# Patient Record
Sex: FEMALE | Race: WHITE | NOT HISPANIC OR LATINO | Employment: UNEMPLOYED
[De-identification: names, ages, dates, MRNs, and addresses within clinical notes are randomized per-mention and may not be internally consistent; named-entity substitution may affect disease eponyms.]

---

## 2017-08-12 ENCOUNTER — HEALTH MAINTENANCE LETTER (OUTPATIENT)
Age: 52
End: 2017-08-12

## 2019-11-06 ENCOUNTER — HEALTH MAINTENANCE LETTER (OUTPATIENT)
Age: 54
End: 2019-11-06

## 2020-02-16 ENCOUNTER — HEALTH MAINTENANCE LETTER (OUTPATIENT)
Age: 55
End: 2020-02-16

## 2020-11-29 ENCOUNTER — HEALTH MAINTENANCE LETTER (OUTPATIENT)
Age: 55
End: 2020-11-29

## 2021-04-10 ENCOUNTER — HEALTH MAINTENANCE LETTER (OUTPATIENT)
Age: 56
End: 2021-04-10

## 2021-09-19 ENCOUNTER — HEALTH MAINTENANCE LETTER (OUTPATIENT)
Age: 56
End: 2021-09-19

## 2021-11-14 ENCOUNTER — HEALTH MAINTENANCE LETTER (OUTPATIENT)
Age: 56
End: 2021-11-14

## 2022-05-01 ENCOUNTER — HEALTH MAINTENANCE LETTER (OUTPATIENT)
Age: 57
End: 2022-05-01

## 2022-11-21 ENCOUNTER — HEALTH MAINTENANCE LETTER (OUTPATIENT)
Age: 57
End: 2022-11-21

## 2023-05-22 ENCOUNTER — APPOINTMENT (OUTPATIENT)
Dept: GENERAL RADIOLOGY | Facility: CLINIC | Age: 58
End: 2023-05-22
Attending: STUDENT IN AN ORGANIZED HEALTH CARE EDUCATION/TRAINING PROGRAM

## 2023-05-22 ENCOUNTER — HOSPITAL ENCOUNTER (EMERGENCY)
Facility: CLINIC | Age: 58
Discharge: HOME | End: 2023-05-22
Attending: STUDENT IN AN ORGANIZED HEALTH CARE EDUCATION/TRAINING PROGRAM | Admitting: STUDENT IN AN ORGANIZED HEALTH CARE EDUCATION/TRAINING PROGRAM

## 2023-05-22 ENCOUNTER — APPOINTMENT (OUTPATIENT)
Dept: CT IMAGING | Facility: CLINIC | Age: 58
End: 2023-05-22
Attending: STUDENT IN AN ORGANIZED HEALTH CARE EDUCATION/TRAINING PROGRAM

## 2023-05-22 VITALS
SYSTOLIC BLOOD PRESSURE: 180 MMHG | HEART RATE: 77 BPM | OXYGEN SATURATION: 100 % | WEIGHT: 220 LBS | HEIGHT: 65 IN | RESPIRATION RATE: 20 BRPM | DIASTOLIC BLOOD PRESSURE: 114 MMHG | TEMPERATURE: 97.7 F | BODY MASS INDEX: 36.65 KG/M2

## 2023-05-22 DIAGNOSIS — M25.561 ACUTE PAIN OF RIGHT KNEE: ICD-10-CM

## 2023-05-22 DIAGNOSIS — S09.90XA CLOSED HEAD INJURY, INITIAL ENCOUNTER: ICD-10-CM
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
## 2023-05-22 PROCEDURE — 99284 EMERGENCY DEPT VISIT MOD MDM: CPT | Mod: 25

## 2023-05-22 PROCEDURE — G1010 CDSM STANSON: HCPCS

## 2023-05-22 PROCEDURE — 250N000013 HC RX MED GY IP 250 OP 250 PS 637: Performed by: STUDENT IN AN ORGANIZED HEALTH CARE EDUCATION/TRAINING PROGRAM

## 2023-05-22 PROCEDURE — 73562 X-RAY EXAM OF KNEE 3: CPT | Mod: RT

## 2023-05-22 RX ORDER — IBUPROFEN 600 MG/1
600 TABLET, FILM COATED ORAL ONCE
Status: COMPLETED | OUTPATIENT
Start: 2023-05-22 | End: 2023-05-22

## 2023-05-22 RX ADMIN — IBUPROFEN 600 MG: 600 TABLET ORAL at 12:47

## 2023-05-22 ASSESSMENT — ACTIVITIES OF DAILY LIVING (ADL): ADLS_ACUITY_SCORE: 35
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
## 2023-06-02 ENCOUNTER — HEALTH MAINTENANCE LETTER (OUTPATIENT)
Age: 58
End: 2023-06-02

## 2023-07-30 ENCOUNTER — APPOINTMENT (OUTPATIENT)
Dept: GENERAL RADIOLOGY | Facility: CLINIC | Age: 58
End: 2023-07-30
Attending: EMERGENCY MEDICINE
Payer: COMMERCIAL

## 2023-07-30 ENCOUNTER — HOSPITAL ENCOUNTER (EMERGENCY)
Facility: CLINIC | Age: 58
Discharge: HOME | End: 2023-07-30
Attending: EMERGENCY MEDICINE | Admitting: EMERGENCY MEDICINE
Payer: COMMERCIAL

## 2023-07-30 ENCOUNTER — APPOINTMENT (OUTPATIENT)
Dept: CT IMAGING | Facility: CLINIC | Age: 58
End: 2023-07-30
Attending: EMERGENCY MEDICINE
Payer: COMMERCIAL

## 2023-07-30 VITALS
SYSTOLIC BLOOD PRESSURE: 175 MMHG | DIASTOLIC BLOOD PRESSURE: 117 MMHG | HEART RATE: 59 BPM | RESPIRATION RATE: 18 BRPM | OXYGEN SATURATION: 100 % | TEMPERATURE: 98 F

## 2023-07-30 DIAGNOSIS — M16.10 HIP ARTHRITIS: ICD-10-CM

## 2023-07-30 DIAGNOSIS — M25.452 HIP JOINT EFFUSION, LEFT: ICD-10-CM

## 2023-07-30 PROCEDURE — 99284 EMERGENCY DEPT VISIT MOD MDM: CPT | Mod: 25

## 2023-07-30 PROCEDURE — 73502 X-RAY EXAM HIP UNI 2-3 VIEWS: CPT

## 2023-07-30 PROCEDURE — 72192 CT PELVIS W/O DYE: CPT

## 2023-07-30 RX ORDER — PREDNISONE 20 MG/1
TABLET ORAL
Qty: 10 TABLET | Refills: 0 | Status: SHIPPED | OUTPATIENT
Start: 2023-07-30

## 2023-07-30 RX ORDER — OXYCODONE HYDROCHLORIDE 5 MG/1
5 TABLET ORAL EVERY 6 HOURS PRN
Qty: 12 TABLET | Refills: 0 | Status: SHIPPED | OUTPATIENT
Start: 2023-07-30

## 2023-07-30 ASSESSMENT — ACTIVITIES OF DAILY LIVING (ADL): ADLS_ACUITY_SCORE: 35
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
## 2023-07-30 NOTE — DISCHARGE INSTRUCTIONS
Take the prednisone and oxycodone as needed for pain.  Follow-up with orthopedic surgery for consultation.

## 2023-10-08 ENCOUNTER — HOSPITAL ENCOUNTER (EMERGENCY)
Facility: CLINIC | Age: 58
Discharge: HOME | End: 2023-10-08
Attending: EMERGENCY MEDICINE | Admitting: EMERGENCY MEDICINE
Payer: COMMERCIAL

## 2023-10-08 VITALS
RESPIRATION RATE: 22 BRPM | BODY MASS INDEX: 37.56 KG/M2 | HEIGHT: 64 IN | TEMPERATURE: 98.3 F | WEIGHT: 220 LBS | HEART RATE: 91 BPM | OXYGEN SATURATION: 97 % | SYSTOLIC BLOOD PRESSURE: 138 MMHG | DIASTOLIC BLOOD PRESSURE: 78 MMHG

## 2023-10-08 DIAGNOSIS — L03.213 PERIORBITAL CELLULITIS OF LEFT EYE: ICD-10-CM

## 2023-10-08 DIAGNOSIS — H10.32 ACUTE BACTERIAL CONJUNCTIVITIS OF LEFT EYE: ICD-10-CM

## 2023-10-08 PROCEDURE — 99284 EMERGENCY DEPT VISIT MOD MDM: CPT

## 2023-10-08 RX ORDER — POLYMYXIN B SULFATE AND TRIMETHOPRIM 1; 10000 MG/ML; [USP'U]/ML
1-2 SOLUTION OPHTHALMIC EVERY 4 HOURS
Qty: 10 ML | Refills: 0 | Status: SHIPPED | OUTPATIENT
Start: 2023-10-08 | End: 2023-10-18

## 2023-10-08 RX ORDER — CLINDAMYCIN HCL 300 MG
300 CAPSULE ORAL 3 TIMES DAILY
Qty: 30 CAPSULE | Refills: 0 | Status: SHIPPED | OUTPATIENT
Start: 2023-10-08 | End: 2023-10-18

## 2023-10-08 ASSESSMENT — VISUAL ACUITY
OS: 20/60
OD: 20/20

## 2023-10-08 NOTE — ED TRIAGE NOTES
Left eye swollen, woke up in the middle of the night with it. Pt states puss has been coming out. States no injury to eye
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
## 2023-11-26 ENCOUNTER — HEALTH MAINTENANCE LETTER (OUTPATIENT)
Age: 58
End: 2023-11-26

## 2024-02-01 ENCOUNTER — OFFICE VISIT (OUTPATIENT)
Dept: URGENT CARE | Facility: URGENT CARE | Age: 59
End: 2024-02-01
Payer: COMMERCIAL

## 2024-02-01 VITALS
OXYGEN SATURATION: 99 % | TEMPERATURE: 97.8 F | HEART RATE: 68 BPM | DIASTOLIC BLOOD PRESSURE: 87 MMHG | SYSTOLIC BLOOD PRESSURE: 135 MMHG | BODY MASS INDEX: 39.48 KG/M2 | WEIGHT: 230 LBS

## 2024-02-01 DIAGNOSIS — B96.89 BACTERIAL VAGINOSIS: ICD-10-CM

## 2024-02-01 DIAGNOSIS — H10.11 ALLERGIC CONJUNCTIVITIS, RIGHT: ICD-10-CM

## 2024-02-01 DIAGNOSIS — N76.0 BACTERIAL VAGINOSIS: ICD-10-CM

## 2024-02-01 DIAGNOSIS — N39.0 ACUTE UTI: Primary | ICD-10-CM

## 2024-02-01 DIAGNOSIS — R10.13 ABDOMINAL PAIN, EPIGASTRIC: ICD-10-CM

## 2024-02-01 LAB
ALBUMIN UR-MCNC: NEGATIVE MG/DL
APPEARANCE UR: CLEAR
BACTERIA #/AREA URNS HPF: ABNORMAL /HPF
BILIRUB UR QL STRIP: NEGATIVE
CLUE CELLS: PRESENT
COLOR UR AUTO: YELLOW
GLUCOSE UR STRIP-MCNC: NEGATIVE MG/DL
HGB UR QL STRIP: ABNORMAL
KETONES UR STRIP-MCNC: NEGATIVE MG/DL
LEUKOCYTE ESTERASE UR QL STRIP: NEGATIVE
NITRATE UR QL: POSITIVE
PH UR STRIP: 5.5 [PH] (ref 5–7)
RBC #/AREA URNS AUTO: ABNORMAL /HPF
SP GR UR STRIP: >=1.03 (ref 1–1.03)
SQUAMOUS #/AREA URNS AUTO: ABNORMAL /LPF
TRICHOMONAS, WET PREP: ABNORMAL
UROBILINOGEN UR STRIP-ACNC: 0.2 E.U./DL
WBC #/AREA URNS AUTO: ABNORMAL /HPF
WBC'S/HIGH POWER FIELD, WET PREP: ABNORMAL
YEAST, WET PREP: ABNORMAL

## 2024-02-01 PROCEDURE — 81001 URINALYSIS AUTO W/SCOPE: CPT | Performed by: EMERGENCY MEDICINE

## 2024-02-01 PROCEDURE — 99204 OFFICE O/P NEW MOD 45 MIN: CPT | Performed by: EMERGENCY MEDICINE

## 2024-02-01 PROCEDURE — 87210 SMEAR WET MOUNT SALINE/INK: CPT | Performed by: NURSE PRACTITIONER

## 2024-02-01 PROCEDURE — 87086 URINE CULTURE/COLONY COUNT: CPT | Performed by: EMERGENCY MEDICINE

## 2024-02-01 RX ORDER — AMLODIPINE BESYLATE 10 MG/1
10 TABLET ORAL DAILY
COMMUNITY

## 2024-02-01 RX ORDER — METRONIDAZOLE 500 MG/1
500 TABLET ORAL 2 TIMES DAILY
Qty: 14 TABLET | Refills: 0 | Status: SHIPPED | OUTPATIENT
Start: 2024-02-01 | End: 2024-02-08

## 2024-02-01 RX ORDER — NITROFURANTOIN 25; 75 MG/1; MG/1
100 CAPSULE ORAL 2 TIMES DAILY
Qty: 10 CAPSULE | Refills: 0 | Status: SHIPPED | OUTPATIENT
Start: 2024-02-01 | End: 2024-02-06

## 2024-02-01 RX ORDER — OLOPATADINE HYDROCHLORIDE 1 MG/ML
1 SOLUTION/ DROPS OPHTHALMIC 2 TIMES DAILY
Qty: 5 ML | Refills: 0 | Status: SHIPPED | OUTPATIENT
Start: 2024-02-01

## 2024-02-01 NOTE — PROGRESS NOTES
Assessment & Plan     Diagnosis:    ICD-10-CM    1. Acute UTI  N39.0 UA Macroscopic with reflex to Microscopic and Culture - Clinic Collect     Wet prep - Clinic Collect     Urine Microscopic Exam     Urine Culture     nitroFURantoin macrocrystal-monohydrate (MACROBID) 100 MG capsule      2. Bacterial vaginosis  N76.0 metroNIDAZOLE (FLAGYL) 500 MG tablet    B96.89       3. Allergic conjunctivitis, right  H10.11 olopatadine (PATANOL) 0.1 % ophthalmic solution      4. Abdominal pain, epigastric  R10.13 lidocaine (viscous) (XYLOCAINE) 2 % 15 mL, alum & mag hydroxide-simethicone (MAALOX) 15 mL GI Cocktail          Medical Decision Making:  Omayra Malone is a 58 year old female who presents to clinic today for evaluation of urinary frequency, urgency and dysuria.     This clinically is consistent with a urinary tract infection.  Urinalysis confirms the infection.  The patient is not pregnant.No concern for pregnancy; patient declines testing. There has been no fever, confusion, flank pain or significant abdominal pain.  The patient is not concerned about STDs and testing not indicated. There is no clinical evidence of pyelonephritis, appendicitis, colitis, diverticulitis or any intraabdominal catastrophe.  She does have epigastric abdominal pain as well, I feel this is unrelated, likely gastritis.  She did have improvement with GI cocktail.  The patient will be started on antibiotics for BV and the urinary tract infection.     Moreover, she has signs and symptoms consistent with allergic conjunctivitis, itchy watery eye, there is been no mattering or signs of bacterial conjunctivitis.  Will start her on Pataday eyedrops.  Go to the ER if increasing pain, vomiting, fever, or inability to tolerate the oral antibiotic.  Follow up with primary physician is indicated if not improving in 2-3 days.     Fly Guo PA-C  [facility]    Subjective     Omayra Malone is a 58 year old female who presents with   to clinic today for the following health issues:  Chief Complaint   Patient presents with    Abdominal Pain     For a few day and cloudy urine     Eye Problem     Right eye itchy and watery yesterday          HPI  Patient states that for the past few days they experienced a burning sensation, and frequency and urgency of urination. This has gotten worse over time. They note that they have mild epigastric abdominal pain as well. Patient denies any flank or back pain, fever, nausea (did have one episode of vomiting yesterday). No diarrhea, inability to urinate, vaginal discharge/bleeding or concerns for STDS. She reports she just got out of skilled nursing.    Moreover, patient notes she has had itchy watery eye for the last 24 hours, no crust, mattering, yellow goop or anything by clear drainage.  Has had a runny stuffy nose as well.  No other URI symptoms.  No vision changes, foreign body sensation or other concerns.    Review of Systems    See HPI    Objective      Vitals:    Patient Vitals for the past 24 hrs:   BP Temp Temp src Pulse SpO2 Weight   02/01/24 1300 135/87 97.8  F (36.6  C) Tympanic 68 99 % 104.3 kg (230 lb)         Vital signs reviewed by: Fly Guo PA-C    Physical Exam   Constitutional: The patient is oriented to person, place, and time. Alert and cooperative. No acute distress.  Mouth: Mucous membranes are moist.  Cardiovascular: Regular rate and rhythm.  Pulmonary/Chest: Effort normal. No respiratory distress.   GI: Abdomen with epigastric tenderness to palpation. Negative Wilkes's sign. No rebound or guarding. No McBurney point tenderness.   MSK: No CVA tenderness bilaterally.  Neurological: Alert and oriented x3.     Labs/Imaging:    Results for orders placed or performed in visit on 02/01/24   UA Macroscopic with reflex to Microscopic and Culture - Clinic Collect     Status: Abnormal    Specimen: Urine, Clean Catch   Result Value Ref Range    Color Urine Yellow Colorless, Straw, Light Yellow,  Yellow    Appearance Urine Clear Clear    Glucose Urine Negative Negative mg/dL    Bilirubin Urine Negative Negative    Ketones Urine Negative Negative mg/dL    Specific Gravity Urine >=1.030 1.003 - 1.035    Blood Urine Small (A) Negative    pH Urine 5.5 5.0 - 7.0    Protein Albumin Urine Negative Negative mg/dL    Urobilinogen Urine 0.2 0.2, 1.0 E.U./dL    Nitrite Urine Positive (A) Negative    Leukocyte Esterase Urine Negative Negative   Urine Microscopic Exam     Status: Abnormal   Result Value Ref Range    Bacteria Urine Many (A) None Seen /HPF    RBC Urine 0-2 0-2 /HPF /HPF    WBC Urine 5-10 (A) 0-5 /HPF /HPF    Squamous Epithelials Urine Few (A) None Seen /LPF   Wet prep - Clinic Collect     Status: Abnormal    Specimen: Vagina; Swab   Result Value Ref Range    Trichomonas Absent Absent    Yeast Absent Absent    Clue Cells Present (A) Absent    WBCs/high power field 2+ (A) None       Interventions:  GI Cocktail (Maalox/Mylanta and viscous Lidocaine), 30 mL suspension, PO       Fly Guo PA-C, February 1, 2024

## 2024-02-03 LAB — BACTERIA UR CULT: NORMAL

## 2024-05-15 ENCOUNTER — APPOINTMENT (OUTPATIENT)
Dept: GENERAL RADIOLOGY | Facility: CLINIC | Age: 59
End: 2024-05-15
Attending: EMERGENCY MEDICINE
Payer: COMMERCIAL

## 2024-05-15 ENCOUNTER — HOSPITAL ENCOUNTER (EMERGENCY)
Facility: CLINIC | Age: 59
Discharge: LEFT BEFORE DISCHARGE | End: 2024-05-15
Admitting: EMERGENCY MEDICINE
Payer: COMMERCIAL

## 2024-05-15 VITALS
TEMPERATURE: 97.2 F | DIASTOLIC BLOOD PRESSURE: 82 MMHG | OXYGEN SATURATION: 94 % | SYSTOLIC BLOOD PRESSURE: 160 MMHG | RESPIRATION RATE: 22 BRPM | HEART RATE: 73 BPM

## 2024-05-15 LAB
ALBUMIN SERPL BCG-MCNC: 4 G/DL (ref 3.5–5.2)
ALP SERPL-CCNC: 98 U/L (ref 40–150)
ALT SERPL W P-5'-P-CCNC: 28 U/L (ref 0–50)
ANION GAP SERPL CALCULATED.3IONS-SCNC: 13 MMOL/L (ref 7–15)
AST SERPL W P-5'-P-CCNC: 18 U/L (ref 0–45)
ATRIAL RATE - MUSE: 64 BPM
BASOPHILS # BLD AUTO: 0 10E3/UL (ref 0–0.2)
BASOPHILS NFR BLD AUTO: 0 %
BILIRUB SERPL-MCNC: 0.2 MG/DL
BUN SERPL-MCNC: 15.5 MG/DL (ref 8–23)
CALCIUM SERPL-MCNC: 9.3 MG/DL (ref 8.8–10.2)
CHLORIDE SERPL-SCNC: 108 MMOL/L (ref 98–107)
CREAT SERPL-MCNC: 0.57 MG/DL (ref 0.51–0.95)
DEPRECATED HCO3 PLAS-SCNC: 26 MMOL/L (ref 22–29)
DIASTOLIC BLOOD PRESSURE - MUSE: NORMAL MMHG
EGFRCR SERPLBLD CKD-EPI 2021: >90 ML/MIN/1.73M2
EOSINOPHIL # BLD AUTO: 0.1 10E3/UL (ref 0–0.7)
EOSINOPHIL NFR BLD AUTO: 2 %
ERYTHROCYTE [DISTWIDTH] IN BLOOD BY AUTOMATED COUNT: 14.6 % (ref 10–15)
FLUAV RNA SPEC QL NAA+PROBE: NEGATIVE
FLUBV RNA RESP QL NAA+PROBE: NEGATIVE
GLUCOSE SERPL-MCNC: 100 MG/DL (ref 70–99)
HCT VFR BLD AUTO: 38.1 % (ref 35–47)
HGB BLD-MCNC: 11.8 G/DL (ref 11.7–15.7)
IMM GRANULOCYTES # BLD: 0 10E3/UL
IMM GRANULOCYTES NFR BLD: 0 %
INTERPRETATION ECG - MUSE: NORMAL
LYMPHOCYTES # BLD AUTO: 2.4 10E3/UL (ref 0.8–5.3)
LYMPHOCYTES NFR BLD AUTO: 35 %
MCH RBC QN AUTO: 27.6 PG (ref 26.5–33)
MCHC RBC AUTO-ENTMCNC: 31 G/DL (ref 31.5–36.5)
MCV RBC AUTO: 89 FL (ref 78–100)
MONOCYTES # BLD AUTO: 0.5 10E3/UL (ref 0–1.3)
MONOCYTES NFR BLD AUTO: 7 %
NEUTROPHILS # BLD AUTO: 3.7 10E3/UL (ref 1.6–8.3)
NEUTROPHILS NFR BLD AUTO: 56 %
NRBC # BLD AUTO: 0 10E3/UL
NRBC BLD AUTO-RTO: 0 /100
P AXIS - MUSE: 61 DEGREES
PLATELET # BLD AUTO: 234 10E3/UL (ref 150–450)
POTASSIUM SERPL-SCNC: 3.4 MMOL/L (ref 3.4–5.3)
PR INTERVAL - MUSE: 162 MS
PROT SERPL-MCNC: 7.2 G/DL (ref 6.4–8.3)
QRS DURATION - MUSE: 102 MS
QT - MUSE: 426 MS
QTC - MUSE: 439 MS
R AXIS - MUSE: -60 DEGREES
RBC # BLD AUTO: 4.27 10E6/UL (ref 3.8–5.2)
RSV RNA SPEC NAA+PROBE: NEGATIVE
SARS-COV-2 RNA RESP QL NAA+PROBE: NEGATIVE
SODIUM SERPL-SCNC: 147 MMOL/L (ref 135–145)
SYSTOLIC BLOOD PRESSURE - MUSE: NORMAL MMHG
T AXIS - MUSE: 51 DEGREES
TROPONIN T SERPL HS-MCNC: 7 NG/L
VENTRICULAR RATE- MUSE: 64 BPM
WBC # BLD AUTO: 6.7 10E3/UL (ref 4–11)

## 2024-05-15 PROCEDURE — 99281 EMR DPT VST MAYX REQ PHY/QHP: CPT

## 2024-05-15 PROCEDURE — 84484 ASSAY OF TROPONIN QUANT: CPT | Performed by: EMERGENCY MEDICINE

## 2024-05-15 PROCEDURE — 80053 COMPREHEN METABOLIC PANEL: CPT | Performed by: EMERGENCY MEDICINE

## 2024-05-15 PROCEDURE — 36415 COLL VENOUS BLD VENIPUNCTURE: CPT | Performed by: EMERGENCY MEDICINE

## 2024-05-15 PROCEDURE — 85004 AUTOMATED DIFF WBC COUNT: CPT | Performed by: EMERGENCY MEDICINE

## 2024-05-15 PROCEDURE — 87637 SARSCOV2&INF A&B&RSV AMP PRB: CPT | Performed by: EMERGENCY MEDICINE

## 2024-05-15 PROCEDURE — 93005 ELECTROCARDIOGRAM TRACING: CPT | Mod: RTG

## 2024-05-15 PROCEDURE — 71046 X-RAY EXAM CHEST 2 VIEWS: CPT

## 2024-05-15 ASSESSMENT — COLUMBIA-SUICIDE SEVERITY RATING SCALE - C-SSRS
6. HAVE YOU EVER DONE ANYTHING, STARTED TO DO ANYTHING, OR PREPARED TO DO ANYTHING TO END YOUR LIFE?: NO
1. IN THE PAST MONTH, HAVE YOU WISHED YOU WERE DEAD OR WISHED YOU COULD GO TO SLEEP AND NOT WAKE UP?: NO
2. HAVE YOU ACTUALLY HAD ANY THOUGHTS OF KILLING YOURSELF IN THE PAST MONTH?: NO

## 2024-05-15 ASSESSMENT — ACTIVITIES OF DAILY LIVING (ADL)
ADLS_ACUITY_SCORE: 35
ADLS_ACUITY_SCORE: 35

## 2024-05-16 NOTE — ED TRIAGE NOTES
Patient comes in with worsening cough for a week. State she is coughing up mucus. States slights chest pain from cough. Denies fevers. From .      Triage Assessment (Adult)       Row Name 05/15/24 0657          Triage Assessment    Airway WDL WDL        Respiratory WDL    Respiratory WDL --  cough and sob        Skin Circulation/Temperature WDL    Skin Circulation/Temperature WDL WDL        Cardiac WDL    Cardiac WDL --  chest pain        Peripheral/Neurovascular WDL    Peripheral Neurovascular WDL WDL        Cognitive/Neuro/Behavioral WDL    Cognitive/Neuro/Behavioral WDL WDL

## 2024-06-23 ENCOUNTER — HEALTH MAINTENANCE LETTER (OUTPATIENT)
Age: 59
End: 2024-06-23

## 2025-05-22 ENCOUNTER — MEDICAL CORRESPONDENCE (OUTPATIENT)
Dept: HEALTH INFORMATION MANAGEMENT | Facility: CLINIC | Age: 60
End: 2025-05-22

## 2025-05-22 ENCOUNTER — HOSPITAL ENCOUNTER (INPATIENT)
Facility: CLINIC | Age: 60
DRG: 291 | End: 2025-05-22
Attending: EMERGENCY MEDICINE
Payer: COMMERCIAL

## 2025-05-22 ENCOUNTER — APPOINTMENT (OUTPATIENT)
Dept: GENERAL RADIOLOGY | Facility: CLINIC | Age: 60
DRG: 291 | End: 2025-05-22
Attending: EMERGENCY MEDICINE
Payer: COMMERCIAL

## 2025-05-22 VITALS
DIASTOLIC BLOOD PRESSURE: 98 MMHG | HEIGHT: 64 IN | RESPIRATION RATE: 21 BRPM | SYSTOLIC BLOOD PRESSURE: 142 MMHG | OXYGEN SATURATION: 98 % | WEIGHT: 293 LBS | HEART RATE: 71 BPM | BODY MASS INDEX: 50.02 KG/M2 | TEMPERATURE: 98.6 F

## 2025-05-22 DIAGNOSIS — I48.92 ATRIAL FLUTTER WITH RAPID VENTRICULAR RESPONSE (H): ICD-10-CM

## 2025-05-22 LAB
ALBUMIN SERPL BCG-MCNC: 3.7 G/DL (ref 3.5–5.2)
ALP SERPL-CCNC: 124 U/L (ref 40–150)
ALT SERPL W P-5'-P-CCNC: 12 U/L (ref 0–50)
ANION GAP SERPL CALCULATED.3IONS-SCNC: 12 MMOL/L (ref 7–15)
AST SERPL W P-5'-P-CCNC: 15 U/L (ref 0–45)
ATRIAL RATE - MUSE: 366 BPM
ATRIAL RATE - MUSE: 81 BPM
BASOPHILS # BLD AUTO: 0 10E3/UL (ref 0–0.2)
BASOPHILS NFR BLD AUTO: 0 %
BILIRUB DIRECT SERPL-MCNC: 0.09 MG/DL (ref 0–0.3)
BILIRUB SERPL-MCNC: 0.2 MG/DL
BUN SERPL-MCNC: 16.5 MG/DL (ref 8–23)
CALCIUM SERPL-MCNC: 9.2 MG/DL (ref 8.8–10.4)
CHLORIDE SERPL-SCNC: 106 MMOL/L (ref 98–107)
CREAT SERPL-MCNC: 0.62 MG/DL (ref 0.51–0.95)
DIASTOLIC BLOOD PRESSURE - MUSE: NORMAL MMHG
DIASTOLIC BLOOD PRESSURE - MUSE: NORMAL MMHG
EGFRCR SERPLBLD CKD-EPI 2021: >90 ML/MIN/1.73M2
EOSINOPHIL # BLD AUTO: 0.1 10E3/UL (ref 0–0.7)
EOSINOPHIL NFR BLD AUTO: 1 %
ERYTHROCYTE [DISTWIDTH] IN BLOOD BY AUTOMATED COUNT: 21.2 % (ref 10–15)
GLUCOSE SERPL-MCNC: 110 MG/DL (ref 70–99)
HCO3 SERPL-SCNC: 23 MMOL/L (ref 22–29)
HCT VFR BLD AUTO: 26.5 % (ref 35–47)
HGB BLD-MCNC: 8 G/DL (ref 11.7–15.7)
HOLD SPECIMEN: NORMAL
IMM GRANULOCYTES # BLD: 0 10E3/UL
IMM GRANULOCYTES NFR BLD: 0 %
INTERPRETATION ECG - MUSE: NORMAL
INTERPRETATION ECG - MUSE: NORMAL
IRON BINDING CAPACITY (ROCHE): 416 UG/DL (ref 240–430)
IRON SATN MFR SERPL: 4 % (ref 15–46)
IRON SERPL-MCNC: 16 UG/DL (ref 37–145)
LYMPHOCYTES # BLD AUTO: 1.8 10E3/UL (ref 0.8–5.3)
LYMPHOCYTES NFR BLD AUTO: 18 %
MAGNESIUM SERPL-MCNC: 1.7 MG/DL (ref 1.7–2.3)
MCH RBC QN AUTO: 21.7 PG (ref 26.5–33)
MCHC RBC AUTO-ENTMCNC: 30.2 G/DL (ref 31.5–36.5)
MCV RBC AUTO: 72 FL (ref 78–100)
MONOCYTES # BLD AUTO: 0.8 10E3/UL (ref 0–1.3)
MONOCYTES NFR BLD AUTO: 8 %
NEUTROPHILS # BLD AUTO: 6.9 10E3/UL (ref 1.6–8.3)
NEUTROPHILS NFR BLD AUTO: 72 %
NRBC # BLD AUTO: 0 10E3/UL
NRBC BLD AUTO-RTO: 0 /100
NT-PROBNP SERPL-MCNC: 1648 PG/ML (ref 0–226)
P AXIS - MUSE: 23 DEGREES
P AXIS - MUSE: 46 DEGREES
PLATELET # BLD AUTO: 275 10E3/UL (ref 150–450)
POTASSIUM SERPL-SCNC: 3.9 MMOL/L (ref 3.4–5.3)
PR INTERVAL - MUSE: 160 MS
PR INTERVAL - MUSE: NORMAL MS
PROT SERPL-MCNC: 6.8 G/DL (ref 6.4–8.3)
QRS DURATION - MUSE: 84 MS
QRS DURATION - MUSE: 86 MS
QT - MUSE: 366 MS
QT - MUSE: 392 MS
QTC - MUSE: 455 MS
QTC - MUSE: 474 MS
R AXIS - MUSE: -29 DEGREES
R AXIS - MUSE: -43 DEGREES
RBC # BLD AUTO: 3.68 10E6/UL (ref 3.8–5.2)
SODIUM SERPL-SCNC: 141 MMOL/L (ref 135–145)
SYSTOLIC BLOOD PRESSURE - MUSE: NORMAL MMHG
SYSTOLIC BLOOD PRESSURE - MUSE: NORMAL MMHG
T AXIS - MUSE: 7 DEGREES
T AXIS - MUSE: 95 DEGREES
TROPONIN T SERPL HS-MCNC: 11 NG/L
TSH SERPL DL<=0.005 MIU/L-ACNC: 2.06 UIU/ML (ref 0.3–4.2)
VENTRICULAR RATE- MUSE: 101 BPM
VENTRICULAR RATE- MUSE: 81 BPM
WBC # BLD AUTO: 9.6 10E3/UL (ref 4–11)

## 2025-05-22 PROCEDURE — 210N000001 HC R&B IMCU HEART CARE

## 2025-05-22 PROCEDURE — 93005 ELECTROCARDIOGRAM TRACING: CPT | Mod: XU

## 2025-05-22 PROCEDURE — 83550 IRON BINDING TEST: CPT

## 2025-05-22 PROCEDURE — 71045 X-RAY EXAM CHEST 1 VIEW: CPT

## 2025-05-22 PROCEDURE — 999N000157 HC STATISTIC RCP TIME EA 10 MIN

## 2025-05-22 PROCEDURE — 83880 ASSAY OF NATRIURETIC PEPTIDE: CPT | Performed by: EMERGENCY MEDICINE

## 2025-05-22 PROCEDURE — 99223 1ST HOSP IP/OBS HIGH 75: CPT

## 2025-05-22 PROCEDURE — 80076 HEPATIC FUNCTION PANEL: CPT

## 2025-05-22 PROCEDURE — 85025 COMPLETE CBC W/AUTO DIFF WBC: CPT | Performed by: EMERGENCY MEDICINE

## 2025-05-22 PROCEDURE — 84443 ASSAY THYROID STIM HORMONE: CPT | Performed by: EMERGENCY MEDICINE

## 2025-05-22 PROCEDURE — 99291 CRITICAL CARE FIRST HOUR: CPT | Mod: 25

## 2025-05-22 PROCEDURE — 250N000009 HC RX 250: Performed by: EMERGENCY MEDICINE

## 2025-05-22 PROCEDURE — 84132 ASSAY OF SERUM POTASSIUM: CPT | Performed by: EMERGENCY MEDICINE

## 2025-05-22 PROCEDURE — 258N000003 HC RX IP 258 OP 636: Performed by: EMERGENCY MEDICINE

## 2025-05-22 PROCEDURE — 82728 ASSAY OF FERRITIN: CPT

## 2025-05-22 PROCEDURE — 96374 THER/PROPH/DIAG INJ IV PUSH: CPT | Mod: 59

## 2025-05-22 PROCEDURE — 250N000009 HC RX 250: Performed by: STUDENT IN AN ORGANIZED HEALTH CARE EDUCATION/TRAINING PROGRAM

## 2025-05-22 PROCEDURE — 250N000011 HC RX IP 250 OP 636: Performed by: EMERGENCY MEDICINE

## 2025-05-22 PROCEDURE — 93005 ELECTROCARDIOGRAM TRACING: CPT

## 2025-05-22 PROCEDURE — 36415 COLL VENOUS BLD VENIPUNCTURE: CPT | Performed by: EMERGENCY MEDICINE

## 2025-05-22 PROCEDURE — 83735 ASSAY OF MAGNESIUM: CPT

## 2025-05-22 PROCEDURE — 96365 THER/PROPH/DIAG IV INF INIT: CPT

## 2025-05-22 PROCEDURE — 82607 VITAMIN B-12: CPT

## 2025-05-22 PROCEDURE — 84484 ASSAY OF TROPONIN QUANT: CPT | Performed by: EMERGENCY MEDICINE

## 2025-05-22 RX ORDER — DILTIAZEM HYDROCHLORIDE 5 MG/ML
10 INJECTION INTRAVENOUS ONCE
Status: COMPLETED | OUTPATIENT
Start: 2025-05-22 | End: 2025-05-22

## 2025-05-22 RX ORDER — NYSTATIN 100000 [USP'U]/G
POWDER TOPICAL DAILY PRN
COMMUNITY

## 2025-05-22 RX ORDER — HYDROCHLOROTHIAZIDE 25 MG/1
25 TABLET ORAL DAILY
Status: ON HOLD | COMMUNITY
End: 2025-06-02

## 2025-05-22 RX ORDER — ALPRAZOLAM 2 MG/1
2 TABLET ORAL 2 TIMES DAILY PRN
COMMUNITY
End: 2025-05-22

## 2025-05-22 RX ORDER — LIDOCAINE 40 MG/G
CREAM TOPICAL
Status: DISCONTINUED | OUTPATIENT
Start: 2025-05-22 | End: 2025-06-02 | Stop reason: HOSPADM

## 2025-05-22 RX ORDER — ALPRAZOLAM 0.25 MG
1 TABLET ORAL DAILY PRN
Status: DISCONTINUED | OUTPATIENT
Start: 2025-05-22 | End: 2025-05-24

## 2025-05-22 RX ORDER — NALOXONE HYDROCHLORIDE 0.4 MG/ML
0.2 INJECTION, SOLUTION INTRAMUSCULAR; INTRAVENOUS; SUBCUTANEOUS
Status: DISCONTINUED | OUTPATIENT
Start: 2025-05-22 | End: 2025-06-02 | Stop reason: HOSPADM

## 2025-05-22 RX ORDER — CALCIUM CARBONATE 500 MG/1
1000 TABLET, CHEWABLE ORAL 4 TIMES DAILY PRN
Status: DISCONTINUED | OUTPATIENT
Start: 2025-05-22 | End: 2025-06-02 | Stop reason: HOSPADM

## 2025-05-22 RX ORDER — DILTIAZEM HYDROCHLORIDE 5 MG/ML
INJECTION INTRAVENOUS
Status: DISCONTINUED
Start: 2025-05-22 | End: 2025-05-22 | Stop reason: HOSPADM

## 2025-05-22 RX ORDER — PROPOFOL 10 MG/ML
20 INJECTION, EMULSION INTRAVENOUS
Status: DISCONTINUED | OUTPATIENT
Start: 2025-05-22 | End: 2025-05-22

## 2025-05-22 RX ORDER — HEPARIN SODIUM 10000 [USP'U]/100ML
0-5000 INJECTION, SOLUTION INTRAVENOUS CONTINUOUS
Status: DISCONTINUED | OUTPATIENT
Start: 2025-05-22 | End: 2025-05-22

## 2025-05-22 RX ORDER — NALOXONE HYDROCHLORIDE 0.4 MG/ML
0.4 INJECTION, SOLUTION INTRAMUSCULAR; INTRAVENOUS; SUBCUTANEOUS
Status: DISCONTINUED | OUTPATIENT
Start: 2025-05-22 | End: 2025-06-02 | Stop reason: HOSPADM

## 2025-05-22 RX ORDER — ALPRAZOLAM 2 MG/1
2 TABLET ORAL 2 TIMES DAILY PRN
COMMUNITY

## 2025-05-22 RX ORDER — PROPOFOL 10 MG/ML
INJECTION, EMULSION INTRAVENOUS
Status: DISCONTINUED
Start: 2025-05-22 | End: 2025-05-22 | Stop reason: WASHOUT

## 2025-05-22 RX ORDER — LAMOTRIGINE 100 MG/1
100 TABLET ORAL DAILY
COMMUNITY

## 2025-05-22 RX ORDER — OXYCODONE HYDROCHLORIDE 5 MG/1
5 TABLET ORAL EVERY 8 HOURS
COMMUNITY

## 2025-05-22 RX ORDER — ACETAMINOPHEN 325 MG/1
975 TABLET ORAL EVERY 6 HOURS PRN
Status: DISCONTINUED | OUTPATIENT
Start: 2025-05-22 | End: 2025-06-02 | Stop reason: HOSPADM

## 2025-05-22 RX ORDER — HYDROCHLOROTHIAZIDE 25 MG/1
25 TABLET ORAL DAILY
Status: DISCONTINUED | OUTPATIENT
Start: 2025-05-23 | End: 2025-05-23

## 2025-05-22 RX ORDER — MAGNESIUM SULFATE HEPTAHYDRATE 40 MG/ML
2 INJECTION, SOLUTION INTRAVENOUS ONCE
Status: COMPLETED | OUTPATIENT
Start: 2025-05-23 | End: 2025-05-23

## 2025-05-22 RX ORDER — OXYCODONE HYDROCHLORIDE 5 MG/1
5 TABLET ORAL EVERY 8 HOURS PRN
Refills: 0 | Status: DISCONTINUED | OUTPATIENT
Start: 2025-05-22 | End: 2025-06-02 | Stop reason: HOSPADM

## 2025-05-22 RX ORDER — LAMOTRIGINE 100 MG/1
100 TABLET ORAL DAILY
Status: DISCONTINUED | OUTPATIENT
Start: 2025-05-23 | End: 2025-06-02 | Stop reason: HOSPADM

## 2025-05-22 RX ORDER — DILTIAZEM HYDROCHLORIDE 5 MG/ML
5 INJECTION INTRAVENOUS ONCE
Status: COMPLETED | OUTPATIENT
Start: 2025-05-22 | End: 2025-05-22

## 2025-05-22 RX ORDER — AMLODIPINE BESYLATE 5 MG/1
10 TABLET ORAL DAILY
Status: DISCONTINUED | OUTPATIENT
Start: 2025-05-23 | End: 2025-05-23

## 2025-05-22 RX ORDER — AMOXICILLIN 250 MG
2 CAPSULE ORAL 2 TIMES DAILY PRN
Status: DISCONTINUED | OUTPATIENT
Start: 2025-05-22 | End: 2025-06-02 | Stop reason: HOSPADM

## 2025-05-22 RX ORDER — HEPARIN SODIUM 10000 [USP'U]/100ML
0-5000 INJECTION, SOLUTION INTRAVENOUS CONTINUOUS
Status: DISCONTINUED | OUTPATIENT
Start: 2025-05-23 | End: 2025-05-23

## 2025-05-22 RX ORDER — ETOMIDATE 2 MG/ML
10 INJECTION INTRAVENOUS ONCE
Status: COMPLETED | OUTPATIENT
Start: 2025-05-22 | End: 2025-05-22

## 2025-05-22 RX ORDER — AMOXICILLIN 250 MG
1 CAPSULE ORAL 2 TIMES DAILY PRN
Status: DISCONTINUED | OUTPATIENT
Start: 2025-05-22 | End: 2025-06-02 | Stop reason: HOSPADM

## 2025-05-22 RX ORDER — POTASSIUM CHLORIDE 1500 MG/1
20 TABLET, EXTENDED RELEASE ORAL ONCE
Status: COMPLETED | OUTPATIENT
Start: 2025-05-23 | End: 2025-05-23

## 2025-05-22 RX ORDER — DOXYCYCLINE 100 MG/1
100 CAPSULE ORAL 2 TIMES DAILY
Status: DISCONTINUED | OUTPATIENT
Start: 2025-05-22 | End: 2025-06-02 | Stop reason: HOSPADM

## 2025-05-22 RX ORDER — DOXYCYCLINE HYCLATE 100 MG
100 TABLET ORAL 2 TIMES DAILY
COMMUNITY

## 2025-05-22 RX ADMIN — DILTIAZEM HYDROCHLORIDE 10 MG: 5 INJECTION INTRAVENOUS at 17:47

## 2025-05-22 RX ADMIN — DILTIAZEM HYDROCHLORIDE 15 MG/HR: 5 INJECTION INTRAVENOUS at 19:33

## 2025-05-22 RX ADMIN — ETOMIDATE INJECTION 10 MG: 2 SOLUTION INTRAVENOUS at 23:01

## 2025-05-22 RX ADMIN — DILTIAZEM HYDROCHLORIDE 5 MG: 5 INJECTION INTRAVENOUS at 18:09

## 2025-05-22 RX ADMIN — DILTIAZEM HYDROCHLORIDE 5 MG/HR: 5 INJECTION INTRAVENOUS at 18:33

## 2025-05-22 RX ADMIN — DILTIAZEM HYDROCHLORIDE 10 MG/HR: 5 INJECTION INTRAVENOUS at 19:03

## 2025-05-22 ASSESSMENT — ACTIVITIES OF DAILY LIVING (ADL)
ADLS_ACUITY_SCORE: 41

## 2025-05-22 ASSESSMENT — COLUMBIA-SUICIDE SEVERITY RATING SCALE - C-SSRS
1. IN THE PAST MONTH, HAVE YOU WISHED YOU WERE DEAD OR WISHED YOU COULD GO TO SLEEP AND NOT WAKE UP?: NO
6. HAVE YOU EVER DONE ANYTHING, STARTED TO DO ANYTHING, OR PREPARED TO DO ANYTHING TO END YOUR LIFE?: NO
2. HAVE YOU ACTUALLY HAD ANY THOUGHTS OF KILLING YOURSELF IN THE PAST MONTH?: NO

## 2025-05-22 NOTE — ED TRIAGE NOTES
BIBA for SVT.  HR 170s.  Attempting adenosine x2 without converting.  Pt states has had SOB x3 days.  Slight chest pressure.  94% on RA.  Comes from group home.      Triage Assessment (Adult)       Row Name 05/22/25 1745          Triage Assessment    Airway WDL WDL        Respiratory WDL    Respiratory WDL WDL        Skin Circulation/Temperature WDL    Skin Circulation/Temperature WDL WDL        Cardiac WDL    Cardiac WDL X        Peripheral/Neurovascular WDL    Peripheral Neurovascular WDL X        Cognitive/Neuro/Behavioral WDL    Cognitive/Neuro/Behavioral WDL WDL

## 2025-05-22 NOTE — ED NOTES
Patient's HR slowed to 120-140s, appeared to be an a fib/a flutter.  MD made aware.  Plan to start dilt drip.

## 2025-05-22 NOTE — ED PROVIDER NOTES
Emergency Department Note      History of Present Illness     Chief Complaint   Shortness of Breath      HPI   Omayra Malone is a 60 year old female with a history of hypertension amongst others presenting to the ED via EMS from her group home with rapid heart rate. EMs reports that the patient has suffered ongoing shortness of breath, palpitations for a few days.  She is not having current chest pain but can definitely feel her heart racing.  On EMS arrival the patient was found to be in SVT at a pulse of 176. En route to the ED they attempted 6mg Adenosine, followed shortly after by another 12 mg with no success. Her SpO2 was 94% en route to the ED and she was placed on 1L O2 for comfort. She denies any recent vomiting.  Denies fevers.  She denies use of blood thinning medication. Her last meal was around 1400 today.    Independent Historian   EMS as detailed above.    Review of External Notes       Past Medical History     Medical History and Problem List   Bipolar affective disorder   Foot drop  Hepatitis C  MRSA infection  Obesity  S/P gastric bypass  Tobacco abuse  Abdominal hernia with obstruction and without gangrene  Allergic rhinitis   ADHD  Bilateral incipient cataracts   Hypertension  Fibrocystic breast disease  Hepatic cirrhosis  Hypothyroidism   Lactose intolerance  Lichen simplex chronicus   Plantar fasciitis    Medications   Norvasc  Vitamin B12  Lamictal  Levaquin  Lisinopril  Roxicodone   Deltasone  Temazepam  Kenalog   Xanax  Abilify  Wellbutrin   Duricef  Celebrex  Omnicef   Austedo  Vibra-tabs  Certavite   Hydrodiuril  Vistaril  Robaxin  Klayesta  Pantoprazole   Miralax  Rifadin  Senokot  Ambien     Surgical History   Past Surgical History:   Procedure Laterality Date    C CLOSED RX PELVIC RING FX/SUBLUX  2002    left hip fractrure, ORIF    HC DRAINAGE OF OVARIAN CYST(S) ABDOMINAL APPROACH  1994    HC REMOVAL GALLBLADDER  1998    HC THYROIDECTOMY  2007    benign nodule, partial left lobe     "OPEN REDUCTION INTERNAL FIXATION HIP      TONSILLECTOMY      [facility] GASTROPLASTY,OBESITY,VERT BAND  1997    Dr. Russell       Physical Exam     Patient Vitals for the past 24 hrs:   BP Temp Temp src Pulse Resp SpO2 Height Weight   05/22/25 1930 (!) 150/117 -- -- (!) 176 30 97 % -- --   05/22/25 1925 -- -- -- (!) 176 -- 100 % -- --   05/22/25 1920 (!) 141/116 -- -- (!) 176 (!) 31 97 % -- --   05/22/25 1915 (!) 127/95 -- -- (!) 176 (!) 31 97 % -- --   05/22/25 1910 (!) 139/126 -- -- (!) 176 21 97 % -- --   05/22/25 1906 125/65 -- -- (!) 176 (!) 42 97 % -- --   05/22/25 1905 -- -- -- (!) 176 -- 97 % -- --   05/22/25 1900 (!) 140/106 -- -- (!) 174 13 94 % -- --   05/22/25 1830 (!) 116/95 -- -- (!) 174 10 97 % -- --   05/22/25 1816 -- -- -- (!) 174 -- -- -- --   05/22/25 1815 (!) 122/91 -- -- -- -- -- -- --   05/22/25 1751 -- -- -- -- -- -- -- (!) 147.6 kg (325 lb 6.4 oz)   05/22/25 1750 -- -- -- -- 21 95 % -- --   05/22/25 1744 115/87 98.6  F (37  C) Oral -- -- -- -- --   05/22/25 1741 -- -- -- (!) 176 18 -- 1.626 m (5' 4\") --     Physical Exam  Gen: well appearing, in no acute distress, appears with elevated BMI  Oral : Mucous membranes moist,   Nose: No rhinorhea  Ears: External near normal, without drainage  Eyes: periorbital tissues and sclera normal   Neck: supple, no abnormal swelling  Lungs: Clear bilaterally, no tachypnea or distress, speaks full sentences  CV: Tachycardic and regular  Abd: soft, nontender, nondistended, no rebound/guarding  Ext: Trace lower extremity edema, symmetric  Skin: warm, dry, well perfused, no rashes/bruising/lesions on exposed skin  Neuro: alert, no gross motor or sensory deficits,   Psych: pleasant mood, normal affect      Diagnostics     Lab Results   Labs Ordered and Resulted from Time of ED Arrival to Time of ED Departure   BASIC METABOLIC PANEL - Abnormal       Result Value    Sodium 141      Potassium 3.9      Chloride 106      Carbon Dioxide (CO2) 23      Anion Gap 12      Urea " Nitrogen 16.5      Creatinine 0.62      GFR Estimate >90      Calcium 9.2      Glucose 110 (*)    NT-PROBNP - Abnormal    NT-proBNP 1,648 (*)    CBC WITH PLATELETS AND DIFFERENTIAL - Abnormal    WBC Count 9.6      RBC Count 3.68 (*)     Hemoglobin 8.0 (*)     Hematocrit 26.5 (*)     MCV 72 (*)     MCH 21.7 (*)     MCHC 30.2 (*)     RDW 21.2 (*)     Platelet Count 275      % Neutrophils 72      % Lymphocytes 18      % Monocytes 8      % Eosinophils 1      % Basophils 0      % Immature Granulocytes 0      NRBCs per 100 WBC 0      Absolute Neutrophils 6.9      Absolute Lymphocytes 1.8      Absolute Monocytes 0.8      Absolute Eosinophils 0.1      Absolute Basophils 0.0      Absolute Immature Granulocytes 0.0      Absolute NRBCs 0.0     TSH WITH FREE T4 REFLEX - Normal    TSH 2.06     TROPONIN T, HIGH SENSITIVITY   MAGNESIUM   HEPATIC FUNCTION PANEL   IRON AND IRON BINDING CAPACITY   FERRITIN   VITAMIN B12       Imaging   XR Chest Port 1 View   Final Result   IMPRESSION: Left costophrenic angle was not included on the image. Magnified cardiac silhouette size and pulmonary vascularity likely due to the AP projection. No definite evidence for pulmonary edema.        EKG   ECG taken at 1745, ECG read at 1748  Critical test result: High HR  Sinus tachycardia  Left axis deviation  ST elevation, consider early repolarization, pericarditis, or injury  Nonspecific T wave abnormality  Abnormal ECG   Rate 176 bpm. NC interval 130 ms. QRS duration 88 ms. QT/QTc 292/499 ms. P-R-T axes 74 -41 11.    Independent Interpretation   CXR: No infiltrate.    ED Course      Medications Administered   Medications   propofol (DIPRIVAN) injection 10 mg/mL vial (has no administration in time range)   diltiazem (CARDIZEM) 125 mg in sodium chloride 0.9 % 125 mL infusion (15 mg/hr Intravenous $New Bag 5/22/25 1933)   heparin 25,000 units in 0.45% NaCl 250 mL ANTICOAGULANT infusion (0 Units/hr Intravenous Restarted 5/22/25 2003)   diltiazem  (CARDIZEM) injection 10 mg ( Intravenous Canceled Entry 5/22/25 1754)   diltiazem (CARDIZEM) injection 5 mg (5 mg Intravenous $Given 5/22/25 1809)   heparin ANTICOAGULANT loading dose for  LOW INTENSITY TREATMENT* Give BEFORE starting heparin infusion (6,000 Units Intravenous $Given 5/22/25 2002)       Procedures   Procedures     Discussion of Management   See ED course.    ED Course   ED Course as of 05/22/25 2030   Thu May 22, 2025   1737 I obtained history and examined the patient as noted above.   1926 Consult with Dr. Candelario of cardiology regarding the patient.    1959 I consulted with Dr. Murray of the hospitalist service and discussed patient admission. They accepted care of the patient.       Additional Documentation  None    Medical Decision Making / Diagnosis     CMS Diagnoses: None    MIPS   None             MDM   Omayra Malone is a 60 year old female presents with a narrow complex tachycardia, refractory to adenosine tried twice by EMS and route.  She is not having any current chest pain, looks well she is not hypotensive.  I am suspicious of fast atrial flutter at this time.  Given the unknown duration of symptoms, likely going on for at least several days I made the decision to try to hold off on cardioversion giving her stability.  She was given a 15 mg bolus of diltiazem and several minutes later heart rate decreased to the 135 145 range.  We attempted a new EKG but before we could get it set up heart rate was back at about 175.  Visually it did look like atrial fibrillation on the monitor when it was slower.  Diltiazem infusion started.  After about an hour on the infusion, we have gradually been increasing it, she has had no further episodes of it slowing down.  I discussed the case with Dr. Candelario, the cardiologist on-call and he agreed with avoiding cardioversion unless she becomes medically unstable or begins having intense chest pain.  He would prefer to maintain the diltiazem infusion for now  and consider OK in the morning if rate not improved.  Will plan on starting heparin in the ED tonight.  Hemoglobin has trended down since last check but patient is not aware of any active bleeding or black stools.      Critical Care time was 35 minutes for this patient excluding procedures.    Disposition   The patient was admitted to the hospital.     Diagnosis     ICD-10-CM    1. Atrial flutter with rapid ventricular response (H)  I48.92            Discharge Medications   New Prescriptions    No medications on file     Scribe Disclosure:  YULY BUSBY, am serving as a scribe at 6:09 PM on 5/22/2025 to document services personally performed by Stephan Dominguez MD, based on my observations and the provider's statements to me.        Stephan Dominguez MD  05/22/25 2030

## 2025-05-23 ENCOUNTER — APPOINTMENT (OUTPATIENT)
Dept: CARDIOLOGY | Facility: CLINIC | Age: 60
DRG: 291 | End: 2025-05-23
Payer: COMMERCIAL

## 2025-05-23 ENCOUNTER — ANESTHESIA EVENT (OUTPATIENT)
Dept: GASTROENTEROLOGY | Facility: CLINIC | Age: 60
End: 2025-05-23
Payer: COMMERCIAL

## 2025-05-23 ENCOUNTER — ANESTHESIA (OUTPATIENT)
Dept: GASTROENTEROLOGY | Facility: CLINIC | Age: 60
End: 2025-05-23
Payer: COMMERCIAL

## 2025-05-23 LAB
ALBUMIN UR-MCNC: NEGATIVE MG/DL
AMPHETAMINES UR QL SCN: ABNORMAL
ANION GAP SERPL CALCULATED.3IONS-SCNC: 9 MMOL/L (ref 7–15)
APPEARANCE UR: CLEAR
ATRIAL RATE - MUSE: 366 BPM
ATRIAL RATE - MUSE: 81 BPM
ATRIAL RATE - MUSE: 83 BPM
BARBITURATES UR QL SCN: ABNORMAL
BENZODIAZ UR QL SCN: ABNORMAL
BILIRUB UR QL STRIP: NEGATIVE
BUN SERPL-MCNC: 17.2 MG/DL (ref 8–23)
BZE UR QL SCN: ABNORMAL
CALCIUM SERPL-MCNC: 9.1 MG/DL (ref 8.8–10.4)
CANNABINOIDS UR QL SCN: ABNORMAL
CHLORIDE SERPL-SCNC: 104 MMOL/L (ref 98–107)
CK SERPL-CCNC: 29 U/L (ref 26–192)
CK SERPL-CCNC: 29 U/L (ref 26–192)
COLOR UR AUTO: NORMAL
CREAT SERPL-MCNC: 0.64 MG/DL (ref 0.51–0.95)
CRP SERPL-MCNC: 4.74 MG/L
DIASTOLIC BLOOD PRESSURE - MUSE: NORMAL MMHG
EGFRCR SERPLBLD CKD-EPI 2021: >90 ML/MIN/1.73M2
ERYTHROCYTE [DISTWIDTH] IN BLOOD BY AUTOMATED COUNT: 20.7 % (ref 10–15)
FENTANYL UR QL: ABNORMAL
FERRITIN SERPL-MCNC: 18 NG/ML (ref 11–328)
FOLATE SERPL-MCNC: 16.7 NG/ML (ref 4.6–34.8)
GLUCOSE SERPL-MCNC: 106 MG/DL (ref 70–99)
GLUCOSE UR STRIP-MCNC: NEGATIVE MG/DL
HCO3 SERPL-SCNC: 24 MMOL/L (ref 22–29)
HCT VFR BLD AUTO: 26.3 % (ref 35–47)
HGB BLD-MCNC: 7.8 G/DL (ref 11.7–15.7)
HGB BLD-MCNC: 8.7 G/DL (ref 11.7–15.7)
HGB UR QL STRIP: NEGATIVE
INTERPRETATION ECG - MUSE: NORMAL
KETONES UR STRIP-MCNC: NEGATIVE MG/DL
LEUKOCYTE ESTERASE UR QL STRIP: NEGATIVE
LVEF ECHO: NORMAL
MAGNESIUM SERPL-MCNC: 2 MG/DL (ref 1.7–2.3)
MCH RBC QN AUTO: 22 PG (ref 26.5–33)
MCHC RBC AUTO-ENTMCNC: 29.7 G/DL (ref 31.5–36.5)
MCV RBC AUTO: 73 FL (ref 78–100)
MCV RBC AUTO: 74 FL (ref 78–100)
NITRATE UR QL: NEGATIVE
OPIATES UR QL SCN: ABNORMAL
P AXIS - MUSE: 23 DEGREES
P AXIS - MUSE: 32 DEGREES
P AXIS - MUSE: 46 DEGREES
PCP QUAL URINE (ROCHE): ABNORMAL
PH UR STRIP: 5.5 [PH] (ref 5–7)
PLATELET # BLD AUTO: 249 10E3/UL (ref 150–450)
POTASSIUM SERPL-SCNC: 4.2 MMOL/L (ref 3.4–5.3)
PR INTERVAL - MUSE: 158 MS
PR INTERVAL - MUSE: 160 MS
PR INTERVAL - MUSE: NORMAL MS
QRS DURATION - MUSE: 84 MS
QRS DURATION - MUSE: 86 MS
QRS DURATION - MUSE: 88 MS
QT - MUSE: 352 MS
QT - MUSE: 366 MS
QT - MUSE: 392 MS
QTC - MUSE: 413 MS
QTC - MUSE: 455 MS
QTC - MUSE: 474 MS
R AXIS - MUSE: -29 DEGREES
R AXIS - MUSE: -32 DEGREES
R AXIS - MUSE: -43 DEGREES
RBC # BLD AUTO: 3.54 10E6/UL (ref 3.8–5.2)
SODIUM SERPL-SCNC: 137 MMOL/L (ref 135–145)
SP GR UR STRIP: 1.01 (ref 1–1.03)
SYSTOLIC BLOOD PRESSURE - MUSE: NORMAL MMHG
T AXIS - MUSE: 32 DEGREES
T AXIS - MUSE: 7 DEGREES
T AXIS - MUSE: 95 DEGREES
TROPONIN T SERPL HS-MCNC: 12 NG/L
UFH PPP CHRO-ACNC: 0.13 IU/ML (ref ?–1.1)
UFH PPP CHRO-ACNC: 0.14 IU/ML (ref ?–1.1)
UFH PPP CHRO-ACNC: 0.34 IU/ML (ref ?–1.1)
UROBILINOGEN UR STRIP-MCNC: NORMAL MG/DL
VENTRICULAR RATE- MUSE: 101 BPM
VENTRICULAR RATE- MUSE: 81 BPM
VENTRICULAR RATE- MUSE: 83 BPM
VIT B12 SERPL-MCNC: <150 PG/ML (ref 232–1245)
WBC # BLD AUTO: 9.1 10E3/UL (ref 4–11)

## 2025-05-23 PROCEDURE — 83735 ASSAY OF MAGNESIUM: CPT

## 2025-05-23 PROCEDURE — 86140 C-REACTIVE PROTEIN: CPT | Performed by: HOSPITALIST

## 2025-05-23 PROCEDURE — 80307 DRUG TEST PRSMV CHEM ANLYZR: CPT | Performed by: HOSPITALIST

## 2025-05-23 PROCEDURE — 85048 AUTOMATED LEUKOCYTE COUNT: CPT

## 2025-05-23 PROCEDURE — 210N000001 HC R&B IMCU HEART CARE

## 2025-05-23 PROCEDURE — 85520 HEPARIN ASSAY: CPT

## 2025-05-23 PROCEDURE — 93306 TTE W/DOPPLER COMPLETE: CPT | Mod: 26 | Performed by: INTERNAL MEDICINE

## 2025-05-23 PROCEDURE — 36415 COLL VENOUS BLD VENIPUNCTURE: CPT

## 2025-05-23 PROCEDURE — 5A2204Z RESTORATION OF CARDIAC RHYTHM, SINGLE: ICD-10-PCS | Performed by: STUDENT IN AN ORGANIZED HEALTH CARE EDUCATION/TRAINING PROGRAM

## 2025-05-23 PROCEDURE — 82746 ASSAY OF FOLIC ACID SERUM: CPT

## 2025-05-23 PROCEDURE — 99233 SBSQ HOSP IP/OBS HIGH 50: CPT | Performed by: HOSPITALIST

## 2025-05-23 PROCEDURE — 250N000011 HC RX IP 250 OP 636: Mod: JZ | Performed by: HOSPITALIST

## 2025-05-23 PROCEDURE — 81003 URINALYSIS AUTO W/O SCOPE: CPT | Performed by: HOSPITALIST

## 2025-05-23 PROCEDURE — 93306 TTE W/DOPPLER COMPLETE: CPT

## 2025-05-23 PROCEDURE — 258N000003 HC RX IP 258 OP 636: Performed by: INTERNAL MEDICINE

## 2025-05-23 PROCEDURE — 250N000011 HC RX IP 250 OP 636: Performed by: INTERNAL MEDICINE

## 2025-05-23 PROCEDURE — 250N000011 HC RX IP 250 OP 636

## 2025-05-23 PROCEDURE — 250N000013 HC RX MED GY IP 250 OP 250 PS 637: Performed by: INTERNAL MEDICINE

## 2025-05-23 PROCEDURE — 250N000013 HC RX MED GY IP 250 OP 250 PS 637: Performed by: HOSPITALIST

## 2025-05-23 PROCEDURE — 80051 ELECTROLYTE PANEL: CPT

## 2025-05-23 PROCEDURE — 84484 ASSAY OF TROPONIN QUANT: CPT | Performed by: EMERGENCY MEDICINE

## 2025-05-23 PROCEDURE — 258N000003 HC RX IP 258 OP 636: Performed by: HOSPITALIST

## 2025-05-23 PROCEDURE — 36415 COLL VENOUS BLD VENIPUNCTURE: CPT | Performed by: HOSPITALIST

## 2025-05-23 PROCEDURE — 99223 1ST HOSP IP/OBS HIGH 75: CPT | Mod: 25 | Performed by: INTERNAL MEDICINE

## 2025-05-23 PROCEDURE — 85018 HEMOGLOBIN: CPT | Performed by: HOSPITALIST

## 2025-05-23 PROCEDURE — 36415 COLL VENOUS BLD VENIPUNCTURE: CPT | Performed by: EMERGENCY MEDICINE

## 2025-05-23 PROCEDURE — 85520 HEPARIN ASSAY: CPT | Performed by: HOSPITALIST

## 2025-05-23 PROCEDURE — 82550 ASSAY OF CK (CPK): CPT | Performed by: HOSPITALIST

## 2025-05-23 PROCEDURE — 250N000013 HC RX MED GY IP 250 OP 250 PS 637

## 2025-05-23 PROCEDURE — 93005 ELECTROCARDIOGRAM TRACING: CPT

## 2025-05-23 RX ORDER — HEPARIN SODIUM 10000 [USP'U]/100ML
0-5000 INJECTION, SOLUTION INTRAVENOUS CONTINUOUS
Status: DISCONTINUED | OUTPATIENT
Start: 2025-05-23 | End: 2025-05-30

## 2025-05-23 RX ORDER — FUROSEMIDE 10 MG/ML
40 INJECTION INTRAMUSCULAR; INTRAVENOUS ONCE
Status: COMPLETED | OUTPATIENT
Start: 2025-05-23 | End: 2025-05-23

## 2025-05-23 RX ORDER — FUROSEMIDE 10 MG/ML
20 INJECTION INTRAMUSCULAR; INTRAVENOUS ONCE
Status: DISCONTINUED | OUTPATIENT
Start: 2025-05-23 | End: 2025-05-23

## 2025-05-23 RX ORDER — LIDOCAINE 40 MG/G
CREAM TOPICAL
Status: CANCELLED | OUTPATIENT
Start: 2025-05-23

## 2025-05-23 RX ORDER — MULTIVITAMIN WITH IRON
500 TABLET ORAL DAILY
Status: DISCONTINUED | OUTPATIENT
Start: 2025-05-23 | End: 2025-05-29

## 2025-05-23 RX ORDER — METOPROLOL TARTRATE 25 MG/1
25 TABLET, FILM COATED ORAL 2 TIMES DAILY
Status: DISCONTINUED | OUTPATIENT
Start: 2025-05-23 | End: 2025-05-29

## 2025-05-23 RX ADMIN — METOPROLOL TARTRATE 12.5 MG: 25 TABLET, FILM COATED ORAL at 08:05

## 2025-05-23 RX ADMIN — FUROSEMIDE 40 MG: 10 INJECTION, SOLUTION INTRAMUSCULAR; INTRAVENOUS at 11:49

## 2025-05-23 RX ADMIN — FUROSEMIDE 10 MG/HR: 10 INJECTION, SOLUTION INTRAVENOUS at 12:01

## 2025-05-23 RX ADMIN — DOXYCYCLINE HYCLATE 100 MG: 100 CAPSULE ORAL at 08:05

## 2025-05-23 RX ADMIN — LAMOTRIGINE 100 MG: 100 TABLET ORAL at 08:04

## 2025-05-23 RX ADMIN — METOPROLOL TARTRATE 25 MG: 25 TABLET, FILM COATED ORAL at 20:43

## 2025-05-23 RX ADMIN — MAGNESIUM SULFATE HEPTAHYDRATE 2 G: 40 INJECTION, SOLUTION INTRAVENOUS at 02:07

## 2025-05-23 RX ADMIN — ACETAMINOPHEN 975 MG: 325 TABLET, FILM COATED ORAL at 20:55

## 2025-05-23 RX ADMIN — HEPARIN SODIUM 1350 UNITS/HR: 10000 INJECTION, SOLUTION INTRAVENOUS at 08:07

## 2025-05-23 RX ADMIN — FUROSEMIDE 10 MG/HR: 10 INJECTION, SOLUTION INTRAVENOUS at 21:28

## 2025-05-23 RX ADMIN — OXYCODONE HYDROCHLORIDE 5 MG: 5 TABLET ORAL at 08:04

## 2025-05-23 RX ADMIN — FUROSEMIDE 40 MG: 10 INJECTION, SOLUTION INTRAMUSCULAR; INTRAVENOUS at 10:38

## 2025-05-23 RX ADMIN — PANTOPRAZOLE SODIUM 40 MG: 40 INJECTION, POWDER, FOR SOLUTION INTRAVENOUS at 20:43

## 2025-05-23 RX ADMIN — METOPROLOL TARTRATE 12.5 MG: 25 TABLET, FILM COATED ORAL at 00:06

## 2025-05-23 RX ADMIN — PANTOPRAZOLE SODIUM 40 MG: 40 INJECTION, POWDER, FOR SOLUTION INTRAVENOUS at 10:38

## 2025-05-23 RX ADMIN — IRON SUCROSE 300 MG: 20 INJECTION, SOLUTION INTRAVENOUS at 12:05

## 2025-05-23 RX ADMIN — OXYCODONE HYDROCHLORIDE 5 MG: 5 TABLET ORAL at 00:06

## 2025-05-23 RX ADMIN — DOXYCYCLINE HYCLATE 100 MG: 100 CAPSULE ORAL at 20:43

## 2025-05-23 RX ADMIN — HEPARIN SODIUM 1500 UNITS/HR: 10000 INJECTION, SOLUTION INTRAVENOUS at 23:25

## 2025-05-23 RX ADMIN — CYANOCOBALAMIN TAB 500 MCG 500 MCG: 500 TAB at 18:54

## 2025-05-23 RX ADMIN — POTASSIUM CHLORIDE 20 MEQ: 1500 TABLET, EXTENDED RELEASE ORAL at 00:06

## 2025-05-23 RX ADMIN — MICONAZOLE NITRATE: 2 POWDER TOPICAL at 18:47

## 2025-05-23 RX ADMIN — DOXYCYCLINE HYCLATE 100 MG: 100 CAPSULE ORAL at 00:06

## 2025-05-23 ASSESSMENT — ACTIVITIES OF DAILY LIVING (ADL)
ADLS_ACUITY_SCORE: 58
ADLS_ACUITY_SCORE: 46
ADLS_ACUITY_SCORE: 64
ADLS_ACUITY_SCORE: 58
DEPENDENT_IADLS:: CLEANING;COOKING;LAUNDRY;SHOPPING;MEAL PREPARATION;TRANSPORTATION
ADLS_ACUITY_SCORE: 64
ADLS_ACUITY_SCORE: 58
ADLS_ACUITY_SCORE: 46
ADLS_ACUITY_SCORE: 64
ADLS_ACUITY_SCORE: 46
ADLS_ACUITY_SCORE: 46
ADLS_ACUITY_SCORE: 41
ADLS_ACUITY_SCORE: 58
ADLS_ACUITY_SCORE: 46
ADLS_ACUITY_SCORE: 46
ADLS_ACUITY_SCORE: 64
ADLS_ACUITY_SCORE: 41
ADLS_ACUITY_SCORE: 46
ADLS_ACUITY_SCORE: 46
ADLS_ACUITY_SCORE: 58
ADLS_ACUITY_SCORE: 46
ADLS_ACUITY_SCORE: 58
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
## 2025-05-23 NOTE — PLAN OF CARE
Goal Outcome Evaluation:      Plan of Care Reviewed With: patient, caregiver ([facility] staff RN)          Outcome Evaluation: Back to         Shawn Ragsdale, RN, BSN, Care Coordinator

## 2025-05-23 NOTE — CONSULTS
GASTROENTEROLOGY CONSULTATION      Omayra Malone  [address]  [facility] [address]  60 year old female     Admission Date/Time: 5/22/2025  Primary Care Provider: Tobi Mahoney  Referring / Attending Physician:  Dr. Juan     We were asked to see the patient in consultation by Dr. Juan for evaluation of iron deficiency anemia.        HPI:  Omayra Malone is a 60 year old female with a history of decompensated HF, A flutter s/p Cardioversion, tobacco use, Cirrhosis, Hep C (treated), Obesity, and bipolar disorder. She has a past surgical history of gastric bypass, cholecystectomy and thyroidectomy.    [facility] was asked to consult due to marked anemia. Patient's HGB on arrival 8.0 (recheck 7.8) with MCF 74 and low Iron 16. No overt melena, hematochezia, coffee ground emesis, hematemesis. Cardiology team recommending upper endoscopy because a transesophageal echocardiogram is needed for further evaluation of treatment of the patient's arrhythmia s and candidacy for long-term anticoagulation. Patient has never had a colonoscopy. Patient denies dysphagia, odynophagia, frequent heartburn, upper abdominal pain, unintentional weight loss. Endorses some nausea.    Patient has past history of cirrhosis 2/2 Hep C (treated) and metabolic disease. Has not been seen by [facility] since 2021. At that time, reportedly was well compensated. Normal LFTs and PLTs here. CT CAP ordered by hospitalist team and pending. Patient denies abdominal pain, jaundice, f/ch.      Does not drink ETOH. Smokes 1/2 PPD cigarettes x 40yrs. Unsure of family history, as she is adopted. Lives in a group home.    Last ate breakfast 7:15am per nursing.       PAST MEDICAL HISTORY:  Patient Active Problem List    Diagnosis Date Noted    Atrial flutter with rapid ventricular response (H) 05/22/2025     Priority: Medium    CARDIOVASCULAR SCREENING; LDL GOAL LESS THAN 160 10/31/2010     Priority: Medium    Bipolar affective disorder (H) 03/16/2009      Priority: Medium    Vitamin B 12 deficiency 12/04/2008     Priority: Medium    Hepatitis C      Priority: Medium     not sick      Hx MRSA infection      Priority: Medium    S/P gastric bypass      Priority: Medium          ROS: A comprehensive ten point review of systems was negative aside from those in mentioned in the HPI.       MEDICATIONS:   Prior to Admission medications    Medication Sig Start Date End Date Taking? Authorizing Provider   amLODIPine (NORVASC) 10 MG tablet Take 10 mg by mouth daily   Yes Reported, Patient   doxycycline hyclate (VIBRA-TABS) 100 MG tablet Take 100 mg by mouth 2 times daily.   Yes Unknown, Entered By History   hydrochlorothiazide (HYDRODIURIL) 25 MG tablet Take 25 mg by mouth daily.   Yes Unknown, Entered By History   lamoTRIgine (LAMICTAL) 100 MG tablet Take 100 mg by mouth daily.   Yes Unknown, Entered By History   nystatin (MYCOSTATIN) 122085 UNIT/GM external powder Apply topically daily as needed for dry skin. To groin and under breasts   Yes Unknown, Entered By History   oxyCODONE (ROXICODONE) 5 MG tablet Take 5 mg by mouth every 8 hours.   Yes Unknown, Entered By History   ALPRAZolam (XANAX) 2 MG tablet Take 2 mg by mouth 2 times daily as needed for anxiety.  Patient not taking: Reported on 5/22/2025    Unknown, Entered By History        ALLERGIES:   Allergies   Allergen Reactions    Gabapentin Swelling    Latex Rash    Sulfa Antibiotics Swelling        SOCIAL HISTORY:  Social History     Tobacco Use    Smoking status: Every Day     Current packs/day: 0.50     Average packs/day: 0.5 packs/day for 30.0 years (15.0 ttl pk-yrs)     Types: Cigarettes    Smokeless tobacco: Never    Tobacco comments:     2-3 cigs a day   Substance Use Topics    Alcohol use: No    Drug use: No     Comment: hx of cocaine abuse        FAMILY HISTORY:  Family History   Adopted: Yes   Problem Relation Age of Onset    Unknown/Adopted No family hx of         PHYSICAL EXAM:     /77 (BP Location:  "Right arm)   Pulse 76   Temp 99.1  F (37.3  C) (Oral)   Resp 20   Ht 1.626 m (5' 4\")   Wt (!) 145.9 kg (321 lb 9.6 oz)   SpO2 95%   BMI 55.20 kg/m       PHYSICAL EXAM:  GENERAL:  Obese adult female laying on gurney getting echocardiogram. NAD. Nontoxic.  SKIN: no suspicious lesions, rashes, jaundice  HEAD: Normocephalic. Atraumatic.  NECK: Neck supple. No adenopathy.   EYES: No scleral icterus  RESPIRATORY: No respiratory distress.   CARDIOVASCULAR: RRR  GASTROINTESTINAL: +BS, soft, non tender, non distended, no guarding/rebound  JOINT/EXTREMITIES:  no gross deformities noted, normal muscle tone  NEURO: CN 2-12 grossly intact, no focal deficits  PSYCH: Normal affect     ADDITIONAL COMMENTS:   I reviewed the patient's new clinical lab test results.     Recent Labs   Lab 05/23/25  0601 05/22/25 1743   WBC 9.1 9.6   RBC 3.54* 3.68*   HGB 7.8* 8.0*   HCT 26.3* 26.5*   MCV 74* 72*   MCH 22.0* 21.7*   MCHC 29.7* 30.2*   RDW 20.7* 21.2*    275     Recent Labs   Lab Test 05/23/25  0601 05/22/25  1743 05/15/24  1941   POTASSIUM 4.2 3.9 3.4   CHLORIDE 104 106 108*   CO2 24 23 26   BUN 17.2 16.5 15.5   ANIONGAP 9 12 13     Recent Labs   Lab Test 05/22/25  1743 05/15/24  1941 02/01/24  1301   ALBUMIN 3.7 4.0  --    BILITOTAL 0.2 0.2  --    ALT 12 28  --    AST 15 18  --    PROTEIN  --   --  Negative          IMAGING  Recent Results (from the past 24 hours)   XR Chest Port 1 View    Narrative    EXAM: XR CHEST PORT 1 VIEW  LOCATION: [facility]  DATE: 5/22/2025    INDICATION: Supraventricular tachycardia. Shortness of breath. Evaluate for congestive heart failure.  COMPARISON: Chest x-ray 5/15/2024      Impression    IMPRESSION: Left costophrenic angle was not included on the image. Magnified cardiac silhouette size and pulmonary vascularity likely due to the AP projection. No definite evidence for pulmonary edema.   -Cardioversion External    Narrative    Cheko Tse MD     " 5/23/2025  1:40 AM  [facility]    -Cardioversion External    Date/Time: 5/22/2025 11:05 PM    Performed by: Cheko Tse MD  Authorized by: Cheko Tse MD    Risks, benefits and alternatives discussed.      UNIVERSAL PROTOCOL   Site Marked: NA  Prior Images Obtained and Reviewed:  NA  Required items: Required blood products, implants, devices and special   equipment available    Patient identity confirmed:  Verbally with patient and arm band  Patient was reevaluated immediately before administering moderate or deep   sedation or anesthesia  Confirmation Checklist:  Relevant allergies, patient's identity using two   indicators, correct equipment/implants were available and procedure was   appropriate and matched the consent or emergent situation  Time out: Immediately prior to the procedure a time out was called    Universal Protocol: the Joint Commission Universal Protocol was followed      PRE-PROCEDURE DETAILS:     Cardioversion basis:  Emergent    Rhythm:  Atrial flutter    Electrode placement:  Anterior-posterior  Attempt one:     Cardioversion mode:  Synchronous    Waveform:  Biphasic    Shock (Joules):  200    Shock outcome:  Conversion to normal sinus rhythm  Post-procedure details:     Patient status:  Alert      PROCEDURE  Describe Procedure: Pads were applied anterior posterior.  After sedation   with etomidate, cardioversion was attempted with 200 J, synchronized.    Patient converted to normal sinus rhythm on the first attempt.  Patient Tolerance:  Patient tolerated the procedure well with no immediate   complications         CONSULTATION ASSESSMENT:    Omayra Malone is a 60 year old female with a history of decompensated HF, A flutter s/p Cardioversion, Cirrhosis, Hep C (treated), Obesity, and bipolar disorder. She has a past surgical history of gastric bypass, cholecystectomy and thyroidectomy.    Anemia:  [facility] was asked to consult due to marked anemia. Patient's  HGB on arrival 8.0 (recheck 7.8) with MCF 74 and low Iron 16. No overt melena, hematochezia, coffee ground emesis, hematemesis.   -Monitor for overt GIB  -Monitor HGB and transfuse PRN  -Plan for EGD Tuesday (unless decompensation can do more urgently over the weekend)  -Patient overdue for colonoscopy- will discuss inpatient vs. outpatient with Dr. Wesley    Cirrhosis:  Patient has past history of cirrhosis 2/2 Hep C (treated) and metabolic disease. Has not been seen by [facility] since 2021. At that time, reportedly was well compensated. Normal LFTs and PLTs here on lab studies.  -CT CAP pending  -Outpatient follow up HEP clinic to reestablish care    Will discuss plan with Dr. Wesley, GI staff physician. Thank you for asking us to participate in the care of this patient.    48 min of total time was spent providing patient care, including patient evaluation, reviewing documentation/ test results, and .     Loreto Moser PA-C  [facility] ( [facility])

## 2025-05-23 NOTE — PHARMACY-ADMISSION MEDICATION HISTORY
Pharmacy Intern Admission Medication History    Admission medication history is complete. The information provided in this note is only as accurate as the sources available at the time of the update.    Information Source(s): Patient and CareEverywhere/SureScripts via in-person    Pertinent Information: Takes oxycodone regularly and doxycycline chronically     Changes made to PTA medication list:  Added: Doxycycline, nystatin powder, hydrochlorothiazide  Deleted: Alprazolam currently held, no longer on aripiprazole, bupropion, temazepam, prednisone, lisinopril  Changed: Lamotrigine 25 mg x2 tabs BID --> 100 mg x1 tab QD    Allergies reviewed with patient and updates made in EHR: yes    Medication History Completed By: SARBJIT ASENCIO 5/22/2025 8:57 PM    PTA Med List   Medication Sig Note Last Dose/Taking    amLODIPine (NORVASC) 10 MG tablet Take 10 mg by mouth daily  5/22/2025 Morning    doxycycline hyclate (VIBRA-TABS) 100 MG tablet Take 100 mg by mouth 2 times daily. 5/22/2025: Taking chronically 5/22/2025 Morning    hydrochlorothiazide (HYDRODIURIL) 25 MG tablet Take 25 mg by mouth daily.  5/22/2025 Morning    lamoTRIgine (LAMICTAL) 100 MG tablet Take 100 mg by mouth daily.  5/22/2025 Morning    nystatin (MYCOSTATIN) 352917 UNIT/GM external powder Apply topically daily as needed for dry skin. To groin and under breasts  5/22/2025 Morning    oxyCODONE (ROXICODONE) 5 MG tablet Take 5 mg by mouth every 8 hours.  5/22/2025 Morning

## 2025-05-23 NOTE — PROGRESS NOTES
[facility]  Hospitalist Progress Note   05/23/2025          Assessment and Plan:       Omayra Malone is a 60 year old female with history of hypertension, hepatitis C, liver cirrhosis, chronic anemia, obesity status post gastric bypass, bipolar affective disorder, MEREDITH, hypothyroidism, osteoarthritis admitted on 5/22/2025 for shortness of breath, palpitations.    Shortness of breath multifactorial from possible heart failure exacerbation,  Anemia, obstructive sleep apnea, OHS, deconditioning  Atrial flutter with RVR- new.  Hypertension.  Elevated proBNP, concern for heart failure.  - Patient resident of [facility], report of shortness of breath and palpitations.  On EMS arrival the patient was found to be in SVT at a pulse of 176. En route to the ED they attempted 6mg Adenosine, followed shortly after by another 12 mg with no success. Her SpO2 was 94% en route to the ED. in ED heart rate in 170s, blood pressure in 120s systolic.  Sodium 141, potassium 3.9, creatinine 0.62.  WBC 9.6, TSH 2.06.  N-terminal proBNP 1648.  Troponin high-sensitivity 12 >1  Chest x-ray no definitive evidence for pulmonary edema, no concerns for infection.  EKG sinus tachycardia, left axis deviation, ST-T wave changes.  --Patient was started on intravenous Cardizem drip, intravenous heparin drip with which heart rate improved.  Patient was admitted to cardiac unit, cardioverted successfully to sinus rhythm.  -- Continue intravenous heparin drip.  Monitor hemoglobin levels closely.  Continue metoprolol 12.5 mg twice daily with hold parameters.  Will start on intravenous Lasix for diuresis.  Hold PTA amlodipine, hydrochlorothiazide.  Continue telemetry monitoring.  Echocardiogram pending.    Keep potassium around 4, magnesium around 2.  Cardiology consultation requested.  Appreciate input.  Urine drug screen.  Intake output monitoring, daily weights.  2000 mL fluid restriction.     Acute on chronic anemia.  Microcytic anemia,  severe iron deficiency.  Vitamin B12 deficiency.  Hemoglobin 8.0 from previous 11.8 1-year ago, patient denies bleeding or melena. Does have history of gastric bypass does not appear to be on B12.  Appears volume overloaded  Iron saturation index 4, vitamin B12 150.  CK29.  Folate 16.7.  -IV Venofer for 2 doses oral iron supplements on discharge.  Will start on vitamin B12 replacement.  Consent for blood transfusion obtained, transfuse for hemoglobin less than  7.5 or symptomatic.    Started on IV pantoprazole.  CT chest abdomen and pelvis t history of nicotine dependence, not up-to-date with health maintenance.  Gastroenterology consult requested.  No plans for endoscopy today.  Appreciate input.  UA to evaluate for hematuria.    Cirrhosis secondary to Hep C and NAFLD  Noted- per chart review, [facility] 2021 well-compensated cirrhosis due to Hep C (treated) and NAFLD.   CT abdominal pelvis pending.  IV pantoprazole as above.    Left hand pain  Sudden pain while holding something, no known MOA, appears swollen and painful.   Afebrile, no leukocytosis CRP 4.74, CK20 9.  Cold compress.  Monitor in a.m., will consider imaging if symptoms not improving.     Bipolar affective disorder  Anxiety  - continue PTA lamictal 100mg daily  - Continue PTA xanax PRN at reduce dose (does not take when she is prescribed oxycodone which she currently is for leg pain)     Chronic leg and back pain   Follows with [facility] pain clinic, chronically on oxycodone.  - Continue PTA oxycodone 5mg q8prn, minimize narcotic use as able to.     History of hypothyroidism.  PTA not on meds.  TSH within normal limits     Medically complicated obesity s/p gastric bypass  Body mass index is 55.85 kg/m . Recommend outpatient follow up/ongoing weight loss.      MEREDITH  Does not use CPAP  Will need to consider CPAP.     Chronic doxycycline use  No clear indication on chart review, reports chronically used.  - continue for now     Physical deconditioning from  "medical illness.  PT, OT evaluation prior to discharge.  Fall precautions.  Encourage ambulation out of bed as able to.    Nicotine use.  Reports intermittent vaping.  Emphasized need to consider abstinence, nicotine patch ordered.  Urine drug screen, some report of concern for cocaine use from group home staff to nursing team.       Clinically Significant Risk Factors Present on Admission        # Anemia: based on hgb <11       # Morbid Obesity: Estimated body mass index is 55.2 kg/m  as calculated from the following:    Height as of this encounter: 1.626 m (5' 4\").    Weight as of this encounter: 145.9 kg (321 lb 9.6 oz).           Orders Placed This Encounter      Regular Diet Adult      DVT Prophylaxis: SCDs, on intravenous heparin  Code Status: Full Code  Disposition: Expected discharge in greater than 2 days.    Medically Ready for Discharge: Anticipated in 2-4 Days     Discussed with patient, bedside RN, Cardiologist, gastroenterologist.  Total time greater than 51 minutes.  More than 70% of time spent in direct patient care, care coordination, patient counseling, and formalizing plan of care.        Loki Anderson MD        Interval History:        Patient lying in bed.  Drowsy but arousable.  Denies any chest pain.  Reporting shortness of breath on rest.  Has not ambulated out of bed.  Complaining of left hand pain, receiving oxycodone.  Denies any palpitations.  Denies any abdominal pain.  No nausea or vomiting.  Had breakfast this morning.  Telemetry sinus rhythm with PACs.  Denies any blood in the stools or blood in the urine.  Denies any history of drug use         Physical Exam:        Physical Exam   Temp:  [98.6  F (37  C)-99.1  F (37.3  C)] 98.6  F (37  C)  Pulse:  [] 60  Resp:  [10-42] 20  BP: (115-157)/() 157/97  SpO2:  [91 %-100 %] 95 %    Intake/Output Summary (Last 24 hours) at 5/23/2025 1419  Last data filed at 5/23/2025 1326  Gross per 24 hour   Intake 1030 ml   Output 2900 ml "   Net -1870 ml       Admission Weight: (!) 147.6 kg (325 lb 6.4 oz)  Current Weight: (!) 145.9 kg (321 lb 9.6 oz)    PHYSICAL EXAM  GENERAL: Patient is in no distress.  Drowsy but arousable.  HEENT: Oropharynx pink  HEART: Regular rate and rhythm. S1S2.   LUNGS: Bilateral decreased breath sounds, technically difficult due to body habitus.  Respirations unlabored  ABDOMEN: Soft, distended, no abdominal tenderness, bowel sounds heard   NEURO: Moving all extremities.  Left thumb area some restricted range of motion.  No erythema, no tenderness.  EXTREMITIES: ++ pedal edema.   SKIN: Warm, dry. No rash   PSYCHIATRY Cooperative       Medications:        Current Facility-Administered Medications   Medication Dose Route Frequency Provider Last Rate Last Admin    doxycycline hyclate (VIBRAMYCIN) capsule 100 mg  100 mg Oral BID Loco Murray MD   100 mg at 05/23/25 0805    iron sucrose (VENOFER) 300 mg in sodium chloride 0.9 % 290 mL intermittent infusion  300 mg Intravenous Daily Loki Anderson .3 mL/hr at 05/23/25 1205 300 mg at 05/23/25 1205    lamoTRIgine (LaMICtal) tablet 100 mg  100 mg Oral Daily Loco Murray MD   100 mg at 05/23/25 0804    metoprolol tartrate (LOPRESSOR) tablet 25 mg  25 mg Oral BID Dillan Cardoza MD        nicotine (NICODERM CQ) 7 MG/24HR 24 hr patch 1 patch  1 patch Transdermal Daily Loki Anderson MD        pantoprazole (PROTONIX) IV push injection 40 mg  40 mg Intravenous BID Loki Anderson MD   40 mg at 05/23/25 1038    sodium chloride (PF) 0.9% PF flush 3 mL  3 mL Intracatheter Q8H [facility] Loco Murray MD         Current Facility-Administered Medications   Medication Dose Route Frequency Provider Last Rate Last Admin    acetaminophen (TYLENOL) tablet 975 mg  975 mg Oral Q6H PRN Loco Murray MD        ALPRAZolam (XANAX) tablet 1 mg  1 mg Oral Daily PRN Loco Murray MD        calcium carbonate (TUMS) chewable tablet 1,000 mg  1,000 mg Oral 4x Daily PRN Loco Murray MD         lidocaine (LMX4) cream   Topical Q1H PRN Loco Murray MD        lidocaine 1 % 0.1-1 mL  0.1-1 mL Other Q1H PRN Loco Murray MD        melatonin tablet 5 mg  5 mg Oral At Bedtime PRN Loco Murray MD        miconazole (MICATIN) 2 % powder   Topical Daily PRN Loco Murray MD        naloxone (NARCAN) injection 0.2 mg  0.2 mg Intravenous Q2 Min PRN Loco Murray MD        Or    naloxone (NARCAN) injection 0.4 mg  0.4 mg Intravenous Q2 Min PRN Loco Murray MD        Or    naloxone (NARCAN) injection 0.2 mg  0.2 mg Intramuscular Q2 Min PRLoco Grigsby MD        Or    naloxone (NARCAN) injection 0.4 mg  0.4 mg Intramuscular Q2 Min PRN Loco Murray MD        oxyCODONE (ROXICODONE) tablet 5 mg  5 mg Oral Q8H PRN Loco Murray MD   5 mg at 05/23/25 0804    Patient is already receiving anticoagulation with heparin, enoxaparin (LOVENOX), warfarin (COUMADIN)  or other anticoagulant medication   Does not apply Continuous PRN Loco Murray MD        senna-docusate (SENOKOT-S/PERICOLACE) 8.6-50 MG per tablet 1 tablet  1 tablet Oral BID PRN Loco Murray MD        Or    senna-docusate (SENOKOT-S/PERICOLACE) 8.6-50 MG per tablet 2 tablet  2 tablet Oral BID PRN oLco Murray MD        sodium chloride (PF) 0.9% PF flush 3 mL  3 mL Intracatheter q1 min prLoco Grigsby MD   3 mL at 05/23/25 1149            Data:      All new lab and imaging data was reviewed.
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
## 2025-05-23 NOTE — ED PROVIDER NOTES
[facility]    -Cardioversion External    Date/Time: 5/22/2025 11:05 PM    Performed by: Cheko Tse MD  Authorized by: Cheko Tse MD    Risks, benefits and alternatives discussed.      PRE-PROCEDURE DETAILS:     Cardioversion basis:  Emergent    Rhythm:  Atrial flutter    Electrode placement:  Anterior-posterior  Attempt one:     Cardioversion mode:  Synchronous    Waveform:  Biphasic    Shock (Joules):  200    Shock outcome:  Conversion to normal sinus rhythm  Post-procedure details:     Patient status:  Alert      PROCEDURE    Patient Tolerance:  Patient tolerated the procedure well with no immediate complications  [facility]    Procedure: Sedation    Date/Time: 5/22/2025 11:05 PM    Performed by: Cheko Tse MD  Authorized by: Cheko Tse MD    Risks, benefits and alternatives discussed.    ED EVALUATION:      Assessment Time: 5/22/2025 10:52 PM      ASA Class: Class 3- Severe systemic disease, definite functional limitations    Mallampati: Grade 3- soft palate visible, posterior pharyngeal wall not visible    NPO Status: appropriately NPO for procedure    UNIVERSAL PROTOCOL   Site Marked: NA  Prior Images Obtained and Reviewed:  NA  Required items: Required blood products, implants, devices and special equipment available    Patient identity confirmed:  Verbally with patient, arm band and provided demographic data  Patient was reevaluated immediately before administering moderate or deep sedation or anesthesia  Confirmation Checklist:  Patient's identity using two indicators, relevant allergies, procedure was appropriate and matched the consent or emergent situation and correct equipment/implants were available  Time out: Immediately prior to the procedure a time out was called    Universal Protocol: the Joint Commission Universal Protocol was followed    Preparation: Patient was prepped and draped in usual sterile fashion       SEDATION  Patient Sedated: Yes    Sedation Type:  Moderate (conscious) sedation  Sedation:  Etomidate  Vital signs: Vital signs monitored during sedation      PROCEDURE    Patient Tolerance:  Patient tolerated the procedure well with no immediate complications  Length of time physician/provider present for 1:1 monitoring during sedation: 5 (6)      Universal Protocol was followed    Preparation: Patient was prepped and draped in usual sterile fashion      SEDATION  Patient Sedated: Yes    Sedation Type:  Moderate (conscious) sedation  Sedation:  Etomidate  Vital signs: Vital signs monitored during sedation      PROCEDURE    Patient Tolerance:  Patient tolerated the procedure well with no immediate complications  Length of time physician/provider present for 1:1 monitoring during sedation: 5 (6)        Cheko Tse MD  05/23/25 0140

## 2025-05-23 NOTE — PROGRESS NOTES
Admission/Transfer from: ED transfer  2 RN skin assessment completed. Yes  Significant findings include: redness/rash under L breast and blanchable redness to abd folds, scab to R forearm and scattered bruising   WOC Nurse Consult Ordered? No

## 2025-05-23 NOTE — CONSULTS
Care Management Initial Consult    General Information  Assessment completed with: Patient, Care Team Member ( staff RNArnaldo), Josr ( RN)  Type of CM/SW Visit: Initial Assessment    Primary Care Provider verified and updated as needed: Yes   Readmission within the last 30 days: no previous admission in last 30 days      Reason for Consult: discharge planning  Advance Care Planning: Advance Care Planning Reviewed: no concerns identified          Communication Assessment  Patient's communication style: spoken language (English or Bilingual)             Cognitive  Cognitive/Neuro/Behavioral: WDL                      Living Environment:   People in home: facility resident     Current living Arrangements: group home      Able to return to prior arrangements: yes       Family/Social Support:  Care provided by: self, other (see comments) ( staff)  Provides care for: no one, unable/limited ability to care for self, no one  Marital Status: Single  Support system: Significant Other, Facility resident(s)/Staff, Children          Description of Support System: Supportive    Support Assessment: Patient communicates needs well met    Current Resources:   Patient receiving home care services: No        Community Resources: Group Home  Equipment currently used at home: walker, rolling  Supplies currently used at home: Diabetic Supplies    Employment/Financial:  Employment Status: disabled        Financial Concerns: none   Referral to Financial Worker: No       Does the patient's insurance plan have a 3 day qualifying hospital stay waiver?  No    Lifestyle & Psychosocial Needs:  Social Drivers of Health     Food Insecurity: No Food Insecurity (10/18/2024)    Received from [facility]    Food Insecurity     Do you worry your food will run out before you are able to buy more?: 1   Depression: Not at risk (12/31/2024)    Received from [facility]    PHQ-2      PHQ-2 TOTAL SCORE: 2   Housing Stability: Low Risk  (10/18/2024)    Received from [facility]    Housing Stability     What is your housing situation today?: 1   Tobacco Use: High Risk (1/9/2025)    Received from [facility]    Patient History     Smoking Tobacco Use: Some Days     Smokeless Tobacco Use: Former     Passive Exposure: Not on file   Financial Resource Strain: Low Risk  (10/18/2024)    Received from [facility]    Financial Resource Strain     Difficulty of Paying Living Expenses: 3     Difficulty of Paying Living Expenses: Not on file   Alcohol Use: Not on file   Transportation Needs: No Transportation Needs (10/18/2024)    Received from [facility]    Transportation Needs     Does lack of transportation keep you from medical appointments?: 1     Does lack of transportation keep you from work, meetings or getting things that you need?: 1   Physical Activity: Not on file   Interpersonal Safety: Not At Risk (7/24/2024)    Received from [facility]     Humiliation, Afraid, Rape, and Kick questionnaire     Fear of Current or Ex-Partner: No     Emotionally Abused: No     Physically Abused: No     Sexually Abused: No   Stress: Not on file   Social Connections: Socially Integrated (10/18/2024)    Received from [facility]    Social Connections     Do you often feel lonely or isolated from those around you?: 0   Health Literacy: Not on file       Functional Status:  Prior to admission patient needed assistance:   Dependent ADLs:: Bathing, Dressing, Transfers (Staff will help Pt with SBA-Ax1 with cares if needed)  Dependent IADLs:: Cleaning, Cooking, Laundry, Shopping, Meal Preparation, Transportation  Assesssment of Functional Status: Not at baseline with mobility    Mental Health Status:          Chemical Dependency Status:            "     Values/Beliefs:  Spiritual, Cultural Beliefs, Synagogue Practices, Values that affect care: no               Discussed  Partnership in Safe Discharge Planning  document with patient/family: Yes: Writer did not have document but spoke with Pt about safe discharge    Additional Information:  CM team called (facility name) [facility] group home, phone [phone] and Spoke with Rafat who provided the following information:     In what kind of facility does pt reside?  Facility Type: Group Home - \"home discharge\"    Name of building/unit: NA   Any steps to get in (#)? There is a ramp to get into the house                2.   Best number to reach facility nursing? Phone [phone]  Fax:  [phone]         Evening/weekend number? same     3.  What is the preferred return time for the resident?  anytime  Can patient return on weekend? yes       Do you know how they typically transport? Wheelchair. Sometimes the  will provide transportation.  would like CM staff to set up transportation back to .     4.   Does facility manage medications?    orders the medications but Pt manages them herself. contracted pharmacy? Name:  [facility]         If new Rx meds at discharge, fill at hospital pharmacy or contracted pharmacy?   [facility]     5.   What is pt's baseline cognition and mobility? A&Ox4, SBA         What level of cognition/mobility is required for safe return? Same     6.  Is resident enrolled in any \"services?\"  yes  If so, what services:  Pt gets help with meals, housekeeping, laundry, SBA with bathing and dressing.       (ask about: meals, physical assist with ADLs, med management, housekeeping, and if they have built in or wearable call       buttons)    7.  Are \"skilled home health\" or \"on-site outpatient therapy services\" available there? No  name/agency, if        known: Pt can have any HHC if needed.    8.   Is the resident seen by PCP: off site   Name: Tobi Mahoney     9.   Does pt have any " personal DME (describe)? Yes, walker, glucometer         (ex; grab bars, shower chair, O2, glucometer, etc.)    Writer called  and spoke with Rafat. Rafat noted that they are trying to work with the Pt's PCP for the Pt to get on a GLP1 medication, as the Pt has gain a significant amount of weight and the Pt has decreased the time she is out of bed and moving. Rafat also is concerned because the Pt will eat 5x day with sugary drinks. Rafat also had concern that the Pt may be using cocaine at times, as the Pt's significant other has mentioned it to the staff, but the staff has not witnessed it themselves. (Writer informed the hosptialist of this information, Pt was admitted because of heart arrhythmias and possible upcoming cardiac procedures). Rafat also informed Writer that the Pt is planning on discharging from their  and moving to another June 1st. Rafat did state that there is paperwork still processing and that the Pt would be able to return to their  even after June 1st.   CM would have to set up transportation back to     Next Steps: follow for discharge needs.        Shawn Ragsdale, RN, BSN, Care Coordinator

## 2025-05-23 NOTE — PLAN OF CARE
"Goal Outcome Evaluation:  Neuro- x4   Most Recent Vitals- BP (!) 137/93   Pulse 77   Temp 98.6  F (37  C) (Oral)   Resp 21   Ht 1.626 m (5' 4\")   Wt (!) 145.9 kg (321 lb 9.6 oz)   SpO2 94%   BMI 55.20 kg/m    Tele/Cardiac- SR w/ PAC's. Few short bursts   Resp- RA, WISE   Activity- 1A gb walker   Pain- chronic lower extermity pain - prn oxy x1 and L hand tenderness   Drips- heparin infusing at 1350 units/hr. Magnesium x1   GI/- Wdl except external catheter in place   Diet: Regular Diet Adult    Plan- Monitor rate/rhythm, ECHO and cardiology consult today. Pt transferred to bariatric bed overnight for comfort.           "

## 2025-05-24 LAB
ANION GAP SERPL CALCULATED.3IONS-SCNC: 11 MMOL/L (ref 7–15)
ATRIAL RATE - MUSE: 94 BPM
BUN SERPL-MCNC: 18.4 MG/DL (ref 8–23)
CALCIUM SERPL-MCNC: 8.7 MG/DL (ref 8.8–10.4)
CHLORIDE SERPL-SCNC: 98 MMOL/L (ref 98–107)
CREAT SERPL-MCNC: 0.83 MG/DL (ref 0.51–0.95)
DIASTOLIC BLOOD PRESSURE - MUSE: NORMAL MMHG
EGFRCR SERPLBLD CKD-EPI 2021: 80 ML/MIN/1.73M2
GLUCOSE SERPL-MCNC: 112 MG/DL (ref 70–99)
HCO3 SERPL-SCNC: 32 MMOL/L (ref 22–29)
HGB BLD-MCNC: 8.1 G/DL (ref 11.7–15.7)
INTERPRETATION ECG - MUSE: NORMAL
MCV RBC AUTO: 71 FL (ref 78–100)
P AXIS - MUSE: NORMAL DEGREES
POTASSIUM SERPL-SCNC: 3.5 MMOL/L (ref 3.4–5.3)
PR INTERVAL - MUSE: NORMAL MS
QRS DURATION - MUSE: 100 MS
QT - MUSE: 302 MS
QTC - MUSE: 494 MS
R AXIS - MUSE: -35 DEGREES
SODIUM SERPL-SCNC: 141 MMOL/L (ref 135–145)
SYSTOLIC BLOOD PRESSURE - MUSE: NORMAL MMHG
T AXIS - MUSE: 83 DEGREES
UFH PPP CHRO-ACNC: 0.3 IU/ML (ref ?–1.1)
VENTRICULAR RATE- MUSE: 161 BPM

## 2025-05-24 PROCEDURE — 250N000011 HC RX IP 250 OP 636

## 2025-05-24 PROCEDURE — 250N000011 HC RX IP 250 OP 636: Performed by: INTERNAL MEDICINE

## 2025-05-24 PROCEDURE — 999N000128 HC STATISTIC PERIPHERAL IV START W/O US GUIDANCE

## 2025-05-24 PROCEDURE — 250N000011 HC RX IP 250 OP 636: Performed by: HOSPITALIST

## 2025-05-24 PROCEDURE — 250N000009 HC RX 250: Performed by: INTERNAL MEDICINE

## 2025-05-24 PROCEDURE — 85018 HEMOGLOBIN: CPT | Performed by: HOSPITALIST

## 2025-05-24 PROCEDURE — 82310 ASSAY OF CALCIUM: CPT | Performed by: HOSPITALIST

## 2025-05-24 PROCEDURE — 258N000003 HC RX IP 258 OP 636: Performed by: HOSPITALIST

## 2025-05-24 PROCEDURE — 999N000040 HC STATISTIC CONSULT NO CHARGE VASC ACCESS

## 2025-05-24 PROCEDURE — 250N000013 HC RX MED GY IP 250 OP 250 PS 637

## 2025-05-24 PROCEDURE — 99233 SBSQ HOSP IP/OBS HIGH 50: CPT | Performed by: INTERNAL MEDICINE

## 2025-05-24 PROCEDURE — 250N000013 HC RX MED GY IP 250 OP 250 PS 637: Performed by: HOSPITALIST

## 2025-05-24 PROCEDURE — 258N000003 HC RX IP 258 OP 636: Performed by: INTERNAL MEDICINE

## 2025-05-24 PROCEDURE — 99233 SBSQ HOSP IP/OBS HIGH 50: CPT | Performed by: HOSPITALIST

## 2025-05-24 PROCEDURE — 210N000001 HC R&B IMCU HEART CARE

## 2025-05-24 PROCEDURE — 36415 COLL VENOUS BLD VENIPUNCTURE: CPT | Performed by: HOSPITALIST

## 2025-05-24 PROCEDURE — 250N000013 HC RX MED GY IP 250 OP 250 PS 637: Performed by: INTERNAL MEDICINE

## 2025-05-24 PROCEDURE — 85520 HEPARIN ASSAY: CPT | Performed by: HOSPITALIST

## 2025-05-24 RX ORDER — METOPROLOL TARTRATE 1 MG/ML
5 INJECTION, SOLUTION INTRAVENOUS EVERY 6 HOURS
Status: DISCONTINUED | OUTPATIENT
Start: 2025-05-24 | End: 2025-05-25

## 2025-05-24 RX ORDER — METOPROLOL TARTRATE 1 MG/ML
5 INJECTION, SOLUTION INTRAVENOUS EVERY 4 HOURS PRN
Status: DISCONTINUED | OUTPATIENT
Start: 2025-05-24 | End: 2025-05-24

## 2025-05-24 RX ORDER — DIGOXIN 0.25 MG/ML
250 INJECTION INTRAMUSCULAR; INTRAVENOUS ONCE
Status: COMPLETED | OUTPATIENT
Start: 2025-05-24 | End: 2025-05-24

## 2025-05-24 RX ORDER — ALPRAZOLAM 0.25 MG
0.25 TABLET ORAL 3 TIMES DAILY PRN
Status: DISCONTINUED | OUTPATIENT
Start: 2025-05-24 | End: 2025-06-02 | Stop reason: HOSPADM

## 2025-05-24 RX ADMIN — METOPROLOL TARTRATE 5 MG: 5 INJECTION INTRAVENOUS at 11:09

## 2025-05-24 RX ADMIN — FUROSEMIDE 10 MG/HR: 10 INJECTION, SOLUTION INTRAVENOUS at 09:26

## 2025-05-24 RX ADMIN — OXYCODONE HYDROCHLORIDE 5 MG: 5 TABLET ORAL at 11:56

## 2025-05-24 RX ADMIN — AMIODARONE HYDROCHLORIDE 0.5 MG/MIN: 1.8 INJECTION, SOLUTION INTRAVENOUS at 21:13

## 2025-05-24 RX ADMIN — FUROSEMIDE 10 MG/HR: 10 INJECTION, SOLUTION INTRAVENOUS at 21:15

## 2025-05-24 RX ADMIN — METOPROLOL TARTRATE 5 MG: 5 INJECTION INTRAVENOUS at 23:08

## 2025-05-24 RX ADMIN — DIGOXIN 250 MCG: 0.25 INJECTION INTRAMUSCULAR; INTRAVENOUS at 16:09

## 2025-05-24 RX ADMIN — IRON SUCROSE 300 MG: 20 INJECTION, SOLUTION INTRAVENOUS at 10:52

## 2025-05-24 RX ADMIN — METOPROLOL TARTRATE 25 MG: 25 TABLET, FILM COATED ORAL at 07:44

## 2025-05-24 RX ADMIN — OXYCODONE HYDROCHLORIDE 5 MG: 5 TABLET ORAL at 01:57

## 2025-05-24 RX ADMIN — METOPROLOL TARTRATE 25 MG: 25 TABLET, FILM COATED ORAL at 20:15

## 2025-05-24 RX ADMIN — DOXYCYCLINE HYCLATE 100 MG: 100 CAPSULE ORAL at 20:15

## 2025-05-24 RX ADMIN — AMIODARONE HYDROCHLORIDE 0.5 MG/MIN: 1.8 INJECTION, SOLUTION INTRAVENOUS at 09:20

## 2025-05-24 RX ADMIN — AMIODARONE HYDROCHLORIDE 150 MG: 1.5 INJECTION, SOLUTION INTRAVENOUS at 01:38

## 2025-05-24 RX ADMIN — DOXYCYCLINE HYCLATE 100 MG: 100 CAPSULE ORAL at 07:44

## 2025-05-24 RX ADMIN — OXYCODONE HYDROCHLORIDE 5 MG: 5 TABLET ORAL at 23:05

## 2025-05-24 RX ADMIN — PANTOPRAZOLE SODIUM 40 MG: 40 INJECTION, POWDER, FOR SOLUTION INTRAVENOUS at 20:16

## 2025-05-24 RX ADMIN — METOPROLOL TARTRATE 5 MG: 5 INJECTION INTRAVENOUS at 17:58

## 2025-05-24 RX ADMIN — AMIODARONE HYDROCHLORIDE 1 MG/MIN: 1.8 INJECTION, SOLUTION INTRAVENOUS at 01:49

## 2025-05-24 RX ADMIN — HEPARIN SODIUM 1500 UNITS/HR: 10000 INJECTION, SOLUTION INTRAVENOUS at 17:57

## 2025-05-24 RX ADMIN — PANTOPRAZOLE SODIUM 40 MG: 40 INJECTION, POWDER, FOR SOLUTION INTRAVENOUS at 07:45

## 2025-05-24 RX ADMIN — METOPROLOL TARTRATE 5 MG: 5 INJECTION INTRAVENOUS at 00:45

## 2025-05-24 RX ADMIN — CYANOCOBALAMIN TAB 500 MCG 500 MCG: 500 TAB at 07:43

## 2025-05-24 RX ADMIN — LAMOTRIGINE 100 MG: 100 TABLET ORAL at 07:43

## 2025-05-24 ASSESSMENT — ACTIVITIES OF DAILY LIVING (ADL)
ADLS_ACUITY_SCORE: 68
ADLS_ACUITY_SCORE: 47
ADLS_ACUITY_SCORE: 68
ADLS_ACUITY_SCORE: 64
ADLS_ACUITY_SCORE: 68
ADLS_ACUITY_SCORE: 47
ADLS_ACUITY_SCORE: 68
ADLS_ACUITY_SCORE: 47
ADLS_ACUITY_SCORE: 68

## 2025-05-24 NOTE — PROVIDER NOTIFICATION
MD Notification    Notified Person: MD    Notified Person Name: Dr. Benavides     Notification Date/Time: 5/24 1545    Notification Interaction: provider at nursing desk     Purpose of Notification: HR remains 150-160's despite IV metoprolol    Orders Received: one time dose 250 mcg digoxin. EP consult Tuesday.     Comments:

## 2025-05-24 NOTE — PLAN OF CARE
"Patient is alert and oriented x4. VSS ex HR, on 2 L oxygen overnight for desaturation. Converted to Afib RVR around 2340. PRN metoprolol given, was not effective. Amio gtt started. Heparin and lasix gtt also infusing. BLE pain managed with prn oxycodone and tylenol x1 each. Purewick in place with adequate urine output. Refuses repositioning. Refused scheduled CT scan in the evening. Continue with plan of care.    Aggression Stop Light: Green      /80   Pulse (!) 152   Temp 98.2  F (36.8  C) (Oral)   Resp 20   Ht 1.626 m (5' 4\")   Wt (!) 137.8 kg (303 lb 12.8 oz)   SpO2 97%   BMI 52.15 kg/m                     "

## 2025-05-24 NOTE — PROGRESS NOTES
A&O x4. VSS on room air. Tele SR w/ PACs. Labored breathing while laying flat, wet cough present. Denies CP/pain. Up A1 but refused most activity today. Pt only wanted to sleep. Heparin and lasix drip infusing. Purewick in place. Continue to diurese.

## 2025-05-24 NOTE — PROVIDER NOTIFICATION
MD Notification    Notified Person: MD    Notified Person Name: Hector Westbrook    Notification Date/Time: 5/23/25, 2340    Notification Interaction: Vocera    Purpose of Notification: Pt in Afib RVR, HR up to 160s    Orders Received: No response    Comments:

## 2025-05-24 NOTE — PROGRESS NOTES
"GASTROENTEROLOGY PROGRESS NOTE     CC: Fluid overload     SUBJECTIVE:  No abdominal pain, nausea or vomiting, No black or bloody stools      OBJECTIVE:  General Appearance:  Obese female in hospital bed  BP (!) 113/103   Pulse (!) 154   Temp 97.9  F (36.6  C) (Oral)   Resp 18   Ht 1.626 m (5' 4\")   Wt (!) 137.8 kg (303 lb 12.8 oz)   SpO2 93%   BMI 52.15 kg/m    Temp (24hrs), Av.8  F (37.1  C), Min:97.9  F (36.6  C), Max:99.8  F (37.7  C)    Patient Vitals for the past 72 hrs:   Weight   25 0552 (!) 137.8 kg (303 lb 12.8 oz)   25 0300 (!) 145.9 kg (321 lb 9.6 oz)   25 1751 (!) 147.6 kg (325 lb 6.4 oz)       Intake/Output Summary (Last 24 hours) at 2025 0757  Last data filed at 2025 0735  Gross per 24 hour   Intake 1091.57 ml   Output 14750 ml   Net -26286.43 ml        PHYSICAL EXAM  General: alert, oriented, NAD  SKIN: no suspicious lesions, rashes, jaundice, or spider angiomas   EYES: No scleral icterus   RESPIRATORY: Some work of breathing noted  GASTROINTESTINAL: Abdomen soft and no tenderness on palpation.   NEURO:Grossly WNL.    Additional Comments:  ROS, FH, SH: See initial GI consult for details.          Recent Labs   Lab Test 25  0718 25  1854 25  0601 25  1743 05/15/24  194   WBC  --   --  9.1 9.6 6.7   HGB 8.1* 8.7* 7.8* 8.0* 11.8   MCV 71* 73* 74* 72* 89   PLT  --   --  249 275 234     Recent Labs   Lab Test 25  0718 25  0601 25  1743   POTASSIUM 3.5 4.2 3.9   CHLORIDE 98 104 106   CO2 32* 24 23   BUN 18.4 17.2 16.5   ANIONGAP 11 9 12     Recent Labs   Lab Test 25  1137 25  1743 05/15/24  1941 24  1301   ALBUMIN  --  3.7 4.0  --    BILITOTAL  --  0.2 0.2  --    ALT  --  12 28  --    AST  --  15 18  --    PROTEIN Negative  --   --  Negative         Imaging and procedures:    I have reviewed the patient's new clinical lab results    Problem list:  Compensated cirrhosis of liver from eradicated Hep C  ALEX  A " flutter  CHF  S/P gastric bypass  History of MRSA    Assessment: 60 year old female with a history of decompensated HF, A flutter s/p Cardioversion, tobacco use, compensated cirrhosis secondary to Hep C (treated), Obesity, and bipolar disorder. She has a past surgical history of gastric bypass, cholecystectomy and thyroidectomy. We were consulted for iron deficiency anemia.  Received a couple of doses of IV iron. No GI symptoms, denies signs of over GI bleeding.      Plan:  - On IV iron supplement  - Monitor of over signs of GI bleeding  - EGD on Tuesday or sooner if she develops signs of GI bleeding  - Follow-up in liver clinic  - On IV heparin  - On IV amiodarone   - On fluid restriction and diuretics   - Outpatient colonoscopy   - We will see her on Monday    I will discuss with Dr. Jeff       Time spent: 15 minutes, greater than 50% of the visit was spent in counseling/coordination of care.     Cristina Alvarado CNP  [facility] ([facility])  Mobile # [phone]

## 2025-05-24 NOTE — PROVIDER NOTIFICATION
MD Notification    Notified Person: MD    Notified Person Name: Aaron Cowart    Notification Date/Time: 05/24/25, 0016    Notification Interaction: Vocera    Purpose of Notification: Patient is in Afib RVR, HR sustaining in 150s.     Orders Received: See MAR    Comments:

## 2025-05-24 NOTE — PROGRESS NOTES
X-cover    Pt in rapid afib w/ rates 140-150s. Had same in ED and underwent DCCV to NSR. Has decreased EF so will try to avoid CCB  - prn metoprolol for now, if not effective will start amiodarone    Aaron Cowart MD

## 2025-05-24 NOTE — PROGRESS NOTES
"Cardiology Progress Note  Marcello Benavides MD         Assessment and Plan:        Acute on chronic congestive heart failure with reduced ejection fraction, echo reveals EF of 35%.  Paroxysmal atrial flutter/fibrillation with rapid ventricular rate on amiodarone IV, previous cardioversion in the emergency room but now recurred  Marked anemia and liver cirrhosis with previous history of hepatitis C, treated    Discussion  Patient continues to be in fluid overload and will continue IV Lasix drip.  Admission weight was 325 pounds and now 303 pounds.  Creatinine normal.  Continues to have rapid ventricular rate with atrial flutter.  Continue IV amiodarone for now.  Also oral metoprolol.  Will add IV metoprolol as needed depending on heart rate control and blood pressure.  Will likely need EP consult to consider ablation  GI evaluation pending    At today's visit, I reviewed the echo images, EKGs myself.  Today's medical decision making is of high complexity.                     Interval History:     Shortness of breath and fluid overload          Review of Systems:     The 5 point Review of Systems is negative other than noted in the HPI          Physical Exam:        Blood pressure 92/79, pulse (!) 152, temperature 98.1  F (36.7  C), temperature source Oral, resp. rate 18, height 1.626 m (5' 4\"), weight (!) 137.8 kg (303 lb 12.8 oz), SpO2 93%.  Vitals:    05/22/25 1751 05/23/25 0300 05/24/25 0552   Weight: (!) 147.6 kg (325 lb 6.4 oz) (!) 145.9 kg (321 lb 9.6 oz) (!) 137.8 kg (303 lb 12.8 oz)     Weights since admission  Vitals:    05/22/25 1751 05/23/25 0300 05/24/25 0552   Weight: (!) 147.6 kg (325 lb 6.4 oz) (!) 145.9 kg (321 lb 9.6 oz) (!) 137.8 kg (303 lb 12.8 oz)     Vital Signs with Ranges  Temp:  [97.9  F (36.6  C)-99.8  F (37.7  C)] 98.1  F (36.7  C)  Pulse:  [] 152  Resp:  [18-24] 18  BP: ()/() 92/79  SpO2:  [88 %-97 %] 93 %  I/O's Last 24 hours  I/O last 3 completed shifts:  In: 1331.57 " [P.O.:820; I.V.:221.57; IV Piggyback:290]  Out: 90178 [Urine:86728]  Net I/O since admission  05/19 0700 - 05/24 0659  In: 1881.57 [P.O.:1370; I.V.:221.57]  Out: 50567 [Urine:08662]  Net: -9318.43    EXAM:    Tachycardic, 2+ leg edema, decreased air entry at the bases.         Medications:        Current Facility-Administered Medications   Medication Dose Route Frequency Provider Last Rate Last Admin    cyanocobalamin (VITAMIN B-12) tablet 500 mcg  500 mcg Oral Daily Loki Anderson MD   500 mcg at 05/24/25 0743    doxycycline hyclate (VIBRAMYCIN) capsule 100 mg  100 mg Oral BID Loco Murray MD   100 mg at 05/24/25 0744    iron sucrose (VENOFER) 300 mg in sodium chloride 0.9 % 290 mL intermittent infusion  300 mg Intravenous Daily Loki Anderson .3 mL/hr at 05/24/25 1052 300 mg at 05/24/25 1052    lamoTRIgine (LaMICtal) tablet 100 mg  100 mg Oral Daily Loco Murray MD   100 mg at 05/24/25 0743    metoprolol tartrate (LOPRESSOR) tablet 25 mg  25 mg Oral BID Dillan Cardoza MD   25 mg at 05/24/25 0744    nicotine (NICODERM CQ) 7 MG/24HR 24 hr patch 1 patch  1 patch Transdermal Daily Loki Anderson MD        pantoprazole (PROTONIX) IV push injection 40 mg  40 mg Intravenous BID Loki Anderson MD   40 mg at 05/24/25 0745    sodium chloride (PF) 0.9% PF flush 3 mL  3 mL Intracatheter Q8H [facility] Loco Murray MD   3 mL at 05/23/25 2131            Data:      All new lab and imaging data was reviewed.   Recent Labs   Lab Test 05/24/25  0718 05/23/25  1854 05/23/25  0601 05/22/25  1743 05/15/24  1941   WBC  --   --  9.1 9.6 6.7   HGB 8.1* 8.7* 7.8* 8.0* 11.8   MCV 71* 73* 74* 72* 89   PLT  --   --  249 275 234      Recent Labs   Lab Test 05/24/25  0718 05/23/25  0601 05/22/25  1743    137 141   POTASSIUM 3.5 4.2 3.9   CHLORIDE 98 104 106   CO2 32* 24 23   BUN 18.4 17.2 16.5   CR 0.83 0.64 0.62   ANIONGAP 11 9 12   KODI 8.7* 9.1 9.2   * 106* 110*     No lab results found.    Invalid input(s):  "\"TROP\", \"TROPONINIES\"           Imaging:   Recent Results (from the past 24 hours)   Echocardiogram Complete   Result Value    LVEF  35%    [facility]    [id number]  [id number]  [id number]  [id number]^DORIAN^JOHANNA     [facility]  Echocardiography Laboratory  [address] [place] [address]     Name: FLAKITO MCCOY  MRN: [id number]  : 1965  Study Date: 2025 11:23 AM  Age: 60 yrs  Gender: Female  Patient Location: [facility]  Reason For Study: Atrial Flutter  Ordering Physician: JOHANNA ALARCON  Referring Physician: JOHANNA ALARCON  Performed By: Emperatriz Burt     BSA: 2.4 m2  Height: 64 in  Weight: 321 lb  HR: 74  BP: 139/77 mmHg  ______________________________________________________________________________  Procedure  Echocardiogram with two-dimensional, color and spectral Doppler.  ______________________________________________________________________________  Interpretation Summary     1. The left ventricle is moderately dilated. The visual ejection fraction is  estimated at 35%. There is moderate global hypokinesia of the left ventricle.  2. The right ventricle is mildly dilated. Mildly decreased right ventricular  systolic function  3. There is mild to moderate (1-2+) mitral regurgitation.  4. There is moderate (2+) tricuspid regurgitation.  5. Mild (35-45mmHg) pulmonary hypertension is present. The right ventricular  systolic pressure is approximated at 41mmHg plus the right atrial pressure.     Echo 2024 from [facility] reported EF 55%.  ______________________________________________________________________________  Left Ventricle  The left ventricle is moderately dilated. The visual ejection fraction is  estimated at 35%. Left ventricular diastolic function is indeterminate. There  is moderate global hypokinesia of the left ventricle.     Right Ventricle  The right ventricle is mildly dilated. Mildly decreased right ventricular  systolic function.     Atria  The left atrium is severely " dilated. The right atrium is moderately dilated.  There is no atrial shunt seen.     Mitral Valve  There is mild to moderate (1-2+) mitral regurgitation. Mitral mean 5mmHg, some  restriction of leaflet opening.     Tricuspid Valve  There is moderate (2+) tricuspid regurgitation. Mild (35-45mmHg) pulmonary  hypertension is present. The right ventricular systolic pressure is  approximated at 41mmHg plus the right atrial pressure.     Aortic Valve  The aortic valve is normal in structure and function.     Pulmonic Valve  The pulmonic valve is normal in structure and function.     Vessels  Normal ascending, transverse (arch), and descending aorta. Dilation of the  inferior vena cava is present with abnormal respiratory variation in diameter.     Pericardium  There is no pericardial effusion.     Rhythm  Sinus rhythm was noted.  ______________________________________________________________________________  MMode/2D Measurements & Calculations  IVSd: 1.3 cm     LVIDd: 5.8 cm  LVIDs: 4.1 cm  LVPWd: 0.96 cm  FS: 28.8 %  LV mass(C)d: 269.2 grams  LV mass(C)dI: 112.6 grams/m2  Ao root diam: 3.1 cm  asc Aorta Diam: 3.5 cm  LVOT diam: 1.9 cm  LVOT area: 2.9 cm2  Ao root diam index Ht(cm/m): 1.9  Ao root diam index BSA (cm/m2): 1.3  Asc Ao diam index BSA (cm/m2): 1.5  Asc Ao diam index Ht(cm/m): 2.2  EF Biplane: 34.7 %  LA Volume (BP): 163.0 ml     LA Volume Index (BP): 68.2 ml/m2  RV Base: 4.2 cm  RWT: 0.33  TAPSE: 1.7 cm     Doppler Measurements & Calculations  MV E max jose luis: 130.0 cm/sec  MV max P.9 mmHg  MV mean P.0 mmHg  MV V2 VTI: 41.7 cm  MVA(VTI): 1.3 cm2  MV dec time: 0.25 sec  Ao V2 max: 205.1 cm/sec  Ao max P.0 mmHg  Ao V2 mean: 138.8 cm/sec  Ao mean P.4 mmHg  Ao V2 VTI: 36.2 cm  GINA(I,D): 1.5 cm2  GINA(V,D): 1.3 cm2  LV V1 max PG: 3.6 mmHg  LV V1 max: 94.3 cm/sec  LV V1 VTI: 18.9 cm  SV(LVOT): 55.5 ml  SI(LVOT): 23.2 ml/m2  PA V2 max: 85.7 cm/sec  PA max P.9 mmHg  PA acc time: 0.10 sec  TR max  yaya: 322.1 cm/sec  TR max P.5 mmHg     AV Yaya Ratio (DI): 0.46  GINA Index (cm2/m2): 0.64  E/E' avg: 15.7  Lateral E/e': 15.1  Medial E/e': 16.4     ______________________________________________________________________________  Report approved by: Jakob Wahl MD on 2025 12:26 PM

## 2025-05-24 NOTE — PROGRESS NOTES
[facility]  Hospitalist Progress Note   05/24/2025          Assessment and Plan:       Omayra Malone is a 60 year old female with history of hypertension, hepatitis C, liver cirrhosis, chronic anemia, obesity status post gastric bypass, bipolar affective disorder, MEREDITH, hypothyroidism, osteoarthritis admitted on 5/22/2025 for shortness of breath, palpitations.    Shortness of breath multifactorial from heart failure exacerbation, atrial flutter,  Anemia, obstructive sleep apnea, OHS, deconditioning.  Acute heart failure with reduced EF of 35%, decompensated  Atrial flutter with RVR-status post DCCV on 5/22, flipped to normal sinus rhythm, now in A-fib  Moderate tricuspid regurgitation.  Mild pulmonary hypertension.  Mild to moderate MR.  Hypertension.  - Patient resident of [facility],  presented with shortness of breath and palpitations.  Patient initially denied drug use,  urine positive for cocaine.  Reports recent use.  --On EMS arrival the patient was found to be in SVT at a pulse of 176. En route to the ED they attempted 6mg Adenosine, followed shortly after by another 12 mg with no success. Her SpO2 was 94% en route to the ED. in ED heart rate in 170s, blood pressure in 120s systolic.  Sodium 141, potassium 3.9, creatinine 0.62.  -WBC 9.6, TSH 2.06.  N-terminal proBNP 1648.  Troponin high-sensitivity 12 >1  -Chest x-ray no definitive evidence for pulmonary edema, no concerns for infection.  -EKG sinus tachycardia, left axis deviation, ST-T wave changes.  -Echocardiogram with LVEF of 35%, moderate global hypokinesis of the left ventricle.  Right ventricle  mildly dilated.  Moderate mitral regurgitation, moderate tricuspid regurgitation.  Mild pulmonary hypertension.  -- 5/23 - was on intravenous heparin drip, initiated IV Lasix drip for diuresis.  -5/24 early a.m.patient in A-fib with rates in 140s to 150s.  No palpitations, ongoing shortness of breath.  --Continue intravenous amiodarone  drip.  Continue intravenous heparin drip.  Continue intravenous Lasix drip.  Continue scheduled oral metoprolol 25 mg twice daily.  IV metoprolol per cardiology.  Cardiology following, appreciate input.  Hold prior to admission amlodipine, hydrochlorothiazide to allow room for diuresis, beta-blocker.    Continue telemetry monitoring.  Keep potassium around 4, magnesium around 2.  Intake output monitoring, daily weights.  2000 mL fluid restriction.     Acute on chronic anemia -likely dilutional, rule out blood loss.  Microcytic anemia, severe iron deficiency.  Vitamin B12 deficiency.  Hemoglobin 8.0 from previous 11.8 1-year ago, patient denies bleeding or melena. Does have history of gastric bypass does not appear to be on B12.    -Iron saturation index 4, vitamin B12 150.  CK29.  Folate 16.7.  UA no hematuria.  -IV Venofer for 2 doses -5/23, 5/24.  Will start on oral iron supplements on discharge.  Will start on vitamin B12 replacement.  Consent for blood transfusion obtained, transfuse for hemoglobin less than 8 or symptomatic.    Continue IV pantoprazole.  Gastroenterology following, no plans for endoscopy today unless active bleeding.  Appreciate input.  Will consider CT imaging if any active bleeding.    Cirrhosis secondary to Hep C and NAFLD  Noted- per chart review, [facility] 2021 well-compensated cirrhosis due to Hep C (treated) and NAFLD.   IV pantoprazole as above.    Left hand pain  Sudden pain while holding something, no known MOA, appears swollen and painful.   Afebrile, no leukocytosis CRP 4.74, CK20 9.  Cold compress.  Monitor closely, imaging if spikes fever.     Bipolar affective disorder  Anxiety  - continue PTA lamictal 100mg daily  - Continue PTA xanax PRN at reduce dose (does not take when she is prescribed oxycodone which she currently is for leg pain)     Chronic leg and back pain   Follows with [facility] pain clinic, chronically on oxycodone.  - Continue PTA oxycodone 5mg q8prn, minimize  "narcotic use as able to.     History of hypothyroidism.  PTA not on meds.  TSH within normal limits     Medically complicated obesity s/p gastric bypass  Body mass index is 55.85 kg/m . Recommend outpatient follow up/ongoing weight loss.      MEREDITH  Does not use CPAP  Will need to consider CPAP.     Chronic doxycycline use  No clear indication on chart review, reports chronically used.  - continue for now     Physical deconditioning from medical illness.  PT, OT evaluation prior to discharge.  Fall precautions.  Encourage ambulation out of bed as able to.    Nicotine use.  Reports intermittent vaping.  Emphasized need to consider abstinence, nicotine patch ordered.    Cocaine use.  Patient initially denied drug use, group home staff reported concern for cocaine use.  Urine drug screen positive for cocaine.  Patient reports recent use with a friend.  Emphasized need to consider abstinence, will need chemical dependency evaluation once closer to discharge    Goals of care discussion.  Patient critically ill with ongoing atrial fibrillation, hypoxic respiratory failure, heart failure.  At length discussed with patient, full code at this time.     Clinically Significant Risk Factors     # Morbid Obesity: Estimated body mass index is 52.15 kg/m  as calculated from the following:    Height as of this encounter: 1.626 m (5' 4\").    Weight as of this encounter: 137.8 kg (303 lb 12.8 oz)., PRESENT ON ADMISSION     # Financial/Environmental Concerns: none      Orders Placed This Encounter      Low Saturated Fat Na <2400 mg      DVT Prophylaxis: SCDs, on intravenous heparin  Code Status: Full Code  Disposition: Expected discharge in greater than 2 days.    Medically Ready for Discharge: Anticipated in 2-4 Days     Discussed with patient, bedside RN.  Patient critically ill with ongoing medical issues as discussed in note. More than 70% of time spent in direct patient care, care coordination, patient counseling, and formalizing " plan of care.        Loki Anderson MD        Interval History:        Patient lying in bed.    Denies any chest pain.  Reporting shortness of breath on rest.  Has not ambulated out of bed.  Complaining of left hand pain, receiving oxycodone.  Denies any palpitations.  Denies any abdominal pain.  No nausea or vomiting.   Had breakfast this morning.  Denies any blood in the stools or blood in the urine.  Flipped into A-fib.  On intravenous heparin drip.  On intravenous amiodarone drip.  On intravenous Lasix to         Physical Exam:        Physical Exam   Temp:  [97.9  F (36.6  C)-99.8  F (37.7  C)] 98.1  F (36.7  C)  Pulse:  [] 152  Resp:  [18-24] 18  BP: ()/() 92/79  SpO2:  [88 %-97 %] 93 %    Intake/Output Summary (Last 24 hours) at 5/23/2025 1419  Last data filed at 5/23/2025 1326  Gross per 24 hour   Intake 1030 ml   Output 2900 ml   Net -1870 ml       Admission Weight: (!) 147.6 kg (325 lb 6.4 oz)  Current Weight: (!) 145.9 kg (321 lb 9.6 oz)    PHYSICAL EXAM  GENERAL: Patient is in no distress.  Drowsy but arousable.  HEENT: Oropharynx pink  HEART: Regular rate and rhythm. S1S2.   LUNGS: Bilateral decreased breath sounds, technically difficult due to body habitus.  Respirations unlabored  ABDOMEN: Soft, distended, no abdominal tenderness, bowel sounds heard   NEURO: Moving all extremities.  Left thumb area some restricted range of motion.  No erythema, no tenderness.  EXTREMITIES: ++ pedal edema.   SKIN: Warm, dry. No rash   PSYCHIATRY Cooperative       Medications:        Current Facility-Administered Medications   Medication Dose Route Frequency Provider Last Rate Last Admin    cyanocobalamin (VITAMIN B-12) tablet 500 mcg  500 mcg Oral Daily Loki Anderson MD   500 mcg at 05/24/25 0743    doxycycline hyclate (VIBRAMYCIN) capsule 100 mg  100 mg Oral BID Loco Murray MD   100 mg at 05/24/25 0744    iron sucrose (VENOFER) 300 mg in sodium chloride 0.9 % 290 mL intermittent infusion   300 mg Intravenous Daily Loki Anderson .3 mL/hr at 05/24/25 1052 300 mg at 05/24/25 1052    lamoTRIgine (LaMICtal) tablet 100 mg  100 mg Oral Daily Loco Murray MD   100 mg at 05/24/25 0743    metoprolol (LOPRESSOR) injection 5 mg  5 mg Intravenous Q6H Marcello Benavides MD        metoprolol tartrate (LOPRESSOR) tablet 25 mg  25 mg Oral BID Dillan Cardoza MD   25 mg at 05/24/25 0744    nicotine (NICODERM CQ) 7 MG/24HR 24 hr patch 1 patch  1 patch Transdermal Daily Loki Anderson MD        pantoprazole (PROTONIX) IV push injection 40 mg  40 mg Intravenous BID Loki Anderson MD   40 mg at 05/24/25 0745    sodium chloride (PF) 0.9% PF flush 3 mL  3 mL Intracatheter Q8H [facility] Loco Murray MD   3 mL at 05/23/25 2131     Current Facility-Administered Medications   Medication Dose Route Frequency Provider Last Rate Last Admin    acetaminophen (TYLENOL) tablet 975 mg  975 mg Oral Q6H PRN Loco Murray MD   975 mg at 05/23/25 2055    ALPRAZolam (XANAX) tablet 1 mg  1 mg Oral Daily PRN Loco Murray MD        calcium carbonate (TUMS) chewable tablet 1,000 mg  1,000 mg Oral 4x Daily PRN Loco Murray MD        lidocaine (LMX4) cream   Topical Q1H PRN Loco Murray MD        lidocaine 1 % 0.1-1 mL  0.1-1 mL Other Q1H PRN Loco Murray MD        melatonin tablet 5 mg  5 mg Oral At Bedtime PRN Loco Murray MD        miconazole (MICATIN) 2 % powder   Topical Daily PRN Loco Murray MD   Given at 05/23/25 1847    naloxone (NARCAN) injection 0.2 mg  0.2 mg Intravenous Q2 Min PRN Loco Murray MD        Or    naloxone (NARCAN) injection 0.4 mg  0.4 mg Intravenous Q2 Min PRN Loco Murray MD        Or    naloxone (NARCAN) injection 0.2 mg  0.2 mg Intramuscular Q2 Min PRN Loco Murray MD        Or    naloxone (NARCAN) injection 0.4 mg  0.4 mg Intramuscular Q2 Min PRN Loco Murray MD        oxyCODONE (ROXICODONE) tablet 5 mg  5 mg Oral Q8H PRN Loco Murray MD   5 mg at 05/24/25 0157    Patient is  already receiving anticoagulation with heparin, enoxaparin (LOVENOX), warfarin (COUMADIN)  or other anticoagulant medication   Does not apply Continuous PRN Loco Murray MD        senna-docusate (SENOKOT-S/PERICOLACE) 8.6-50 MG per tablet 1 tablet  1 tablet Oral BID PRN Loco Murray MD        Or    senna-docusate (SENOKOT-S/PERICOLACE) 8.6-50 MG per tablet 2 tablet  2 tablet Oral BID PRN Loco Murray MD        sodium chloride (PF) 0.9% PF flush 3 mL  3 mL Intracatheter q1 min prn Loco Murray MD   3 mL at 05/24/25 0780            Data:      All new lab and imaging data was reviewed.

## 2025-05-25 ENCOUNTER — APPOINTMENT (OUTPATIENT)
Dept: GENERAL RADIOLOGY | Facility: CLINIC | Age: 60
DRG: 291 | End: 2025-05-25
Attending: HOSPITALIST
Payer: COMMERCIAL

## 2025-05-25 LAB
ANION GAP SERPL CALCULATED.3IONS-SCNC: 13 MMOL/L (ref 7–15)
BUN SERPL-MCNC: 17.5 MG/DL (ref 8–23)
CALCIUM SERPL-MCNC: 8.6 MG/DL (ref 8.8–10.4)
CHLORIDE SERPL-SCNC: 93 MMOL/L (ref 98–107)
CREAT SERPL-MCNC: 0.81 MG/DL (ref 0.51–0.95)
EGFRCR SERPLBLD CKD-EPI 2021: 82 ML/MIN/1.73M2
GLUCOSE SERPL-MCNC: 121 MG/DL (ref 70–99)
HCO3 SERPL-SCNC: 32 MMOL/L (ref 22–29)
HGB BLD-MCNC: 8.8 G/DL (ref 11.7–15.7)
MAGNESIUM SERPL-MCNC: 1.4 MG/DL (ref 1.7–2.3)
MAGNESIUM SERPL-MCNC: 2.4 MG/DL (ref 1.7–2.3)
MCV RBC AUTO: 71 FL (ref 78–100)
POTASSIUM SERPL-SCNC: 3.3 MMOL/L (ref 3.4–5.3)
POTASSIUM SERPL-SCNC: 3.4 MMOL/L (ref 3.4–5.3)
SODIUM SERPL-SCNC: 138 MMOL/L (ref 135–145)
UFH PPP CHRO-ACNC: 0.2 IU/ML (ref ?–1.1)
UFH PPP CHRO-ACNC: 0.36 IU/ML (ref ?–1.1)

## 2025-05-25 PROCEDURE — 83735 ASSAY OF MAGNESIUM: CPT | Performed by: HOSPITALIST

## 2025-05-25 PROCEDURE — 250N000009 HC RX 250: Performed by: INTERNAL MEDICINE

## 2025-05-25 PROCEDURE — 250N000013 HC RX MED GY IP 250 OP 250 PS 637: Performed by: INTERNAL MEDICINE

## 2025-05-25 PROCEDURE — 250N000013 HC RX MED GY IP 250 OP 250 PS 637: Performed by: HOSPITALIST

## 2025-05-25 PROCEDURE — 73130 X-RAY EXAM OF HAND: CPT | Mod: LT

## 2025-05-25 PROCEDURE — 84132 ASSAY OF SERUM POTASSIUM: CPT | Performed by: HOSPITALIST

## 2025-05-25 PROCEDURE — 250N000011 HC RX IP 250 OP 636

## 2025-05-25 PROCEDURE — 250N000011 HC RX IP 250 OP 636: Mod: JZ | Performed by: INTERNAL MEDICINE

## 2025-05-25 PROCEDURE — 999N000128 HC STATISTIC PERIPHERAL IV START W/O US GUIDANCE

## 2025-05-25 PROCEDURE — 210N000001 HC R&B IMCU HEART CARE

## 2025-05-25 PROCEDURE — 250N000013 HC RX MED GY IP 250 OP 250 PS 637

## 2025-05-25 PROCEDURE — 85520 HEPARIN ASSAY: CPT | Performed by: HOSPITALIST

## 2025-05-25 PROCEDURE — 80048 BASIC METABOLIC PNL TOTAL CA: CPT | Performed by: HOSPITALIST

## 2025-05-25 PROCEDURE — 99233 SBSQ HOSP IP/OBS HIGH 50: CPT | Performed by: INTERNAL MEDICINE

## 2025-05-25 PROCEDURE — 250N000011 HC RX IP 250 OP 636: Performed by: HOSPITALIST

## 2025-05-25 PROCEDURE — 258N000003 HC RX IP 258 OP 636: Performed by: INTERNAL MEDICINE

## 2025-05-25 PROCEDURE — 36415 COLL VENOUS BLD VENIPUNCTURE: CPT | Performed by: HOSPITALIST

## 2025-05-25 PROCEDURE — 250N000011 HC RX IP 250 OP 636: Performed by: INTERNAL MEDICINE

## 2025-05-25 PROCEDURE — 85018 HEMOGLOBIN: CPT | Performed by: HOSPITALIST

## 2025-05-25 PROCEDURE — 999N000040 HC STATISTIC CONSULT NO CHARGE VASC ACCESS

## 2025-05-25 PROCEDURE — 99233 SBSQ HOSP IP/OBS HIGH 50: CPT | Performed by: HOSPITALIST

## 2025-05-25 RX ORDER — POTASSIUM CHLORIDE 1500 MG/1
40 TABLET, EXTENDED RELEASE ORAL ONCE
Status: COMPLETED | OUTPATIENT
Start: 2025-05-25 | End: 2025-05-25

## 2025-05-25 RX ORDER — HYDROXYZINE HYDROCHLORIDE 25 MG/1
25 TABLET, FILM COATED ORAL 3 TIMES DAILY PRN
Status: DISCONTINUED | OUTPATIENT
Start: 2025-05-25 | End: 2025-06-02 | Stop reason: HOSPADM

## 2025-05-25 RX ORDER — METOPROLOL TARTRATE 1 MG/ML
2.5 INJECTION, SOLUTION INTRAVENOUS EVERY 4 HOURS PRN
Status: DISCONTINUED | OUTPATIENT
Start: 2025-05-25 | End: 2025-05-25

## 2025-05-25 RX ORDER — MAGNESIUM SULFATE HEPTAHYDRATE 40 MG/ML
4 INJECTION, SOLUTION INTRAVENOUS ONCE
Status: COMPLETED | OUTPATIENT
Start: 2025-05-25 | End: 2025-05-25

## 2025-05-25 RX ORDER — METOPROLOL TARTRATE 1 MG/ML
5 INJECTION, SOLUTION INTRAVENOUS EVERY 6 HOURS PRN
Status: DISCONTINUED | OUTPATIENT
Start: 2025-05-25 | End: 2025-06-02 | Stop reason: HOSPADM

## 2025-05-25 RX ADMIN — METOPROLOL TARTRATE 2.5 MG: 5 INJECTION INTRAVENOUS at 03:34

## 2025-05-25 RX ADMIN — OXYCODONE HYDROCHLORIDE 5 MG: 5 TABLET ORAL at 17:05

## 2025-05-25 RX ADMIN — LAMOTRIGINE 100 MG: 100 TABLET ORAL at 07:37

## 2025-05-25 RX ADMIN — POTASSIUM CHLORIDE 40 MEQ: 1500 TABLET, EXTENDED RELEASE ORAL at 18:43

## 2025-05-25 RX ADMIN — CYANOCOBALAMIN TAB 500 MCG 500 MCG: 500 TAB at 07:38

## 2025-05-25 RX ADMIN — METOPROLOL TARTRATE 5 MG: 5 INJECTION INTRAVENOUS at 21:49

## 2025-05-25 RX ADMIN — POTASSIUM CHLORIDE 40 MEQ: 1500 TABLET, EXTENDED RELEASE ORAL at 11:00

## 2025-05-25 RX ADMIN — MICONAZOLE NITRATE: 2 POWDER TOPICAL at 11:29

## 2025-05-25 RX ADMIN — PANTOPRAZOLE SODIUM 40 MG: 40 INJECTION, POWDER, FOR SOLUTION INTRAVENOUS at 20:18

## 2025-05-25 RX ADMIN — MAGNESIUM SULFATE HEPTAHYDRATE 4 G: 40 INJECTION, SOLUTION INTRAVENOUS at 12:26

## 2025-05-25 RX ADMIN — PANTOPRAZOLE SODIUM 40 MG: 40 INJECTION, POWDER, FOR SOLUTION INTRAVENOUS at 07:35

## 2025-05-25 RX ADMIN — ACETAMINOPHEN 975 MG: 325 TABLET, FILM COATED ORAL at 07:37

## 2025-05-25 RX ADMIN — HEPARIN SODIUM 1650 UNITS/HR: 10000 INJECTION, SOLUTION INTRAVENOUS at 07:28

## 2025-05-25 RX ADMIN — HEPARIN SODIUM 1650 UNITS/HR: 10000 INJECTION, SOLUTION INTRAVENOUS at 07:24

## 2025-05-25 RX ADMIN — DOXYCYCLINE HYCLATE 100 MG: 100 CAPSULE ORAL at 07:37

## 2025-05-25 RX ADMIN — HEPARIN SODIUM 1650 UNITS/HR: 10000 INJECTION, SOLUTION INTRAVENOUS at 21:48

## 2025-05-25 RX ADMIN — OXYCODONE HYDROCHLORIDE 5 MG: 5 TABLET ORAL at 07:37

## 2025-05-25 RX ADMIN — DOXYCYCLINE HYCLATE 100 MG: 100 CAPSULE ORAL at 20:17

## 2025-05-25 RX ADMIN — METOPROLOL TARTRATE 5 MG: 5 INJECTION INTRAVENOUS at 11:00

## 2025-05-25 RX ADMIN — METOPROLOL TARTRATE 5 MG: 5 INJECTION INTRAVENOUS at 16:00

## 2025-05-25 RX ADMIN — METOPROLOL TARTRATE 25 MG: 25 TABLET, FILM COATED ORAL at 20:18

## 2025-05-25 RX ADMIN — METOPROLOL TARTRATE 5 MG: 5 INJECTION INTRAVENOUS at 06:27

## 2025-05-25 RX ADMIN — AMIODARONE HYDROCHLORIDE 0.5 MG/MIN: 1.8 INJECTION, SOLUTION INTRAVENOUS at 07:28

## 2025-05-25 RX ADMIN — AMIODARONE HYDROCHLORIDE 0.5 MG/MIN: 1.8 INJECTION, SOLUTION INTRAVENOUS at 20:18

## 2025-05-25 RX ADMIN — FUROSEMIDE 10 MG/HR: 10 INJECTION, SOLUTION INTRAVENOUS at 08:41

## 2025-05-25 RX ADMIN — HYDROXYZINE HYDROCHLORIDE 25 MG: 25 TABLET, FILM COATED ORAL at 16:03

## 2025-05-25 RX ADMIN — SENNOSIDES AND DOCUSATE SODIUM 2 TABLET: 50; 8.6 TABLET ORAL at 07:40

## 2025-05-25 RX ADMIN — METOPROLOL TARTRATE 25 MG: 25 TABLET, FILM COATED ORAL at 07:38

## 2025-05-25 ASSESSMENT — ACTIVITIES OF DAILY LIVING (ADL)
ADLS_ACUITY_SCORE: 51
ADLS_ACUITY_SCORE: 47
ADLS_ACUITY_SCORE: 47
ADLS_ACUITY_SCORE: 51
ADLS_ACUITY_SCORE: 47
ADLS_ACUITY_SCORE: 52
ADLS_ACUITY_SCORE: 51
ADLS_ACUITY_SCORE: 52
ADLS_ACUITY_SCORE: 52
ADLS_ACUITY_SCORE: 51
ADLS_ACUITY_SCORE: 47
ADLS_ACUITY_SCORE: 51
ADLS_ACUITY_SCORE: 47
ADLS_ACUITY_SCORE: 51

## 2025-05-25 NOTE — PLAN OF CARE
"[id number]  Goal Outcome Evaluation:  Patient Name: Kimberley  MRN: [id number]  Date of Admission: 5/22/2025  Reason for Admission: Aflutter RVR  Level of Care: [facility]    Vitals:   BP Readings from Last 1 Encounters:   05/25/25 110/74     Pulse Readings from Last 1 Encounters:   05/25/25 (!) 160     Wt Readings from Last 1 Encounters:   05/25/25 (!) 137.9 kg (304 lb)     Ht Readings from Last 1 Encounters:   05/22/25 1.626 m (5' 4\")     Estimated body mass index is 52.18 kg/m  as calculated from the following:    Height as of this encounter: 1.626 m (5' 4\").    Weight as of this encounter: 137.9 kg (304 lb).  Temp Readings from Last 1 Encounters:   05/25/25 98.8  F (37.1  C) (Oral)       Pain: Pain goal 0 Pain Rating 0 Effective pain medication/regimen 0      Assessment    Resp: 2L NC WISE. Denies cp. Sob.  Telemetry: Festus RVR '160. MD aware.   Orientation/Neuro: A/Ox4.   GI/: external cath diuresing well.  No BM this shift. Low sat fat NA <2400mg. 2L FR.tolerating well.   Skin/Wounds: see flow sheet.   Lines/Drains: PIV x3. PIV R lower forearm infusing Amio gtt, R upper forearm Infusing. Left AC SL.   Activity: noob this shift. Ref activities.   Sleep: good  Abnormal Labs: Hepxa re-check at 1305PM    Aggression Stop Light: Green          Patient Care Plan: continues Amio gtt,lasix gtt and heparin. PRN metop . EP consult on Tuesday.   "

## 2025-05-25 NOTE — PROVIDER NOTIFICATION
MD Notification    Notified Person: MD    Notified Person Name:Aaron Cowart    Notification Date/Time:  05/24/25.0316    Notification Interaction:  Vocera    Purpose of Notification:pt HR maintaining 150's,160's.    Orders Received: PRN Metoprolol. See MAR.   Comments:

## 2025-05-25 NOTE — PLAN OF CARE
Goal Outcome Evaluation:      Plan of Care Reviewed With: patient    Overall Patient Progress: no changeOverall Patient Progress: no change    Heart Failure Care Map  GOALS TO BE MET BEFORE DISCHARGE:    1. Decrease congestion and/or edema with diuretic therapy to achieve near optimal volume status.     Dyspnea improved: No, further care required to meet this goal. Please explain declines activity, WISE, HR still 160s at rest, unable to tolerate activity   Edema improved: Yes, satisfactory for discharge.        Last 24 hour I/O:   Intake/Output Summary (Last 24 hours) at 5/24/2025 2037  Last data filed at 5/24/2025 2000  Gross per 24 hour   Intake 1918 ml   Output 8250 ml   Net -6332 ml           Net I/O and Weights since admission:   04/24 2300 - 05/24 2259  In: 3699.57 [P.O.:2430; I.V.:979.57]  Out: 98100 [Urine:42205]  Net: -29322.43     Vitals:    05/22/25 1751 05/23/25 0300 05/24/25 0552   Weight: (!) 147.6 kg (325 lb 6.4 oz) (!) 145.9 kg (321 lb 9.6 oz) (!) 137.8 kg (303 lb 12.8 oz)       2.  O2 sats > 90% on room air, or at prior home O2 therapy level.      Able to wean O2 this shift to keep sats above 90%?: Yes, satisfactory for discharge.   Does patient use Home O2? No          Current oxygenation status:   SpO2: 93 %     O2 Device: None (Room air), Oxygen Delivery: 1 LPM    3.  Tolerates ambulation and mobility near baseline.     Ambulation: No, further care required to meet this goal. Please explain unable to ambulate with aflutter RVR 150s-160s   Times patient ambulated with staff this shift: 0    Please review the Heart Failure Care Map for additional HF goal outcomes.        [facility] Nursing Note    Patient Information  Name: Omayra Malone  Age: 60 year old    Admission Information  Date: 5/22/2025   Reason:Atrial flutter with rapid ventricular response (H) [I48.92]     Assessment  Orientation/Neuro: Alert and Oriented x4   Cardiac/Tele: Aflutter RVR  Resp: WISE   GI/: WDL No nausea, vomiting,  abdominal pain, diarrhea, constipation (no BM 2 days but pt states normal for her) or change in bowel habits, ext cath in place   Mobility: walks with assist   Pain: denies   Diet: Orders Placed This Encounter      Low Saturated Fat Na <2400 mg    Vital Signs  B/P: 100/73, T: 98.2, P: 158, R: 22, O2: 94% on RA      Plan  Amio gtt, lasix gtt, hep gtt, and IV push metop, EP consult on Stepan Dias, RN  5/24/2025

## 2025-05-25 NOTE — PROGRESS NOTES
A&O x4. VSS on room air. Tele A flutter/fib RVR. Intermittent c/o pain to lower extremities. Dyspnea has improved since this AM. Up A1.  Heparin lasix, and amio infusing. Continue to diurese.     Heart Failure Care Map  GOALS TO BE MET BEFORE DISCHARGE:    1. Decrease congestion and/or edema with diuretic therapy to achieve near optimal volume status.     Dyspnea improved: No, further care required to meet this goal. Please explain  dyspneic on exertion    Edema improved: No, further care required to meet this goal. Please explain edema improving, still present +1/2        Last 24 hour I/O:   Intake/Output Summary (Last 24 hours) at 5/25/2025 1806  Last data filed at 5/25/2025 1400  Gross per 24 hour   Intake 2068 ml   Output 4700 ml   Net -2632 ml           Net I/O and Weights since admission:   04/25 2300 - 05/25 2259  In: 5199.57 [P.O.:3830; I.V.:979.57]  Out: 19950 [Urine:19950]  Net: -86045.43     Vitals:    05/22/25 1751 05/23/25 0300 05/24/25 0552 05/25/25 0500   Weight: (!) 147.6 kg (325 lb 6.4 oz) (!) 145.9 kg (321 lb 9.6 oz) (!) 137.8 kg (303 lb 12.8 oz) (!) 137.9 kg (304 lb)    05/25/25 1505   Weight: 133.3 kg (293 lb 12.8 oz)       2.  O2 sats > 90% on room air, or at prior home O2 therapy level.      Able to wean O2 this shift to keep sats above 90%?: Yes, satisfactory for discharge.   Does patient use Home O2? No          Current oxygenation status:   SpO2: 98 %     O2 Device: Nasal cannula, Oxygen Delivery: 2 LPM    3.  Tolerates ambulation and mobility near baseline.     Ambulation: No, further care required to meet this goal. Please explain Pt still weak and dyspneic on exertion    Times patient ambulated with staff this shift: 3    Please review the Heart Failure Care Map for additional HF goal outcomes.    Yenifer Alanis RN  5/25/2025

## 2025-05-25 NOTE — PROGRESS NOTES
A&O x4. VSS on room air. Tele A flutter/fib RVR. Intermittent c/o pain to lower extremities. Dyspnea has improved since this AM. Up A1. Pt refused all activity today. Heparin lasix, and amio infusing. Continue to diurese.       Heart Failure Care Map  GOALS TO BE MET BEFORE DISCHARGE:    1. Decrease congestion and/or edema with diuretic therapy to achieve near optimal volume status.     Dyspnea improved: Yes, satisfactory for discharge.   Edema improved: No, further care required to meet this goal. Please explain +2 LE edema         Last 24 hour I/O:   Intake/Output Summary (Last 24 hours) at 5/25/2025 0715  Last data filed at 5/25/2025 0450  Gross per 24 hour   Intake 2528 ml   Output 8350 ml   Net -5822 ml           Net I/O and Weights since admission:   04/25 1500 - 05/25 1459  In: 4409.57 [P.O.:3140; I.V.:979.57]  Out: 17537 [Urine:01306]  Net: -20399.43     Vitals:    05/22/25 1751 05/23/25 0300 05/24/25 0552 05/25/25 0500   Weight: (!) 147.6 kg (325 lb 6.4 oz) (!) 145.9 kg (321 lb 9.6 oz) (!) 137.8 kg (303 lb 12.8 oz) (!) 137.9 kg (304 lb)       2.  O2 sats > 90% on room air, or at prior home O2 therapy level.      Able to wean O2 this shift to keep sats above 90%?: Yes, satisfactory for discharge.   Does patient use Home O2? No          Current oxygenation status:   SpO2: 96 %     O2 Device: Nasal cannula, Oxygen Delivery: 2 LPM    3.  Tolerates ambulation and mobility near baseline.     Ambulation: No, further care required to meet this goal. Please explain pt not willing to get out of bed this shift    Times patient ambulated with staff this shift: 0    Please review the Heart Failure Care Map for additional HF goal outcomes.    Yenifer Alanis RN  5/25/2025

## 2025-05-25 NOTE — PROGRESS NOTES
Per MAR, it lists IV Protonix as a possible vesicant. After discussion with floor pharmacist, per the extravasation protocol, IV Protonix is considered a irritant and is to be treated with warm compress.

## 2025-05-25 NOTE — PROGRESS NOTES
"Cardiology Progress Note  Marcello Benavides MD         Assessment and Plan:        Acute on chronic congestive heart failure with reduced ejection fraction, echo reveals EF of 35%.  Paroxysmal atrial flutter/fibrillation with rapid ventricular rate on amiodarone IV, previous cardioversion in the emergency room but now recurred.   Marked anemia and liver cirrhosis with previous history of hepatitis C, treated    Discussion  Patient continues to be in fluid overload and will continue IV Lasix drip.  Admission weight was 325 pounds and now 304 pounds.  Creatinine normal.  Potassium 3.3 and bicarb increasing.  Chloride low.  Will reduce the Lasix drip to 5 mg/h    Continues to have rapid ventricular rate with atrial flutter although rate decreases intermittently.  Failed cardioversion on admission.  Continue IV amiodarone for now.  Will switch to p.o. amiodarone tomorrow.  If there is hemodynamic compromise, may need repeat urgent cardioversion and hoping that it will now be successful given that she is on antiarrhythmic drugs.  OK would be indicated before cardioversion unless it is being done on a urgent/emergent basis.      Also oral metoprolol.  Also on IV metoprolol on as needed basis plus IV digoxin given yesterday.    Will likely need EP consult to consider ablation/repeat cardioversion on Tuesday.    Upper endoscopy by GI pending although they might defer if the elevated heart rates persist.       Today's medical decision making is of high complexity.                     Interval History:     Shortness of breath and fluid overload          Review of Systems:     The 5 point Review of Systems is negative other than noted in the HPI          Physical Exam:        Blood pressure 97/66, pulse (!) 154, temperature 98.8  F (37.1  C), temperature source Oral, resp. rate 20, height 1.626 m (5' 4\"), weight (!) 137.9 kg (304 lb), SpO2 94%.  Vitals:    05/23/25 0300 05/24/25 0552 05/25/25 0500   Weight: (!) 145.9 kg " (321 lb 9.6 oz) (!) 137.8 kg (303 lb 12.8 oz) (!) 137.9 kg (304 lb)     Weights since admission  Vitals:    05/22/25 1751 05/23/25 0300 05/24/25 0552 05/25/25 0500   Weight: (!) 147.6 kg (325 lb 6.4 oz) (!) 145.9 kg (321 lb 9.6 oz) (!) 137.8 kg (303 lb 12.8 oz) (!) 137.9 kg (304 lb)     Vital Signs with Ranges  Temp:  [98.1  F (36.7  C)-98.8  F (37.1  C)] 98.8  F (37.1  C)  Pulse:  [] 154  Resp:  [18-22] 20  BP: ()/(61-95) 97/66  SpO2:  [89 %-96 %] 94 %  I/O's Last 24 hours  I/O last 3 completed shifts:  In: 2528 [P.O.:1770; I.V.:758]  Out: 8350 [Urine:8350]  Net I/O since admission  05/20 0700 - 05/25 0659  In: 4409.57 [P.O.:3140; I.V.:979.57]  Out: 15948 [Urine:73702]  Net: -80985.43    EXAM:    Tachycardic, 2+ leg edema, decreased air entry at the bases.         Medications:        Current Facility-Administered Medications   Medication Dose Route Frequency Provider Last Rate Last Admin    cyanocobalamin (VITAMIN B-12) tablet 500 mcg  500 mcg Oral Daily Loki Anderson MD   500 mcg at 05/25/25 0738    doxycycline hyclate (VIBRAMYCIN) capsule 100 mg  100 mg Oral BID Loco Murray MD   100 mg at 05/25/25 0737    lamoTRIgine (LaMICtal) tablet 100 mg  100 mg Oral Daily Loco Murray MD   100 mg at 05/25/25 0737    metoprolol (LOPRESSOR) injection 5 mg  5 mg Intravenous Q6H Marcello Benavides MD   5 mg at 05/25/25 0627    metoprolol tartrate (LOPRESSOR) tablet 25 mg  25 mg Oral BID Dillan Cardoza MD   25 mg at 05/25/25 0738    nicotine (NICODERM CQ) 7 MG/24HR 24 hr patch 1 patch  1 patch Transdermal Daily Loki Anderson MD        pantoprazole (PROTONIX) IV push injection 40 mg  40 mg Intravenous BID Loki Anderson MD   40 mg at 05/25/25 0735    sodium chloride (PF) 0.9% PF flush 3 mL  3 mL Intracatheter Q8H [facility] Loco Murray MD   3 mL at 05/25/25 0630            Data:      All new lab and imaging data was reviewed.   Recent Labs   Lab Test 05/25/25  0607 05/24/25  0718 05/23/25  1160  "25  0601 25  1743 05/15/24  1941   WBC  --   --   --  9.1 9.6 6.7   HGB 8.8* 8.1* 8.7* 7.8* 8.0* 11.8   MCV 71* 71* 73* 74* 72* 89   PLT  --   --   --  249 275 234      Recent Labs   Lab Test 25  0607 25  0718 25  0601    141 137   POTASSIUM 3.3* 3.5 4.2   CHLORIDE 93* 98 104   CO2 32* 32* 24   BUN 17.5 18.4 17.2   CR 0.81 0.83 0.64   ANIONGAP 13 11 9   KODI 8.6* 8.7* 9.1   * 112* 106*     No lab results found.    Invalid input(s): \"TROP\", \"TROPONINIES\"           Imaging:   Recent Results (from the past 24 hours)   Echocardiogram Complete   Result Value    LVEF  35%    Narrative    [id number]  [id number]  [id number]  [id number]^DORIAN^JOHANNA     [facility]  Echocardiography Laboratory  [address] [place] [address]     Name: FLAKITO MCCOY  MRN: [id number]  : 1965  Study Date: 2025 11:23 AM  Age: 60 yrs  Gender: Female  Patient Location: [facility]  Reason For Study: Atrial Flutter  Ordering Physician: JOHANNA ALARCON  Referring Physician: JOHANNA ALARCON  Performed By: Emperatriz Burt     BSA: 2.4 m2  Height: 64 in  Weight: 321 lb  HR: 74  BP: 139/77 mmHg  ______________________________________________________________________________  Procedure  Echocardiogram with two-dimensional, color and spectral Doppler.  ______________________________________________________________________________  Interpretation Summary     1. The left ventricle is moderately dilated. The visual ejection fraction is  estimated at 35%. There is moderate global hypokinesia of the left ventricle.  2. The right ventricle is mildly dilated. Mildly decreased right ventricular  systolic function  3. There is mild to moderate (1-2+) mitral regurgitation.  4. There is moderate (2+) tricuspid regurgitation.  5. Mild (35-45mmHg) pulmonary hypertension is present. The right ventricular  systolic pressure is approximated at 41mmHg plus the right atrial pressure.     Echo 2024 from " [facility] reported EF 55%.  ______________________________________________________________________________  Left Ventricle  The left ventricle is moderately dilated. The visual ejection fraction is  estimated at 35%. Left ventricular diastolic function is indeterminate. There  is moderate global hypokinesia of the left ventricle.     Right Ventricle  The right ventricle is mildly dilated. Mildly decreased right ventricular  systolic function.     Atria  The left atrium is severely dilated. The right atrium is moderately dilated.  There is no atrial shunt seen.     Mitral Valve  There is mild to moderate (1-2+) mitral regurgitation. Mitral mean 5mmHg, some  restriction of leaflet opening.     Tricuspid Valve  There is moderate (2+) tricuspid regurgitation. Mild (35-45mmHg) pulmonary  hypertension is present. The right ventricular systolic pressure is  approximated at 41mmHg plus the right atrial pressure.     Aortic Valve  The aortic valve is normal in structure and function.     Pulmonic Valve  The pulmonic valve is normal in structure and function.     Vessels  Normal ascending, transverse (arch), and descending aorta. Dilation of the  inferior vena cava is present with abnormal respiratory variation in diameter.     Pericardium  There is no pericardial effusion.     Rhythm  Sinus rhythm was noted.  ______________________________________________________________________________  MMode/2D Measurements & Calculations  IVSd: 1.3 cm     LVIDd: 5.8 cm  LVIDs: 4.1 cm  LVPWd: 0.96 cm  FS: 28.8 %  LV mass(C)d: 269.2 grams  LV mass(C)dI: 112.6 grams/m2  Ao root diam: 3.1 cm  asc Aorta Diam: 3.5 cm  LVOT diam: 1.9 cm  LVOT area: 2.9 cm2  Ao root diam index Ht(cm/m): 1.9  Ao root diam index BSA (cm/m2): 1.3  Asc Ao diam index BSA (cm/m2): 1.5  Asc Ao diam index Ht(cm/m): 2.2  EF Biplane: 34.7 %  LA Volume (BP): 163.0 ml     LA Volume Index (BP): 68.2 ml/m2  RV Base: 4.2 cm  RWT: 0.33  TAPSE: 1.7 cm     Doppler Measurements &  Calculations  MV E max yaya: 130.0 cm/sec  MV max P.9 mmHg  MV mean P.0 mmHg  MV V2 VTI: 41.7 cm  MVA(VTI): 1.3 cm2  MV dec time: 0.25 sec  Ao V2 max: 205.1 cm/sec  Ao max P.0 mmHg  Ao V2 mean: 138.8 cm/sec  Ao mean P.4 mmHg  Ao V2 VTI: 36.2 cm  GINA(I,D): 1.5 cm2  GINA(V,D): 1.3 cm2  LV V1 max PG: 3.6 mmHg  LV V1 max: 94.3 cm/sec  LV V1 VTI: 18.9 cm  SV(LVOT): 55.5 ml  SI(LVOT): 23.2 ml/m2  PA V2 max: 85.7 cm/sec  PA max P.9 mmHg  PA acc time: 0.10 sec  TR max yaya: 322.1 cm/sec  TR max P.5 mmHg     AV Yaya Ratio (DI): 0.46  GINA Index (cm2/m2): 0.64  E/E' avg: 15.7  Lateral E/e': 15.1  Medial E/e': 16.4     ______________________________________________________________________________  Report approved by: Jakob Wahl MD on 2025 12:26 PM

## 2025-05-25 NOTE — PROGRESS NOTES
[facility]  Hospitalist Progress Note   05/25/2025          Assessment and Plan:       Omayra Malone is a 60 year old female with history of hypertension, hepatitis C, liver cirrhosis, chronic anemia, obesity status post gastric bypass, bipolar affective disorder, MEREDITH, hypothyroidism, osteoarthritis admitted on 5/22/2025 for shortness of breath, palpitations.    Acute heart failure with reduced EF of 35%, decompensated  Atrial flutter with RVR-status post DCCV on 5/22, flipped to normal sinus rhythm, now in A-fib  Acute hypoxic respiratory failure multifactorial from heart failure exacerbation, atrial flutter, Anemia, obstructive sleep apnea, OHS, deconditioning.  Moderate tricuspid regurgitation.  Mild pulmonary hypertension.  Mild to moderate MR.  Hypertension.  - Patient resident of [facility], presented with shortness of breath and palpitations.  Patient initially denied drug use,  urine positive for cocaine.  Reports recent use.  --On EMS arrival the patient was found to be in SVT at a pulse of 176. En route to the ED they attempted 6mg Adenosine, followed shortly after by another 12 mg with no success. -SpO2 was 94% en route to the ED. in ED heart rate in 170s, blood pressure in 120s systolic.  Sodium 141, potassium 3.9, creatinine 0.62.  -WBC 9.6, TSH 2.06.  N-terminal proBNP 1648.  Troponin high-sensitivity 12 >1  -Chest x-ray no definitive evidence for pulmonary edema, no concerns for infection.  -EKG sinus tachycardia, left axis deviation, ST-T wave changes.  -Echocardiogram with LVEF of 35%, moderate global hypokinesis of the left ventricle.  Right ventricle  mildly dilated.  Moderate mitral regurgitation, moderate tricuspid regurgitation.  Mild pulmonary hypertension.  -- 5/23 - was on intravenous heparin drip, initiated IV Lasix drip for diuresis.  -5/24 --in A-fib with rates in 140s to 150s.  Initiated intravenous amiodarone drip.    -- 5/25 patient continues to be in A-fib with RVR,  rates in 140s to 150s.  Denies any chest pain.  No palpitations.  Does admit to some improvement in shortness of breath.    --Cardiology following, plan for EP evaluation on Tuesday.  Emergent cardioversion if decompensation.  Appreciate input.  --Continue intravenous amiodarone drip.  Continue intravenous heparin drip.  Continue intravenous Lasix drip.  Continue scheduled oral metoprolol 25 mg twice daily.  IV metoprolol per cardiology.  Hold prior to admission amlodipine, hydrochlorothiazide to allow room for diuresis, beta-blocker.    Continue telemetry monitoring.  Intake output monitoring, daily weights.  2000 mL fluid restriction.  Anxiety management as below.  Emphasized need to consider abstinence from cocaine/ nicotine use.  Risk factor modification.     Hypokalemia, hypomagnesemia.  Likely from diuresis.  Potassium 3.3, magnesium 1.4.  Keep potassium around 4, magnesium around 2, replacement protocols ordered    Acute on chronic anemia -likely dilutional, rule out blood loss.  Microcytic anemia, severe iron deficiency.  Vitamin B12 deficiency.  Hemoglobin 8.0 from previous 11.8 1-year ago, patient denies bleeding or melena. Does have history of gastric bypass does not appear to be on B12.    -Iron saturation index 4, vitamin B12 150.  CK29.  Folate 16.7.  UA no hematuria.  -IV Venofer for 2 doses -5/23, 5/24.   Initiated on oral iron, vitamin B12 supplements -to continue.  Continue IV Protonix.  Consent for blood transfusion obtained, transfuse for hemoglobin less than 8 or symptomatic.    [facility] following, no plans for endoscopy over the weekend unless active bleeding.  Appreciate input.  Will consider CT imaging if any active bleeding.    Cirrhosis secondary to Hep C and NAFLD  Noted- per chart review, [facility] 2021 well-compensated cirrhosis due to Hep C (treated) and NAFLD.   IV pantoprazole as above.    Left hand pain  Reports sudden pain while holding something a few weeks back.  -- Patient  has been receiving diuresis with intravenous Lasix, significant improvement in swelling, erythema.  Continues to complain of pain.    Afebrile, no leukocytosis CRP 4.74, .  Cold compress.  X-ray left hand / thumb -rule out fractures  Monitor closely.    Cocaine use.  Patient initially denied drug use, group home staff reported concern for cocaine use.  Urine drug screen positive for cocaine.  Patient reports recent use -with her boyfriend.  Emphasized need to consider abstinence, will need chemical dependency evaluation once closer to discharge.      Acute on chronic anxiety.  Bipolar affective disorder  Continue PTA lamictal 100mg daily  Continue PTA xanax PRN at reduce dose (does not take when she is prescribed oxycodone which she currently is for leg pain)  As needed Atarax 4 times a day     Chronic leg and back pain   Follows with [facility] pain clinic, chronically on oxycodone.  Continue PTA oxycodone 5mg q8prn, minimize narcotic use as able to.     History of hypothyroidism.  PTA not on meds.  TSH within normal limits     Medically complicated obesity s/p gastric bypass  Body mass index is 55.85 kg/m . Recommend outpatient follow up/ongoing weight loss.      MEREDITH  Does not use CPAP.  Consider sleep studies as outpatient.     Chronic doxycycline use  No clear indication on chart review, reports chronically used.  Continue for now     Physical deconditioning from medical illness.  PT, OT evaluation prior to discharge.  Fall precautions.  Encourage ambulation out of bed as able to.    Nicotine use.  Reports intermittent vaping.  Emphasized need to consider abstinence, nicotine patch ordered    Goals of care discussion.  Patient critically ill with ongoing atrial fibrillation, hypoxic respiratory failure, heart failure.  At length discussed with patient, full code at this time.     Clinically Significant Risk Factors     # Morbid Obesity: Estimated body mass index is 50.43 kg/m  as calculated from the  "following:    Height as of this encounter: 1.626 m (5' 4\").    Weight as of this encounter: 133.3 kg (293 lb 12.8 oz)., PRESENT ON ADMISSION     # Financial/Environmental Concerns: none      Orders Placed This Encounter      Low Saturated Fat Na <2400 mg      DVT Prophylaxis: SCDs, on intravenous heparin  Code Status: Full Code  Disposition: Expected discharge in greater than 2 days.    Medically Ready for Discharge: Anticipated in 2-4 Days     Discussed with patient, bedside RN.  High complexity.  More than 70% of time spent in direct patient care, care coordination, patient counseling, and formalizing plan of care.        Loki Anderson MD        Interval History:        Patient lying in bed.    Denies any chest pain.  Reporting shortness of breath on rest.  Has not ambulated out of bed.  Complaining of left hand pain, receiving oxycodone.  Denies any palpitations.  Denies any abdominal pain.  No nausea or vomiting.   Had breakfast this morning.  Denies any blood in the stools or blood in the urine.  In A-fib with RVR.  On intravenous heparin drip.  On intravenous amiodarone drip.  On intravenous Lasix to         Physical Exam:        Physical Exam   Temp:  [98.2  F (36.8  C)-98.8  F (37.1  C)] 98.8  F (37.1  C)  Pulse:  [] 154  Resp:  [18-22] 20  BP: ()/(61-95) 108/87  SpO2:  [89 %-96 %] 94 %    Intake/Output Summary (Last 24 hours) at 5/23/2025 1419  Last data filed at 5/23/2025 1326  Gross per 24 hour   Intake 1030 ml   Output 2900 ml   Net -1870 ml       Admission Weight: (!) 147.6 kg (325 lb 6.4 oz)  Current Weight: (!) 145.9 kg (321 lb 9.6 oz)    PHYSICAL EXAM  GENERAL: Patient is in no distress.    HEART: irregular rate and rhythm. S1S2.  Tachycardia  LUNGS: Bilateral decreased breath sounds, technically difficult due to body habitus.  Respirations unlabored  ABDOMEN: Soft, distended, no abdominal tenderness, bowel sounds heard   NEURO: Moving all extremities.  Left thumb area some restricted " range of motion.  No erythema, no tenderness.  EXTREMITIES: ++ pedal edema.   SKIN: Warm, dry. No rash   PSYCHIATRY Cooperative       Medications:        Current Facility-Administered Medications   Medication Dose Route Frequency Provider Last Rate Last Admin    cyanocobalamin (VITAMIN B-12) tablet 500 mcg  500 mcg Oral Daily Loki Anderson MD   500 mcg at 05/25/25 0738    doxycycline hyclate (VIBRAMYCIN) capsule 100 mg  100 mg Oral BID Loco Murray MD   100 mg at 05/25/25 0737    lamoTRIgine (LaMICtal) tablet 100 mg  100 mg Oral Daily Loco Murray MD   100 mg at 05/25/25 0737    metoprolol tartrate (LOPRESSOR) tablet 25 mg  25 mg Oral BID Dillan Cardoza MD   25 mg at 05/25/25 0738    nicotine (NICODERM CQ) 7 MG/24HR 24 hr patch 1 patch  1 patch Transdermal Daily Loki Anderson MD        pantoprazole (PROTONIX) IV push injection 40 mg  40 mg Intravenous BID Loki Anderson MD   40 mg at 05/25/25 0735    sodium chloride (PF) 0.9% PF flush 3 mL  3 mL Intracatheter Q8H PHU Loco Murray MD   3 mL at 05/25/25 0630     Current Facility-Administered Medications   Medication Dose Route Frequency Provider Last Rate Last Admin    acetaminophen (TYLENOL) tablet 975 mg  975 mg Oral Q6H PRN Loco Murray MD   975 mg at 05/25/25 0737    ALPRAZolam (XANAX) tablet 0.25 mg  0.25 mg Oral TID PRN Loki Anderson MD        calcium carbonate (TUMS) chewable tablet 1,000 mg  1,000 mg Oral 4x Daily PRN Loco Murray MD        lidocaine (LMX4) cream   Topical Q1H PRN Loco Murray MD        lidocaine 1 % 0.1-1 mL  0.1-1 mL Other Q1H PRN Loco Murray MD        melatonin tablet 5 mg  5 mg Oral At Bedtime PRN Loco Murray MD        metoprolol (LOPRESSOR) injection 5 mg  5 mg Intravenous Q6H PRN Marcello Benavides MD        miconazole (MICATIN) 2 % powder   Topical Daily PRN Loco Murray MD   Given at 05/25/25 1129    naloxone (NARCAN) injection 0.2 mg  0.2 mg Intravenous Q2 Min PRN Loco Murray MD        Or     naloxone (NARCAN) injection 0.4 mg  0.4 mg Intravenous Q2 Min PRN Loco Murray MD        Or    naloxone (NARCAN) injection 0.2 mg  0.2 mg Intramuscular Q2 Min PRN Loco Murray MD        Or    naloxone (NARCAN) injection 0.4 mg  0.4 mg Intramuscular Q2 Min PRN Loco Murray MD        oxyCODONE (ROXICODONE) tablet 5 mg  5 mg Oral Q8H PRN Loco Murray MD   5 mg at 05/25/25 0737    Patient is already receiving anticoagulation with heparin, enoxaparin (LOVENOX), warfarin (COUMADIN)  or other anticoagulant medication   Does not apply Continuous PRN Loco Murray MD        senna-docusate (SENOKOT-S/PERICOLACE) 8.6-50 MG per tablet 1 tablet  1 tablet Oral BID PRN Loco Murray MD        Or    senna-docusate (SENOKOT-S/PERICOLACE) 8.6-50 MG per tablet 2 tablet  2 tablet Oral BID PRLoco Grigsby MD   2 tablet at 05/25/25 0740    sodium chloride (PF) 0.9% PF flush 3 mL  3 mL Intracatheter q1 min prLoco Grigsby MD   3 mL at 05/25/25 1100            Data:      All new lab and imaging data was reviewed.

## 2025-05-26 LAB
ANION GAP SERPL CALCULATED.3IONS-SCNC: 11 MMOL/L (ref 7–15)
BUN SERPL-MCNC: 22.8 MG/DL (ref 8–23)
CALCIUM SERPL-MCNC: 8.8 MG/DL (ref 8.8–10.4)
CHLORIDE SERPL-SCNC: 94 MMOL/L (ref 98–107)
CREAT SERPL-MCNC: 0.94 MG/DL (ref 0.51–0.95)
EGFRCR SERPLBLD CKD-EPI 2021: 69 ML/MIN/1.73M2
ERYTHROCYTE [DISTWIDTH] IN BLOOD BY AUTOMATED COUNT: 21 % (ref 10–15)
GLUCOSE SERPL-MCNC: 116 MG/DL (ref 70–99)
HCO3 SERPL-SCNC: 31 MMOL/L (ref 22–29)
HCT VFR BLD AUTO: 28.6 % (ref 35–47)
HGB BLD-MCNC: 8.6 G/DL (ref 11.7–15.7)
MAGNESIUM SERPL-MCNC: 2.1 MG/DL (ref 1.7–2.3)
MCH RBC QN AUTO: 21.7 PG (ref 26.5–33)
MCHC RBC AUTO-ENTMCNC: 30.1 G/DL (ref 31.5–36.5)
MCV RBC AUTO: 72 FL (ref 78–100)
PLATELET # BLD AUTO: 292 10E3/UL (ref 150–450)
POTASSIUM SERPL-SCNC: 3.7 MMOL/L (ref 3.4–5.3)
POTASSIUM SERPL-SCNC: 4.7 MMOL/L (ref 3.4–5.3)
RBC # BLD AUTO: 3.96 10E6/UL (ref 3.8–5.2)
SODIUM SERPL-SCNC: 136 MMOL/L (ref 135–145)
UFH PPP CHRO-ACNC: 0.18 IU/ML (ref ?–1.1)
UFH PPP CHRO-ACNC: 0.33 IU/ML (ref ?–1.1)
UFH PPP CHRO-ACNC: 0.35 IU/ML (ref ?–1.1)
WBC # BLD AUTO: 10 10E3/UL (ref 4–11)

## 2025-05-26 PROCEDURE — 93005 ELECTROCARDIOGRAM TRACING: CPT

## 2025-05-26 PROCEDURE — 83735 ASSAY OF MAGNESIUM: CPT | Performed by: HOSPITALIST

## 2025-05-26 PROCEDURE — 85520 HEPARIN ASSAY: CPT | Performed by: HOSPITALIST

## 2025-05-26 PROCEDURE — 250N000011 HC RX IP 250 OP 636: Performed by: INTERNAL MEDICINE

## 2025-05-26 PROCEDURE — 210N000001 HC R&B IMCU HEART CARE

## 2025-05-26 PROCEDURE — 82565 ASSAY OF CREATININE: CPT | Performed by: HOSPITALIST

## 2025-05-26 PROCEDURE — 250N000011 HC RX IP 250 OP 636: Mod: JZ | Performed by: INTERNAL MEDICINE

## 2025-05-26 PROCEDURE — 36415 COLL VENOUS BLD VENIPUNCTURE: CPT | Performed by: HOSPITALIST

## 2025-05-26 PROCEDURE — 99233 SBSQ HOSP IP/OBS HIGH 50: CPT | Performed by: INTERNAL MEDICINE

## 2025-05-26 PROCEDURE — 85027 COMPLETE CBC AUTOMATED: CPT

## 2025-05-26 PROCEDURE — 250N000013 HC RX MED GY IP 250 OP 250 PS 637: Performed by: INTERNAL MEDICINE

## 2025-05-26 PROCEDURE — 84132 ASSAY OF SERUM POTASSIUM: CPT | Performed by: HOSPITALIST

## 2025-05-26 PROCEDURE — 250N000011 HC RX IP 250 OP 636: Performed by: HOSPITALIST

## 2025-05-26 PROCEDURE — 250N000011 HC RX IP 250 OP 636

## 2025-05-26 PROCEDURE — 36415 COLL VENOUS BLD VENIPUNCTURE: CPT

## 2025-05-26 PROCEDURE — 99207 PR NO BILLABLE SERVICE THIS VISIT: CPT | Performed by: NURSE PRACTITIONER

## 2025-05-26 PROCEDURE — 258N000003 HC RX IP 258 OP 636: Performed by: INTERNAL MEDICINE

## 2025-05-26 PROCEDURE — 250N000013 HC RX MED GY IP 250 OP 250 PS 637

## 2025-05-26 PROCEDURE — 250N000009 HC RX 250: Performed by: INTERNAL MEDICINE

## 2025-05-26 PROCEDURE — 250N000013 HC RX MED GY IP 250 OP 250 PS 637: Performed by: HOSPITALIST

## 2025-05-26 RX ORDER — TORSEMIDE 20 MG/1
20 TABLET ORAL 2 TIMES DAILY
Status: DISCONTINUED | OUTPATIENT
Start: 2025-05-26 | End: 2025-05-28

## 2025-05-26 RX ORDER — POTASSIUM CHLORIDE 1500 MG/1
20 TABLET, EXTENDED RELEASE ORAL ONCE
Status: COMPLETED | OUTPATIENT
Start: 2025-05-26 | End: 2025-05-26

## 2025-05-26 RX ORDER — AMIODARONE HYDROCHLORIDE 200 MG/1
400 TABLET ORAL 2 TIMES DAILY
Status: DISCONTINUED | OUTPATIENT
Start: 2025-05-26 | End: 2025-05-30

## 2025-05-26 RX ORDER — MORPHINE SULFATE 2 MG/ML
2 INJECTION, SOLUTION INTRAMUSCULAR; INTRAVENOUS
Status: DISCONTINUED | OUTPATIENT
Start: 2025-05-26 | End: 2025-06-02 | Stop reason: HOSPADM

## 2025-05-26 RX ADMIN — MORPHINE SULFATE 2 MG: 2 INJECTION, SOLUTION INTRAMUSCULAR; INTRAVENOUS at 22:10

## 2025-05-26 RX ADMIN — OXYCODONE HYDROCHLORIDE 5 MG: 5 TABLET ORAL at 11:56

## 2025-05-26 RX ADMIN — PANTOPRAZOLE SODIUM 40 MG: 40 INJECTION, POWDER, FOR SOLUTION INTRAVENOUS at 07:38

## 2025-05-26 RX ADMIN — PANTOPRAZOLE SODIUM 40 MG: 40 INJECTION, POWDER, FOR SOLUTION INTRAVENOUS at 20:09

## 2025-05-26 RX ADMIN — METOPROLOL TARTRATE 25 MG: 25 TABLET, FILM COATED ORAL at 20:09

## 2025-05-26 RX ADMIN — OXYCODONE HYDROCHLORIDE 5 MG: 5 TABLET ORAL at 20:10

## 2025-05-26 RX ADMIN — HEPARIN SODIUM 1800 UNITS/HR: 10000 INJECTION, SOLUTION INTRAVENOUS at 11:54

## 2025-05-26 RX ADMIN — METOPROLOL TARTRATE 5 MG: 5 INJECTION INTRAVENOUS at 11:54

## 2025-05-26 RX ADMIN — AMIODARONE HYDROCHLORIDE 400 MG: 200 TABLET ORAL at 20:10

## 2025-05-26 RX ADMIN — SENNOSIDES AND DOCUSATE SODIUM 2 TABLET: 50; 8.6 TABLET ORAL at 07:41

## 2025-05-26 RX ADMIN — AMIODARONE HYDROCHLORIDE 400 MG: 200 TABLET ORAL at 11:56

## 2025-05-26 RX ADMIN — METOPROLOL TARTRATE 25 MG: 25 TABLET, FILM COATED ORAL at 07:41

## 2025-05-26 RX ADMIN — HYALURONIDASE (HUMAN RECOMBINANT) 150 UNITS: 150 INJECTION, SOLUTION SUBCUTANEOUS at 05:12

## 2025-05-26 RX ADMIN — FUROSEMIDE 5 MG/HR: 10 INJECTION, SOLUTION INTRAVENOUS at 05:46

## 2025-05-26 RX ADMIN — DOXYCYCLINE HYCLATE 100 MG: 100 CAPSULE ORAL at 07:41

## 2025-05-26 RX ADMIN — DOXYCYCLINE HYCLATE 100 MG: 100 CAPSULE ORAL at 20:09

## 2025-05-26 RX ADMIN — OXYCODONE HYDROCHLORIDE 5 MG: 5 TABLET ORAL at 01:02

## 2025-05-26 RX ADMIN — LAMOTRIGINE 100 MG: 100 TABLET ORAL at 07:41

## 2025-05-26 RX ADMIN — ACETAMINOPHEN 975 MG: 325 TABLET, FILM COATED ORAL at 05:48

## 2025-05-26 RX ADMIN — TORSEMIDE 20 MG: 20 TABLET ORAL at 11:56

## 2025-05-26 RX ADMIN — TORSEMIDE 20 MG: 20 TABLET ORAL at 20:09

## 2025-05-26 RX ADMIN — CYANOCOBALAMIN TAB 500 MCG 500 MCG: 500 TAB at 07:41

## 2025-05-26 RX ADMIN — AMIODARONE HYDROCHLORIDE 0.5 MG/MIN: 1.8 INJECTION, SOLUTION INTRAVENOUS at 07:46

## 2025-05-26 RX ADMIN — POTASSIUM CHLORIDE 20 MEQ: 1500 TABLET, EXTENDED RELEASE ORAL at 01:02

## 2025-05-26 ASSESSMENT — ACTIVITIES OF DAILY LIVING (ADL)
ADLS_ACUITY_SCORE: 52

## 2025-05-26 NOTE — PLAN OF CARE
Neuro: A&Ox4  Tele/Cardiac: A-flutter RVR 150s. PRN IV metoprolol given x1. Amio gtt  Resp: RA  Activity: A1 to commode   Pain: 7/10 leg pain reported. PRN oxycodone given x1, tylenol given x1  Drips/IV: heparin 1800 unit/hr, amio 0.5 mg/min, lasix 5mg/hr. R forearm extravasation overnight. Protocol followed, antidote administered.  GI/: purewick in place with adequate output  Skin: scab on R ear, rash under breast and groin area  Diet: Diet: Fluid restriction 2000 ML FLUID  Low Saturated Fat Na <2400 mg     Test/Procedures: EGD Tuesday 0815  Plan: EGD Tuesday, needs ablation and ischemic workup- TBD

## 2025-05-26 NOTE — PROGRESS NOTES
[facility]    Medicine Progress Note - Hospitalist Service    Date of Admission:  5/22/2025    Assessment & Plan   Omayra Malone is a 60 year old female with history of hypertension, hepatitis C, liver cirrhosis, chronic anemia, obesity status post gastric bypass, bipolar affective disorder, MEREDITH, hypothyroidism, osteoarthritis admitted on 5/22/2025 for shortness of breath, palpitations.     Acute heart failure with reduced EF of 35%, decompensated  Atrial flutter with RVR-status post DCCV on 5/22, flipped to normal sinus rhythm, now in A flutter  Acute hypoxic respiratory failure multifactorial from heart failure exacerbation, atrial flutter, Anemia, obstructive sleep apnea, OHS, deconditioning.  Moderate tricuspid regurgitation.  Mild pulmonary hypertension.  Mild to moderate MR.  Hypertension.  *Patient resident of [facility], presented with shortness of breath and palpitations.  Patient initially denied drug use,  urine positive for cocaine.  Reports recent use.  *On EMS arrival the patient was found to be in SVT at a pulse of 176. En route to the ED they attempted 6mg Adenosine, followed shortly after by another 12 mg with no success. -SpO2 was 94% en route to the ED. in ED heart rate in 170s, blood pressure in 120s systolic.  Sodium 141, potassium 3.9, creatinine 0.62.  *WBC 9.6, TSH 2.06.  N-terminal proBNP 1648.  Troponin high-sensitivity 12 >1  *Chest x-ray no definitive evidence for pulmonary edema, no concerns for infection.  *EKG sinus tachycardia, left axis deviation, ST-T wave changes.  *Echocardiogram with LVEF of 35%, moderate global hypokinesis of the left ventricle.  Right ventricle  mildly dilated.  Moderate mitral regurgitation, moderate tricuspid regurgitation.  Mild pulmonary hypertension.  *5/23 - was on intravenous heparin drip, initiated IV Lasix drip for diuresis.  *5/24 --in A-fib/flutter with rates in 140s to 150s.  Initiated intravenous amiodarone drip.     *5/25-5/26 patient continues to be in Aflutter with RVR, rates in 140s to 150s.  Denies any chest pain.  No palpitations.  Does admit to some improvement in shortness of breath.      - cardiology following, plan for EP evaluation on Tuesday.   - Amio drip transition to PO amiodarone on 5/26/2025  - Continue intravenous heparin drip.  - Continue intravenous Lasix drip-->> Torsemide 20mg daily on 5/26/2025  - Continue scheduled oral metoprolol 25 mg twice daily.  IV metoprolol per cardiology.  -Hold prior to admission amlodipine, hydrochlorothiazide to allow room for diuresis, beta-blocker.    -Continue telemetry monitoring.  - Intake output monitoring, daily weights.  2000 mL fluid restriction.       Hypokalemia, hypomagnesemia.  Likely from diuresis.  Potassium 3.3, magnesium 1.4.  Keep potassium around 4, magnesium around 2, replacement protocols ordered     Acute on chronic anemia -likely dilutional, rule out blood loss.  Microcytic anemia, severe iron deficiency.  Vitamin B12 deficiency.  Hemoglobin 8.0 from previous 11.8 1-year ago, patient denies bleeding or melena. Does have history of gastric bypass does not appear to be on B12.    *Iron saturation index 4, vitamin B12 150.  CK29.  Folate 16.7.  UA no hematuria.  *IV Venofer for 2 doses -5/23, 5/24.   -Initiated on oral iron, vitamin B12 supplements -to continue.  -Continue IV Protonix.  -Consent for blood transfusion obtained, transfuse for hemoglobin less than 8 or symptomatic.    -[facility] following, no plans for endoscopy over the weekend unless active bleeding.  Appreciate input.  - Will consider CT imaging if any active bleeding.     Cirrhosis secondary to Hep C and NAFLD  Noted- per chart review, [facility] 2021 well-compensated cirrhosis due to Hep C (treated) and NAFLD.   IV pantoprazole as above.     Left hand pain  Reports sudden pain while holding something a few weeks back.  Patient has been receiving diuresis with intravenous Lasix,  significant improvement in swelling, erythema.  Continues to complain of pain.    Afebrile, no leukocytosis CRP 4.74, .  X-ray left hand / thumb (5/25)-Negative for fracture or dislocation. Moderate soft tissue swelling in the thumb. Moderate of osteoarthritis at the base of the thumb and the in CMC and STT joints, and throughout the IP joints with joint space narrowing and spurring.   Monitor closely.     Cocaine use.  Patient initially denied drug use, group home staff reported concern for cocaine use.  Urine drug screen positive for cocaine.  Patient reports recent use -with her boyfriend.  Emphasized need to consider abstinence, will need chemical dependency evaluation once closer to discharge.      Acute on chronic anxiety.  Bipolar affective disorder  Continue PTA lamictal 100mg daily  Continue PTA xanax PRN at reduce dose (does not take when she is prescribed oxycodone which she currently is for leg pain)  As needed Atarax 4 times a day     Chronic leg and back pain   Follows with [facility] pain clinic, chronically on oxycodone.  Continue PTA oxycodone 5mg q8prn, minimize narcotic use as able to.     History of hypothyroidism.  PTA not on meds.  TSH within normal limits     Medically complicated obesity s/p gastric bypass  Body mass index is 55.85 kg/m . Recommend outpatient follow up/ongoing weight loss.      MEREDITH  Does not use CPAP.  Consider sleep studies as outpatient.     Chronic doxycycline use  No clear indication on chart review, reports chronically used.  Continue for now      Physical deconditioning from medical illness.  PT, OT evaluation prior to discharge.  Fall precautions.  Encourage ambulation out of bed as able to.     Nicotine use.  Reports intermittent vaping.  Emphasized need to consider abstinence, nicotine patch ordered          Diet: Fluid restriction 2000 ML FLUID  Low Saturated Fat Na <2400 mg    DVT Prophylaxis: Heparin   Stafford Catheter: Not present  Lines: None     Cardiac  "Monitoring: ACTIVE order. Indication: Tachyarrhythmias, acute (48 hours)  Code Status: Full Code      Clinically Significant Risk Factors        # Hypokalemia: Lowest K = 3.3 mmol/L in last 2 days, will replace as needed   # Hypochloremia: Lowest Cl = 93 mmol/L in last 2 days, will monitor as appropriate    # Hypomagnesemia: Lowest Mg = 1.4 mg/dL in last 2 days, will replace as needed        # Acute heart failure with reduced ejection fraction: last echo with EF <40% and receiving IV diuretics           # Morbid Obesity: Estimated body mass index is 50.59 kg/m  as calculated from the following:    Height as of this encounter: 1.626 m (5' 4\").    Weight as of this encounter: 133.7 kg (294 lb 11.2 oz)., PRESENT ON ADMISSION     # Financial/Environmental Concerns: none         Social Drivers of Health    Tobacco Use: High Risk (1/9/2025)    Received from [facility]    Patient History     Smoking Tobacco Use: Some Days     Smokeless Tobacco Use: Former          Disposition Plan     Medically Ready for Discharge: Anticipated in 2-4 Days             Clayton Freire MD  Hospitalist Service  [facility]  Securely message with [employer] (more info)  Text page via [employer] Paging/Directory   ______________________________________________________________________    Interval History   -150. Denies palpitation. Feels breathing is improved. Edema in the legs also improved.     Physical Exam   Vital Signs: Temp: 97.8  F (36.6  C) Temp src: Oral BP: 117/73 Pulse: (!) 152   Resp: 18 SpO2: 95 % O2 Device: None (Room air) Oxygen Delivery: 2 LPM  Weight: 294 lbs 11.2 oz    General Appearance: Alert, awake and no apparent distress  Respiratory: clear to auscultation  Cardiovascular: tachycardic  GI: soft and non-tender  Skin: warm and dry      Medical Decision Making       MANAGEMENT DISCUSSED with the following over the past 24 hours: patient, RN   NOTE(S)/MEDICAL RECORDS " REVIEWED over the past 24 hours: nursing note, cardiology note, GI note  Tests ORDERED & REVIEWED in the past 24 hours:  - BMP  - CBC    X-ray of the hand report reviewed  Data     I have personally reviewed the following data over the past 24 hrs:    10.0  \   8.6 (L)   / 292     136 94 (L) 22.8 /  116 (H)   4.7 31 (H) 0.94 \       Imaging results reviewed over the past 24 hrs:   Recent Results (from the past 24 hours)   XR Hand Port Left G/E 3 Views    Narrative    EXAM: XR HAND PORT LEFT G/E 3 VIEWS  LOCATION: [facility]  DATE: 5/25/2025    INDICATION: Left hand and thumb pain.  COMPARISON: None.      Impression    IMPRESSION: Negative for fracture or dislocation. Moderate soft tissue swelling in the thumb. Moderate of osteoarthritis at the base of the thumb and the in CMC and STT joints, and throughout the IP joints with joint space narrowing and spurring.

## 2025-05-26 NOTE — PROGRESS NOTES
"Cardiology Progress Note  Marcello Benavides MD         Assessment and Plan:        Acute on chronic congestive heart failure with reduced ejection fraction, echo reveals EF of 35%.  Paroxysmal atrial flutter/fibrillation with rapid ventricular rate on amiodarone IV, previous cardioversion in the emergency room but now recurred.  Has received IV amiodarone since admission and will switch to p.o. now.  Marked anemia and liver cirrhosis with previous history of hepatitis C, treated, gastroenterology contemplating EGD on Tuesday    Discussion  Patient continues to be in atrial flutter at heart rate of 150 bpm.  Had cardioversion in the emergency room but has recurred.  Despite amiodarone, beta-blockers and digoxin, rate remains high.  However tolerating it well.  Will ask EP to see the patient tomorrow to consider ablation.  May need transesophageal echocardiogram prior to that.    Patient also has been seen by gastroenterology and they will do endoscopy tomorrow.  Once endoscopy is done, we can consider ablation with either OK support or ICE.    For now we will switch IV amiodarone to p.o.  Stop IV Lasix as she has diuresed well and will switch to torsemide 20 mg twice daily.    Plan discussed with the patient and nursing team.     Today's medical decision making is of high complexity.                     Interval History:     Shortness of breath and edema have improved.  Remains tachycardic.          Review of Systems:     The 5 point Review of Systems is negative other than noted in the HPI          Physical Exam:        Blood pressure 121/86, pulse (!) 151, temperature 97.8  F (36.6  C), temperature source Oral, resp. rate 18, height 1.626 m (5' 4\"), weight 133.7 kg (294 lb 11.2 oz), SpO2 97%.  Vitals:    05/25/25 0500 05/25/25 1505 05/26/25 0400   Weight: (!) 137.9 kg (304 lb) 133.3 kg (293 lb 12.8 oz) 133.7 kg (294 lb 11.2 oz)     Weights since admission  Vitals:    05/22/25 1751 05/23/25 0300 05/24/25 0552 " 05/25/25 0500   Weight: (!) 147.6 kg (325 lb 6.4 oz) (!) 145.9 kg (321 lb 9.6 oz) (!) 137.8 kg (303 lb 12.8 oz) (!) 137.9 kg (304 lb)    05/25/25 1505 05/26/25 0400   Weight: 133.3 kg (293 lb 12.8 oz) 133.7 kg (294 lb 11.2 oz)     Vital Signs with Ranges  Temp:  [97.8  F (36.6  C)-98.4  F (36.9  C)] 97.8  F (36.6  C)  Pulse:  [133-156] 151  Resp:  [18-20] 18  BP: ()/(62-87) 121/86  SpO2:  [92 %-98 %] 97 %  I/O's Last 24 hours  I/O last 3 completed shifts:  In: 2036 [P.O.:1490; I.V.:446; IV Piggyback:100]  Out: 2475 [Urine:2475]  Net I/O since admission  05/21 0700 - 05/26 0659  In: 6445.57 [P.O.:4630; I.V.:1425.57]  Out: 09726 [Urine:90065]  Net: -27642.43    EXAM:    1+ edema, tachycardic, chest clear to auscultation anteriorly.  Obese.         Medications:        Current Facility-Administered Medications   Medication Dose Route Frequency Provider Last Rate Last Admin    amiodarone (PACERONE) tablet 400 mg  400 mg Oral BID Marcello Benavides MD        cyanocobalamin (VITAMIN B-12) tablet 500 mcg  500 mcg Oral Daily Loki Anderson MD   500 mcg at 05/26/25 0741    doxycycline hyclate (VIBRAMYCIN) capsule 100 mg  100 mg Oral BID Loco Murray MD   100 mg at 05/26/25 0741    lamoTRIgine (LaMICtal) tablet 100 mg  100 mg Oral Daily Loco Murray MD   100 mg at 05/26/25 0741    metoprolol tartrate (LOPRESSOR) tablet 25 mg  25 mg Oral BID Dillan Cardoza MD   25 mg at 05/26/25 0741    nicotine (NICODERM CQ) 7 MG/24HR 24 hr patch 1 patch  1 patch Transdermal Daily Loki Anderson MD        pantoprazole (PROTONIX) IV push injection 40 mg  40 mg Intravenous BID Loki Anderson MD   40 mg at 05/26/25 0738    sodium chloride (PF) 0.9% PF flush 3 mL  3 mL Intracatheter Q8H [facility] Loco Murray MD   3 mL at 05/25/25 0630    torsemide (DEMADEX) tablet 20 mg  20 mg Oral BID Marcello Benavides MD                Data:      All new lab and imaging data was reviewed.   Recent Labs   Lab Test 05/26/25  0628  "25  0607 25  0718 25  1854 25  0601 25  1743   WBC 10.0  --   --   --  9.1 9.6   HGB 8.6* 8.8* 8.1*   < > 7.8* 8.0*   MCV 72* 71* 71*   < > 74* 72*     --   --   --  249 275    < > = values in this interval not displayed.      Recent Labs   Lab Test 25  0628 25  0022 25  1722 25  0607 25  0718     --   --  138 141   POTASSIUM 4.7 3.7 3.4 3.3* 3.5   CHLORIDE 94*  --   --  93* 98   CO2 31*  --   --  32* 32*   BUN 22.8  --   --  17.5 18.4   CR 0.94  --   --  0.81 0.83   ANIONGAP 11  --   --  13 11   KODI 8.8  --   --  8.6* 8.7*   *  --   --  121* 112*     No lab results found.    Invalid input(s): \"TROP\", \"TROPONINIES\"           Imaging:   Recent Results (from the past 24 hours)   Echocardiogram Complete   Result Value    LVEF  35%    Narrative    [id number]  [id number]  [id number]  [id number]^DORIAN^JOHANNA     [facility]  Echocardiography Laboratory  [address] [place] [address]     Name: FLAKITO MCCOY  MRN: [id number]  : 1965  Study Date: 2025 11:23 AM  Age: 60 yrs  Gender: Female  Patient Location: [facility]  Reason For Study: Atrial Flutter  Ordering Physician: JOHANNA ALARCON  Referring Physician: JOHANNA ALARCON  Performed By: Emperatriz Burt     BSA: 2.4 m2  Height: 64 in  Weight: 321 lb  HR: 74  BP: 139/77 mmHg  ______________________________________________________________________________  Procedure  Echocardiogram with two-dimensional, color and spectral Doppler.  ______________________________________________________________________________  Interpretation Summary     1. The left ventricle is moderately dilated. The visual ejection fraction is  estimated at 35%. There is moderate global hypokinesia of the left ventricle.  2. The right ventricle is mildly dilated. Mildly decreased right ventricular  systolic function  3. There is mild to moderate (1-2+) mitral regurgitation.  4. There is moderate (2+) " tricuspid regurgitation.  5. Mild (35-45mmHg) pulmonary hypertension is present. The right ventricular  systolic pressure is approximated at 41mmHg plus the right atrial pressure.     Echo 8/2024 from [facility] reported EF 55%.  ______________________________________________________________________________  Left Ventricle  The left ventricle is moderately dilated. The visual ejection fraction is  estimated at 35%. Left ventricular diastolic function is indeterminate. There  is moderate global hypokinesia of the left ventricle.     Right Ventricle  The right ventricle is mildly dilated. Mildly decreased right ventricular  systolic function.     Atria  The left atrium is severely dilated. The right atrium is moderately dilated.  There is no atrial shunt seen.     Mitral Valve  There is mild to moderate (1-2+) mitral regurgitation. Mitral mean 5mmHg, some  restriction of leaflet opening.     Tricuspid Valve  There is moderate (2+) tricuspid regurgitation. Mild (35-45mmHg) pulmonary  hypertension is present. The right ventricular systolic pressure is  approximated at 41mmHg plus the right atrial pressure.     Aortic Valve  The aortic valve is normal in structure and function.     Pulmonic Valve  The pulmonic valve is normal in structure and function.     Vessels  Normal ascending, transverse (arch), and descending aorta. Dilation of the  inferior vena cava is present with abnormal respiratory variation in diameter.     Pericardium  There is no pericardial effusion.     Rhythm  Sinus rhythm was noted.  ______________________________________________________________________________  MMode/2D Measurements & Calculations  IVSd: 1.3 cm     LVIDd: 5.8 cm  LVIDs: 4.1 cm  LVPWd: 0.96 cm  FS: 28.8 %  LV mass(C)d: 269.2 grams  LV mass(C)dI: 112.6 grams/m2  Ao root diam: 3.1 cm  asc Aorta Diam: 3.5 cm  LVOT diam: 1.9 cm  LVOT area: 2.9 cm2  Ao root diam index Ht(cm/m): 1.9  Ao root diam index BSA (cm/m2): 1.3  Asc Ao diam index BSA  (cm/m2): 1.5  Asc Ao diam index Ht(cm/m): 2.2  EF Biplane: 34.7 %  LA Volume (BP): 163.0 ml     LA Volume Index (BP): 68.2 ml/m2  RV Base: 4.2 cm  RWT: 0.33  TAPSE: 1.7 cm     Doppler Measurements & Calculations  MV E max yaya: 130.0 cm/sec  MV max P.9 mmHg  MV mean P.0 mmHg  MV V2 VTI: 41.7 cm  MVA(VTI): 1.3 cm2  MV dec time: 0.25 sec  Ao V2 max: 205.1 cm/sec  Ao max P.0 mmHg  Ao V2 mean: 138.8 cm/sec  Ao mean P.4 mmHg  Ao V2 VTI: 36.2 cm  GINA(I,D): 1.5 cm2  GINA(V,D): 1.3 cm2  LV V1 max PG: 3.6 mmHg  LV V1 max: 94.3 cm/sec  LV V1 VTI: 18.9 cm  SV(LVOT): 55.5 ml  SI(LVOT): 23.2 ml/m2  PA V2 max: 85.7 cm/sec  PA max P.9 mmHg  PA acc time: 0.10 sec  TR max yaya: 322.1 cm/sec  TR max P.5 mmHg     AV Yaya Ratio (DI): 0.46  GINA Index (cm2/m2): 0.64  E/E' avg: 15.7  Lateral E/e': 15.1  Medial E/e': 16.4     ______________________________________________________________________________  Report approved by: Jakob Wahl MD on 2025 12:26 PM

## 2025-05-26 NOTE — PROGRESS NOTES
GI Note    Chart reviewed. Pt with history of hep C, polysubstance abuse (recent cocaine) and cirrhosis with ongoing tachycardia / afib / flutter with RVR on Amiodarone. Less edematous after diuresis. EF 35%     Cardiology recommends repeat cardioversion (cardioverted in the ED), but pt needs OK first and EGD prior to that.     I will make her NPO after MN to reassess in AM. Anesthesia will have to review heart rate to see if it is safe to proceed.    Cheryl Toscano PA-C  [facility]

## 2025-05-26 NOTE — PROGRESS NOTES
A&O x4. VSS on room air. Tele A flutter/fib RVR. Intermittent c/o LE pains. Dyspneic on exertion. Denies CP/SOB/pain. Up A1. Heparin infusing. Plan for EP consult tomorrow, npo at 0000.     Heart Failure Care Map  GOALS TO BE MET BEFORE DISCHARGE:    1. Decrease congestion and/or edema with diuretic therapy to achieve near optimal volume status.     Dyspnea improved: No, further care required to meet this goal. Please explain improving, but pt still dyspneic on exertion    Edema improved: No, further care required to meet this goal. Please explain +1        Last 24 hour I/O:   Intake/Output Summary (Last 24 hours) at 5/26/2025 1814  Last data filed at 5/26/2025 1747  Gross per 24 hour   Intake 2235 ml   Output 4575 ml   Net -2340 ml           Net I/O and Weights since admission:   04/26 2300 - 05/26 2259  In: 7880.57 [P.O.:5980; I.V.:1510.57]  Out: 74384 [Urine:57846]  Net: -11461.43     Vitals:    05/22/25 1751 05/23/25 0300 05/24/25 0552 05/25/25 0500   Weight: (!) 147.6 kg (325 lb 6.4 oz) (!) 145.9 kg (321 lb 9.6 oz) (!) 137.8 kg (303 lb 12.8 oz) (!) 137.9 kg (304 lb)    05/25/25 1505 05/26/25 0400 05/26/25 1140   Weight: 133.3 kg (293 lb 12.8 oz) 133.7 kg (294 lb 11.2 oz) 132.8 kg (292 lb 11.2 oz)       2.  O2 sats > 90% on room air, or at prior home O2 therapy level.      Able to wean O2 this shift to keep sats above 90%?: Yes, satisfactory for discharge.   Does patient use Home O2? No          Current oxygenation status:   SpO2: 97 %     O2 Device: None (Room air),      3.  Tolerates ambulation and mobility near baseline.     Ambulation: No, further care required to meet this goal. Please explain Requires assistance for transfers, still WISE   Times patient ambulated with staff this shift: 2    Please review the Heart Failure Care Map for additional HF goal outcomes.    Yenifer Alanis, JHOAN  5/26/2025

## 2025-05-27 ENCOUNTER — APPOINTMENT (OUTPATIENT)
Dept: ULTRASOUND IMAGING | Facility: CLINIC | Age: 60
DRG: 291 | End: 2025-05-27
Attending: NURSE PRACTITIONER
Payer: COMMERCIAL

## 2025-05-27 ENCOUNTER — APPOINTMENT (OUTPATIENT)
Dept: CT IMAGING | Facility: CLINIC | Age: 60
DRG: 291 | End: 2025-05-27
Attending: INTERNAL MEDICINE
Payer: COMMERCIAL

## 2025-05-27 LAB
ANION GAP SERPL CALCULATED.3IONS-SCNC: 14 MMOL/L (ref 7–15)
ATRIAL RATE - MUSE: 146 BPM
ATRIAL RATE - MUSE: 151 BPM
BUN SERPL-MCNC: 29.1 MG/DL (ref 8–23)
CALCIUM SERPL-MCNC: 9.2 MG/DL (ref 8.8–10.4)
CHLORIDE SERPL-SCNC: 93 MMOL/L (ref 98–107)
CREAT SERPL-MCNC: 1.01 MG/DL (ref 0.51–0.95)
DIASTOLIC BLOOD PRESSURE - MUSE: NORMAL MMHG
DIASTOLIC BLOOD PRESSURE - MUSE: NORMAL MMHG
EGFRCR SERPLBLD CKD-EPI 2021: 63 ML/MIN/1.73M2
GLUCOSE SERPL-MCNC: 108 MG/DL (ref 70–99)
HCO3 SERPL-SCNC: 29 MMOL/L (ref 22–29)
HGB BLD-MCNC: 8.7 G/DL (ref 11.7–15.7)
HOLD SPECIMEN: NORMAL
INTERPRETATION ECG - MUSE: NORMAL
INTERPRETATION ECG - MUSE: NORMAL
MCV RBC AUTO: 76 FL (ref 78–100)
P AXIS - MUSE: 102 DEGREES
P AXIS - MUSE: 87 DEGREES
POTASSIUM SERPL-SCNC: 4.9 MMOL/L (ref 3.4–5.3)
POTASSIUM SERPL-SCNC: 4.9 MMOL/L (ref 3.4–5.3)
PR INTERVAL - MUSE: 116 MS
PR INTERVAL - MUSE: 126 MS
QRS DURATION - MUSE: 104 MS
QRS DURATION - MUSE: 88 MS
QT - MUSE: 258 MS
QT - MUSE: 272 MS
QTC - MUSE: 408 MS
QTC - MUSE: 423 MS
R AXIS - MUSE: -54 DEGREES
R AXIS - MUSE: -56 DEGREES
RADIOLOGIST FLAGS: ABNORMAL
SODIUM SERPL-SCNC: 136 MMOL/L (ref 135–145)
SYSTOLIC BLOOD PRESSURE - MUSE: NORMAL MMHG
SYSTOLIC BLOOD PRESSURE - MUSE: NORMAL MMHG
T AXIS - MUSE: 42 DEGREES
T AXIS - MUSE: 81 DEGREES
TROPONIN T SERPL HS-MCNC: 9 NG/L
UFH PPP CHRO-ACNC: 0.37 IU/ML (ref ?–1.1)
VENTRICULAR RATE- MUSE: 146 BPM
VENTRICULAR RATE- MUSE: 151 BPM

## 2025-05-27 PROCEDURE — 85520 HEPARIN ASSAY: CPT | Performed by: INTERNAL MEDICINE

## 2025-05-27 PROCEDURE — 93005 ELECTROCARDIOGRAM TRACING: CPT

## 2025-05-27 PROCEDURE — 250N000011 HC RX IP 250 OP 636: Performed by: INTERNAL MEDICINE

## 2025-05-27 PROCEDURE — 250N000009 HC RX 250: Performed by: PHYSICIAN ASSISTANT

## 2025-05-27 PROCEDURE — 80048 BASIC METABOLIC PNL TOTAL CA: CPT | Performed by: INTERNAL MEDICINE

## 2025-05-27 PROCEDURE — 85018 HEMOGLOBIN: CPT | Performed by: INTERNAL MEDICINE

## 2025-05-27 PROCEDURE — 250N000009 HC RX 250: Performed by: INTERNAL MEDICINE

## 2025-05-27 PROCEDURE — 210N000001 HC R&B IMCU HEART CARE

## 2025-05-27 PROCEDURE — 36415 COLL VENOUS BLD VENIPUNCTURE: CPT | Performed by: HOSPITALIST

## 2025-05-27 PROCEDURE — 84132 ASSAY OF SERUM POTASSIUM: CPT | Performed by: HOSPITALIST

## 2025-05-27 PROCEDURE — 250N000011 HC RX IP 250 OP 636: Mod: JZ | Performed by: INTERNAL MEDICINE

## 2025-05-27 PROCEDURE — 43235 EGD DIAGNOSTIC BRUSH WASH: CPT | Performed by: INTERNAL MEDICINE

## 2025-05-27 PROCEDURE — 250N000013 HC RX MED GY IP 250 OP 250 PS 637: Performed by: INTERNAL MEDICINE

## 2025-05-27 PROCEDURE — 93971 EXTREMITY STUDY: CPT | Mod: LT

## 2025-05-27 PROCEDURE — 36415 COLL VENOUS BLD VENIPUNCTURE: CPT | Performed by: PHYSICIAN ASSISTANT

## 2025-05-27 PROCEDURE — 84484 ASSAY OF TROPONIN QUANT: CPT | Performed by: PHYSICIAN ASSISTANT

## 2025-05-27 PROCEDURE — 99223 1ST HOSP IP/OBS HIGH 75: CPT | Performed by: INTERNAL MEDICINE

## 2025-05-27 PROCEDURE — 258N000003 HC RX IP 258 OP 636: Performed by: PHYSICIAN ASSISTANT

## 2025-05-27 PROCEDURE — 250N000013 HC RX MED GY IP 250 OP 250 PS 637

## 2025-05-27 PROCEDURE — 250N000013 HC RX MED GY IP 250 OP 250 PS 637: Performed by: HOSPITALIST

## 2025-05-27 PROCEDURE — 71260 CT THORAX DX C+: CPT

## 2025-05-27 PROCEDURE — 250N000011 HC RX IP 250 OP 636

## 2025-05-27 PROCEDURE — 99233 SBSQ HOSP IP/OBS HIGH 50: CPT | Performed by: INTERNAL MEDICINE

## 2025-05-27 RX ORDER — IOPAMIDOL 755 MG/ML
135 INJECTION, SOLUTION INTRAVASCULAR ONCE
Status: COMPLETED | OUTPATIENT
Start: 2025-05-27 | End: 2025-05-27

## 2025-05-27 RX ORDER — PANTOPRAZOLE SODIUM 40 MG/1
40 TABLET, DELAYED RELEASE ORAL
Status: DISCONTINUED | OUTPATIENT
Start: 2025-05-27 | End: 2025-06-02 | Stop reason: HOSPADM

## 2025-05-27 RX ADMIN — PANTOPRAZOLE SODIUM 40 MG: 40 TABLET, DELAYED RELEASE ORAL at 08:33

## 2025-05-27 RX ADMIN — SODIUM CHLORIDE 80 ML: 9 INJECTION, SOLUTION INTRAVENOUS at 10:31

## 2025-05-27 RX ADMIN — DOXYCYCLINE HYCLATE 100 MG: 100 CAPSULE ORAL at 08:24

## 2025-05-27 RX ADMIN — CALCIUM CARBONATE (ANTACID) CHEW TAB 500 MG 1000 MG: 500 CHEW TAB at 03:48

## 2025-05-27 RX ADMIN — PANTOPRAZOLE SODIUM 40 MG: 40 TABLET, DELAYED RELEASE ORAL at 16:22

## 2025-05-27 RX ADMIN — METOPROLOL TARTRATE 25 MG: 25 TABLET, FILM COATED ORAL at 08:24

## 2025-05-27 RX ADMIN — MORPHINE SULFATE 2 MG: 2 INJECTION, SOLUTION INTRAMUSCULAR; INTRAVENOUS at 03:48

## 2025-05-27 RX ADMIN — AMIODARONE HYDROCHLORIDE 400 MG: 200 TABLET ORAL at 20:13

## 2025-05-27 RX ADMIN — MORPHINE SULFATE 2 MG: 2 INJECTION, SOLUTION INTRAMUSCULAR; INTRAVENOUS at 20:13

## 2025-05-27 RX ADMIN — MORPHINE SULFATE 2 MG: 2 INJECTION, SOLUTION INTRAMUSCULAR; INTRAVENOUS at 08:29

## 2025-05-27 RX ADMIN — DILTIAZEM HYDROCHLORIDE 5 MG/HR: 5 INJECTION INTRAVENOUS at 16:23

## 2025-05-27 RX ADMIN — ACETAMINOPHEN 975 MG: 325 TABLET, FILM COATED ORAL at 00:41

## 2025-05-27 RX ADMIN — DILTIAZEM HYDROCHLORIDE 5 MG/HR: 5 INJECTION INTRAVENOUS at 11:11

## 2025-05-27 RX ADMIN — AMIODARONE HYDROCHLORIDE 400 MG: 200 TABLET ORAL at 08:24

## 2025-05-27 RX ADMIN — HEPARIN SODIUM 1800 UNITS/HR: 10000 INJECTION, SOLUTION INTRAVENOUS at 13:19

## 2025-05-27 RX ADMIN — CYANOCOBALAMIN TAB 500 MCG 500 MCG: 500 TAB at 08:24

## 2025-05-27 RX ADMIN — ACETAMINOPHEN 975 MG: 325 TABLET, FILM COATED ORAL at 13:12

## 2025-05-27 RX ADMIN — OXYCODONE HYDROCHLORIDE 5 MG: 5 TABLET ORAL at 13:12

## 2025-05-27 RX ADMIN — MORPHINE SULFATE 2 MG: 2 INJECTION, SOLUTION INTRAMUSCULAR; INTRAVENOUS at 00:41

## 2025-05-27 RX ADMIN — LAMOTRIGINE 100 MG: 100 TABLET ORAL at 08:24

## 2025-05-27 RX ADMIN — TORSEMIDE 20 MG: 20 TABLET ORAL at 08:24

## 2025-05-27 RX ADMIN — DOXYCYCLINE HYCLATE 100 MG: 100 CAPSULE ORAL at 20:13

## 2025-05-27 RX ADMIN — IOPAMIDOL 135 ML: 755 INJECTION, SOLUTION INTRAVENOUS at 10:30

## 2025-05-27 ASSESSMENT — ACTIVITIES OF DAILY LIVING (ADL)
ADLS_ACUITY_SCORE: 52

## 2025-05-27 NOTE — PLAN OF CARE
Care provided [id number]  Neuro: A&Ox4, frustrated with care this evening  Tele/Cardiac: A-flutter RVR 150s. Notified cardiology about 4/10 midsternal chest pain. Morphine given per recommendation.   Resp: WISE, LS diminished, RA  Activity: A1 gb/walker  Pain: chronic leg pain reported, PRN oxycodone given with relief. 4/10 chest pain reported PRN morphine given.  Drips/IV: heparin 1800 unit/hr  GI/: purewick in place.   Skin: blanchable redness on both forearms. Rash under breasts and groin  Diet: Diet: Fluid restriction 2000 ML FLUID  NPO for Procedure/Surgery per Anesthesia Guidelines Except for: Meds; Clear liquids before procedure/surgery: ADULT (Age GREATER than or Equal to 18 years) - Clear liquids 2 hours before procedure/surgery     Test/Procedures: awaiting ultrasound of L arm. Need ablation/ cardioversion, EGD, and angio  Plan: plan of care ongoing.

## 2025-05-27 NOTE — PROGRESS NOTES
GI    EGD planned today for ALEX    On heparin for a flutter, however rate remains in the 140s.  Will cancel plan for EGD today.  Hgb stable. Will follow.     Galen Garvin DO   [facility] - [facility]  Office [phone]

## 2025-05-27 NOTE — PROGRESS NOTES
House MEGG[facility] brief note:    LUE erythema, edema suspect 2/2 recent extravasation of IV protonix (5/22/25), phlebitis.  Will rule out thrombophlebitis, superficial thrombus, DVT    Contacted by nursing at 2035 noting concern for worsening erythema and edema of LUE, at site of recent extravasation of IV protonix on 5/22/25.  Nursing notes erythema and edema has worsening since last night.  Presented to pt's bedside.  At time of arrival, pt lying in bed, awake, alert, watching TV and on her cell phone.  Pt reports mild tenderness to L AC region.  Noted ~ 2 in by 2 in firmness just proximal of L AC PIV insertion site; remaining areas of redness are soft.  No obvious skin breakdown, abscess or concern for tissue necrosis at this time.  Nursing has outlined erythema.  Pt reports no numbness, tingling and has +3 L radial pulse; able to complete LUE ROM.      Interventions:  - Will order LUE venous US  - Pt continues on IV heparin  - After review of extravasation protocol, no antidote for protonix  - Continue with supportive cares: warm pack, elevate  - No concerns for infection/cellulitis at this time  - May need to consult WOC pending imaging and overall progression of erythema/edema    DYLON Fraire, CNP  [place] [facility]    Medical Decision Making       10 MINUTES SPENT BY ME on the date of service doing chart review, history, exam, documentation & further activities per the note.

## 2025-05-27 NOTE — PROGRESS NOTES
"[facility] Progress Note     IMPRESSION:  60 year old female with PMH significant for compensated liver cirrhosis (2/2 eradicated Hep C), a flutter, CHF, s/p gastric bypass, and history of MRSA.  Past surgical history also includes cholecystectomy and thyroidectomy.    GI was consulted for iron deficiency anemia.    -Ferritin level normal at 18 on   -Hgb remains stable, 8.7 this am (has been mid 8 range from several days)  -remains in a fib with RVR and on a heparin drip for this--> no plans for EGD today  -Cardiology may be planning on a ablation today      RECOMMENDATIONS:  -continue to trend Hgb    -oral iron supplementation recommended    -we will continue to follow to see if we can facilitate in EGD while inpatient    -appreciate cardiology's recommendations and in following this patient      20 minutes of total time was spent providing patient care, including patient evaluation, reviewing documentation/test results, and .  Discussed with Dr. Garvin.   Tashia Paige, ASHKAN  [facility]  [phone]  ________________________________________________________________________      SUBJECTIVE:    Patient and bedside nurse deny any melena, nausea, or vomting.  No abdominal pain.  Patient is hungry.       OBJECTIVE:  /78   Pulse (!) 144   Temp 98.4  F (36.9  C) (Oral)   Resp 16   Ht 1.626 m (5' 4\")   Wt 132.8 kg (292 lb 11.2 oz)   SpO2 96%   BMI 50.24 kg/m    Temp (24hrs), Av.2  F (36.8  C), Min:97.9  F (36.6  C), Max:98.4  F (36.9  C)    Patient Vitals for the past 72 hrs:   Weight   25 1140 132.8 kg (292 lb 11.2 oz)   25 0400 133.7 kg (294 lb 11.2 oz)   25 1505 133.3 kg (293 lb 12.8 oz)   25 0500 (!) 137.9 kg (304 lb)       Intake/Output Summary (Last 24 hours) at 2025 0813  Last data filed at 2025 0600  Gross per 24 hour   Intake 985 ml   Output 3300 ml   Net -2315 ml        PHYSICAL EXAM  GEN: Alert, oriented x3, communicative " and in NAD.    HRT: Rate irregular, tachycardic   LUNGS: CTA  ABD: , ND, +BS, no guarding or pain to palpation, no rebound, no HSM.  SKIN: No rash, jaundice or spider angiomata    LABS:  I have reviewed the patient's new clinical lab results:     Recent Labs   Lab Test 05/27/25  0528 05/26/25  0628 05/25/25  0607 05/23/25  1854 05/23/25  0601 05/22/25  1743   WBC  --  10.0  --   --  9.1 9.6   HGB 8.7* 8.6* 8.8*   < > 7.8* 8.0*   MCV 76* 72* 71*   < > 74* 72*   PLT  --  292  --   --  249 275    < > = values in this interval not displayed.     Recent Labs   Lab Test 05/27/25  0528 05/26/25  0628 05/26/25  0022 05/25/25  1722 05/25/25  0607   POTASSIUM 4.9  4.9 4.7 3.7   < > 3.3*   CHLORIDE 93* 94*  --   --  93*   CO2 29 31*  --   --  32*   BUN 29.1* 22.8  --   --  17.5   CR 1.01* 0.94  --   --  0.81   ANIONGAP 14 11  --   --  13    < > = values in this interval not displayed.     Recent Labs   Lab Test 05/23/25  1137 05/22/25  1743 05/15/24  1941 02/01/24  1301   ALBUMIN  --  3.7 4.0  --    BILITOTAL  --  0.2 0.2  --    ALT  --  12 28  --    AST  --  15 18  --    PROTEIN Negative  --   --  Negative         IMAGING:  No gastrointestinal related imaging this admission

## 2025-05-27 NOTE — CONSULTS
[facility] electrophysiology Consult Note  Date of Service: 05/27/2025    Primary Cardiologist: Will be Dr. Nani Avinahoward Malone is a 60 year old female with PMH including liver cirrhosis hepatitis C, polysubstance abuse (recent cocaine), MEREDITH, hypothyroidism, hypertension, chronic anemia residing in a group home who was admitted on 5/22/2025 with rapid 2-1 atrial flutter requiring cardioversion in the ED with recurrence, subsequently started on IV to oral amiodarone and remains rapid despite this as well as low-dose metoprolol, 1 dose of IV digoxin given 5/24 (rate limiting medications limited by hypotension).  EF reduced on echocardiogram at 35% significantly volume overloaded requiring diuresis.  She is not a good long-term amiodarone candidate with cirrhosis.  EP is being asked to consider inpatient atrial flutter ablation.      Of note, EGD was planned for today for evaluation of anemia with a hemoglobin around 8, no overt signs of bleeding, however this has been deferred for rapid HR's.     She did have acute onset pinpoint L chest sharp pain radiating to her back beginning last evening around 10p, which persists today and worsens with deep breathing or coughing. Morphine helps. Hgb stable.    Assessment:  Newly diagnosed atrial flutter, 's, reversion following DCCV in ED, persistent despite IV --> oral amio (oral since 5/26). On heparin gtt.   She did have one EKG (5/23) showing AF but AFL appears to be her primary arrhythmia.   New but stable anemia with hgb ~8, concern for esophageal issues given cirrhosis, EGD deferred in the setting of above.   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  Acute chest pain, radiating to back, with pleuritic component. Low suspicion for ACS as well as PE (on heparin since 5/23).    Plan:   CT to rule out aortic dissection.   Start  low dose diltiazem gtt without bolus (goal HR < 120 bpm, maintain SBP > 90 mmHg), hold oral metoprolol, continue oral amiodarone and heparin.   AFL ablation with ICE tomorrow.   Pt understands / accepts the risks that anticoagulation discontinuation without 30 days of procedure will place her at higher risk for stroke.   We discussed the risks, benefits and indications of proceeding with an electrophysiology study and atrial flutter ablation including but not limited to use of anesthesia, peripheral vessel injury, discomfort, bruising, bleeding, esophageal injury, diaphragmatic injury, cardiac puncture and/or tamponade requiring emergency treatment, pulmonary vein stenosis requiring intervention, and stroke/TIA. We reviewed that additional procedures may be required.  We also briefly discussed post-procedural restrictions and post-procedural discomfort.  The patient voiced understanding and is willing to proceed.  A consent form will be signed by the procedural physician.      Plan was formulated under direction of Dr. Jmaa.   Merlyn Lebron PA-C  [facility] ER page  __________________________________________________________________________  Physical Exam   Temp: 98.4  F (36.9  C) Temp src: Oral BP: 99/79 Pulse: (!) 144   Resp: 16 SpO2: 96 % O2 Device: Nasal cannula Oxygen Delivery: 2 LPM  Vitals:    05/25/25 1505 05/26/25 0400 05/26/25 1140   Weight: 133.3 kg (293 lb 12.8 oz) 133.7 kg (294 lb 11.2 oz) 132.8 kg (292 lb 11.2 oz)       GENERAL:  The patient is in no apparent distress.   NECK: CVP difficult to assess.  PULMONARY:  There is a normal respiratory effort. Wheezing noted, no crackles.  CARDIOVASCULAR:  Regular and rapid.  EXTREMITIES:  1-2+ pitting edema bilateral lower extremities.     Medications   Current Facility-Administered Medications   Medication Dose Route Frequency Provider Last Rate Last Admin    heparin 25,000 units in 0.45% NaCl 250 mL ANTICOAGULANT infusion   0-5,000 Units/hr Intravenous Continuous Loco Murray MD 18 mL/hr at 05/27/25 0342 1,800 Units/hr at 05/27/25 0342    Patient is already receiving anticoagulation with heparin, enoxaparin (LOVENOX), warfarin (COUMADIN)  or other anticoagulant medication   Does not apply Continuous PRN Loco Murray MD         Current Facility-Administered Medications   Medication Dose Route Frequency Provider Last Rate Last Admin    amiodarone (PACERONE) tablet 400 mg  400 mg Oral BID Marcello Benavides MD   400 mg at 05/26/25 2010    cyanocobalamin (VITAMIN B-12) tablet 500 mcg  500 mcg Oral Daily Loki Anderson MD   500 mcg at 05/26/25 0741    doxycycline hyclate (VIBRAMYCIN) capsule 100 mg  100 mg Oral BID Loco Murray MD   100 mg at 05/26/25 2009    lamoTRIgine (LaMICtal) tablet 100 mg  100 mg Oral Daily Loco Murray MD   100 mg at 05/26/25 0741    metoprolol tartrate (LOPRESSOR) tablet 25 mg  25 mg Oral BID Dillan Cardoza MD   25 mg at 05/26/25 2009    nicotine (NICODERM CQ) 7 MG/24HR 24 hr patch 1 patch  1 patch Transdermal Daily Loki Anderson MD        pantoprazole (PROTONIX) EC tablet 40 mg  40 mg Oral BID AC Clayton Freire MD        sodium chloride (PF) 0.9% PF flush 3 mL  3 mL Intracatheter Q8H [facility] Loco Murray MD   3 mL at 05/27/25 0348    torsemide (DEMADEX) tablet 20 mg  20 mg Oral BID Marcello Benavides MD   20 mg at 05/26/25 2009       Data   Most Recent 3 CBC's:  Recent Labs   Lab Test 05/27/25  0528 05/26/25  0628 05/25/25  0607 05/23/25  1854 05/23/25  0601 05/22/25  1743   WBC  --  10.0  --   --  9.1 9.6   HGB 8.7* 8.6* 8.8*   < > 7.8* 8.0*   MCV 76* 72* 71*   < > 74* 72*   PLT  --  292  --   --  249 275    < > = values in this interval not displayed.     Most Recent 3 BMP's:  Recent Labs   Lab Test 05/27/25  0528 05/26/25  0628 05/26/25  0022 05/25/25  1722 05/25/25  0607    136  --   --  138   POTASSIUM 4.9  4.9 4.7 3.7   < > 3.3*   CHLORIDE 93* 94*  --   --  93*   CO2 29 31*   --   --  32*   BUN 29.1* 22.8  --   --  17.5   CR 1.01* 0.94  --   --  0.81   ANIONGAP 14 11  --   --  13   KODI 9.2 8.8  --   --  8.6*   * 116*  --   --  121*    < > = values in this interval not displayed.     Most Recent 2 LFT's:  Recent Labs   Lab Test 05/22/25  1743 05/15/24  1941   AST 15 18   ALT 12 28   ALKPHOS 124 98   BILITOTAL 0.2 0.2     Most Recent 3 BNP's:  Recent Labs   Lab Test 05/22/25 1743   NTBNP 1,648*     Most Recent TSH and T4:  Recent Labs   Lab Test 05/22/25 1743   TSH 2.06

## 2025-05-27 NOTE — PROVIDER NOTIFICATION
Pt c/o ongoing  mid sternal chest pain 4/10, EKG showes A-flutter 'S, prn morphine given. Cardiology and EP updated.

## 2025-05-27 NOTE — PROGRESS NOTES
[facility]    Medicine Progress Note - Hospitalist Service    Date of Admission:  5/22/2025    Assessment & Plan   Omayra Malone is a 60 year old female with history of hypertension, hepatitis C, liver cirrhosis, chronic anemia, obesity status post gastric bypass, bipolar affective disorder, MEREDITH, hypothyroidism, osteoarthritis admitted on 5/22/2025 for shortness of breath, palpitations.     Acute heart failure with reduced EF of 35%, decompensated  Atrial flutter with RVR-status post DCCV on 5/22, flipped to normal sinus rhythm, now in A flutter  Acute hypoxic respiratory failure multifactorial from heart failure exacerbation, atrial flutter, Anemia, obstructive sleep apnea, OHS, deconditioning.  Moderate tricuspid regurgitation.  Mild pulmonary hypertension.  Mild to moderate MR.  Hypertension.  *Patient resident of [facility], presented with shortness of breath and palpitations.  Patient initially denied drug use,  urine positive for cocaine.  Reports recent use.  *On EMS arrival the patient was found to be in SVT at a pulse of 176. En route to the ED they attempted 6mg Adenosine, followed shortly after by another 12 mg with no success. -SpO2 was 94% en route to the ED. in ED heart rate in 170s, blood pressure in 120s systolic.  Sodium 141, potassium 3.9, creatinine 0.62.  *WBC 9.6, TSH 2.06.  N-terminal proBNP 1648.  Troponin high-sensitivity 12 >1  *Chest x-ray no definitive evidence for pulmonary edema, no concerns for infection.  *EKG sinus tachycardia, left axis deviation, ST-T wave changes.  *Echocardiogram with LVEF of 35%, moderate global hypokinesis of the left ventricle.  Right ventricle  mildly dilated.  Moderate mitral regurgitation, moderate tricuspid regurgitation.  Mild pulmonary hypertension.  *5/23 - was on intravenous heparin drip, initiated IV Lasix drip for diuresis.  *5/24 --in A-fib/flutter with rates in 140s to 150s.  Initiated intravenous amiodarone drip.      -  rates remain in 140s-150 range  - Amio drip transition to PO amiodarone on 5/26/2025  - Continue intravenous heparin drip.  - Lasix drip-->> Torsemide 20mg BID on 5/26/2025, BUN/Cr slightly going up on 5/27, has gotten am dose, will hold PM dose and reassess on 5/28. Discussed with Dr. Benavides  - Continue scheduled oral metoprolol 25 mg twice daily.  IV metoprolol per cardiology.  - Hold prior to admission amlodipine, hydrochlorothiazide to allow room for diuresis, beta-blocker.    - Continue telemetry monitoring.  - Intake output monitoring, daily weights  - General cardiology following, appreciate help  - EP evaluating today for possible ablation       Hypokalemia, hypomagnesemia.  Likely from diuresis.  Potassium 3.3, magnesium 1.4.  Keep potassium around 4, magnesium around 2, replacement protocols ordered     Acute on chronic anemia -likely dilutional, rule out blood loss.  Microcytic anemia, severe iron deficiency.  Vitamin B12 deficiency.  Hemoglobin 8.0 from previous 11.8 1-year ago, patient denies bleeding or melena. Does have history of gastric bypass does not appear to be on B12.    *Iron saturation index 4, vitamin B12 150.  CK29.  Folate 16.7.  UA no hematuria.  *IV Venofer for 2 doses -5/23, 5/24.   -Initiated on oral iron, vitamin B12 supplements -to continue.  - change IV PPI to PO PPI  -Consent for blood transfusion obtained, transfuse for hemoglobin less than 8 or symptomatic.    -[facility] following, appreciate help. EGD cancelled on 5/27 due to ongoing flutter w/rvr     Cirrhosis secondary to Hep C and NAFLD  Noted- per chart review, [facility] 2021 well-compensated cirrhosis due to Hep C (treated) and NAFLD.   IV pantoprazole as above.     Left hand pain  Reports sudden pain while holding something a few weeks back.  Patient has been receiving diuresis with intravenous Lasix, significant improvement in swelling, erythema.  Continues to complain of pain.    Afebrile, no leukocytosis CRP 4.74,  .  X-ray left hand / thumb (5/25)-Negative for fracture or dislocation. Moderate soft tissue swelling in the thumb. Moderate of osteoarthritis at the base of the thumb and the in CMC and STT joints, and throughout the IP joints with joint space narrowing and spurring.   Monitor closely.      LUE erythema, edema likely secondary to recent extravasation of IV Protonix  *House MEGGAN evaluated on 5/26 evening, refer to note for detail  *this is outlined and remains with in the marking. Erythema  - LUE US pending  - elevation, warm compresses    Addendum:  LUE US:IMPRESSION:   1.  Positive for cephalic vein thrombus at the distal humerus and antecubital fossa spanning greater than 5 cm.  2.  No deep venous thrombosis in the left upper extremity.    Supportive measures-elevation, warm compresses. monitor     Cocaine use.  Patient initially denied drug use, group home staff reported concern for cocaine use.  Urine drug screen positive for cocaine.  Patient reports recent use -with her boyfriend.  Emphasized need to consider abstinence, will need chemical dependency evaluation once closer to discharge.      Acute on chronic anxiety.  Bipolar affective disorder  Continue PTA lamictal 100mg daily  Continue PTA xanax PRN at reduce dose (does not take when she is prescribed oxycodone which she currently is for leg pain)  As needed Atarax 4 times a day     Chronic leg and back pain   Follows with [facility] pain clinic, chronically on oxycodone.  Continue PTA oxycodone 5mg q8prn, minimize narcotic use as able to.     History of hypothyroidism.  PTA not on meds.  TSH within normal limits     Medically complicated obesity s/p gastric bypass  Body mass index is 55.85 kg/m . Recommend outpatient follow up/ongoing weight loss.      MEREDITH  Does not use CPAP.  Consider sleep studies as outpatient.     Chronic doxycycline use  No clear indication on chart review, reports chronically used.  Continue for now      Physical deconditioning  "from medical illness.  PT, OT evaluation prior to discharge.  Fall precautions.  Encourage ambulation out of bed as able to.     Nicotine use.  Reports intermittent vaping.  Emphasized need to consider abstinence, nicotine patch ordered          Diet: Fluid restriction 2000 ML FLUID  NPO for Procedure/Surgery per Anesthesia Guidelines Except for: Meds; Clear liquids before procedure/surgery: ADULT (Age GREATER than or Equal to 18 years) - Clear liquids 2 hours before procedure/surgery    DVT Prophylaxis: Heparin   Stafford Catheter: Not present  Lines: None     Cardiac Monitoring: ACTIVE order. Indication: Tachyarrhythmias, acute (48 hours)  Code Status: Full Code      Clinically Significant Risk Factors          # Hypochloremia: Lowest Cl = 93 mmol/L in last 2 days, will monitor as appropriate             # Acute heart failure with reduced ejection fraction: last echo with EF <40% and receiving IV diuretics           # Morbid Obesity: Estimated body mass index is 50.24 kg/m  as calculated from the following:    Height as of this encounter: 1.626 m (5' 4\").    Weight as of this encounter: 132.8 kg (292 lb 11.2 oz).        # Financial/Environmental Concerns: none         Social Drivers of Health    Tobacco Use: High Risk (1/9/2025)    Received from [facility]    Patient History     Smoking Tobacco Use: Some Days     Smokeless Tobacco Use: Former          Disposition Plan     Medically Ready for Discharge: Anticipated in 2-4 Days             Clayton Freire MD  Hospitalist Service  [facility]  Securely message with [employer] (more info)  Text page via [facility] Paging/Directory   ______________________________________________________________________    Interval History   Reports chest pain, sharp in nature, worsens with coughing and deep breathing. Started last night. Morphine helped. It is reproducible during exam.   Feels breathing is stable.    Physical Exam "   Vital Signs: Temp: 98.4  F (36.9  C) Temp src: Oral BP: 104/78 Pulse: (!) 144   Resp: 16 SpO2: 96 % O2 Device: Nasal cannula Oxygen Delivery: 2 LPM  Weight: 292 lbs 11.2 oz    General Appearance: Alert, awake and no apparent distress  Respiratory: clear to auscultation  Cardiovascular: tachycardic  GI: soft and non-tender  Skin: warm and dry      Medical Decision Making       MANAGEMENT DISCUSSED with the following over the past 24 hours: patient, RN   NOTE(S)/MEDICAL RECORDS REVIEWED over the past 24 hours: nursing note, cardiology note, GI note  Tests ORDERED & REVIEWED in the past 24 hours:  - BMP  - CBC          Data     I have personally reviewed the following data over the past 24 hrs:    N/A  \   8.7 (L)   / N/A     136 93 (L) 29.1 (H) /  108 (H)   4.9; 4.9 29 1.01 (H) \       Imaging results reviewed over the past 24 hrs:   No results found for this or any previous visit (from the past 24 hours).

## 2025-05-27 NOTE — PLAN OF CARE
Goal Outcome Evaluation:  Neuro- A&OX4,   Most Recent Vitals- Temp: 98.1  F (36.7  C) Temp src: Oral BP: 96/72 Pulse: (!) 144   Resp: 16 SpO2: (!) 88 % O2 Device: None (Room air)   Tele/Cardiac- A-flutter 'S   Resp- RA when awake, 2L with naps and sleep  Activity- assist of 1, gait belt and walker   Pain- c/o chest and lower extremity pain, prn morphine, oxycodone and Tylenol given with good effect   Drips- Heparin gtt 1800u/hr 10 due in am of 5/28  Drains/Tubes- P-IVX2   Skin- Intact   GI/-  WDL, pure wick   Aggression Color- Green  COVID status- Negative  Plan- a-fib/flutter ablation tomorrow 5/28.  [facility]-     Isabel Mcleod RN

## 2025-05-27 NOTE — PLAN OF CARE
Heart Failure Care Map  GOALS TO BE MET BEFORE DISCHARGE:    1. Decrease congestion and/or edema with diuretic therapy to achieve near optimal volume status.     Dyspnea improved: No, further care required to meet this goal. Please explain WISE while in bed   Edema improved: No, further care required to meet this goal. Please explain 1+ edema        Last 24 hour I/O:   Intake/Output Summary (Last 24 hours) at 5/27/2025 0734  Last data filed at 5/27/2025 0600  Gross per 24 hour   Intake 1435 ml   Output 3300 ml   Net -1865 ml           Net I/O and Weights since admission:   04/27 1500 - 05/27 1459  In: 7880.57 [P.O.:5980; I.V.:1510.57]  Out: 80603 [Urine:74012]  Net: -75686.43     Vitals:    05/22/25 1751 05/23/25 0300 05/24/25 0552 05/25/25 0500   Weight: (!) 147.6 kg (325 lb 6.4 oz) (!) 145.9 kg (321 lb 9.6 oz) (!) 137.8 kg (303 lb 12.8 oz) (!) 137.9 kg (304 lb)    05/25/25 1505 05/26/25 0400 05/26/25 1140   Weight: 133.3 kg (293 lb 12.8 oz) 133.7 kg (294 lb 11.2 oz) 132.8 kg (292 lb 11.2 oz)       2.  O2 sats > 90% on room air, or at prior home O2 therapy level.      Able to wean O2 this shift to keep sats above 90%?: No, further care required to meet this goal. Please explain Pt requires O2 per NC for sleep   Does patient use Home O2? No          Current oxygenation status:   SpO2: 96 %     O2 Device: Nasal cannula, Oxygen Delivery: 2 LPM    3.  Tolerates ambulation and mobility near baseline.     Ambulation: No, further care required to meet this goal. Please explain BR   Times patient ambulated with staff this shift: 0    Please review the Heart Failure Care Map for additional HF goal outcomes.    Pt continues to have AFlutter RVR with HR up to the 140's, other VSS.  She continues on a Heparin gtt with no signs of bleeding.  Pt also c/o chest pain with deep inspiration and coughing which was treated with PRN med's and  by AM is pain free.  Her EGD is on hold and will be seen by [facility]/EP today.  Plan of care is on  going.  Repeat RFA post infiltration photo done.      Rohan Ng RN  5/27/2025

## 2025-05-27 NOTE — PROVIDER NOTIFICATION
Text paged cardiology Brandon with the following, dilt gtt on hold due to BP 70'S and 80'S. 's, pt reports chest pain resolved with oxycodone for now. will re-start when BP allows.

## 2025-05-28 LAB
ANION GAP SERPL CALCULATED.3IONS-SCNC: 12 MMOL/L (ref 7–15)
BUN SERPL-MCNC: 27.9 MG/DL (ref 8–23)
CALCIUM SERPL-MCNC: 9.4 MG/DL (ref 8.8–10.4)
CHLORIDE SERPL-SCNC: 95 MMOL/L (ref 98–107)
CREAT SERPL-MCNC: 0.98 MG/DL (ref 0.51–0.95)
EGFRCR SERPLBLD CKD-EPI 2021: 65 ML/MIN/1.73M2
ERYTHROCYTE [DISTWIDTH] IN BLOOD BY AUTOMATED COUNT: 22.2 % (ref 10–15)
GLUCOSE SERPL-MCNC: 118 MG/DL (ref 70–99)
HCG INTACT+B SERPL-ACNC: 1 MIU/ML
HCO3 SERPL-SCNC: 31 MMOL/L (ref 22–29)
HCT VFR BLD AUTO: 28.8 % (ref 35–47)
HGB BLD-MCNC: 8.7 G/DL (ref 11.7–15.7)
INR PPP: 1.18 (ref 0.85–1.15)
MAGNESIUM SERPL-MCNC: 2.1 MG/DL (ref 1.7–2.3)
MCH RBC QN AUTO: 22.4 PG (ref 26.5–33)
MCHC RBC AUTO-ENTMCNC: 30.2 G/DL (ref 31.5–36.5)
MCV RBC AUTO: 74 FL (ref 78–100)
PLATELET # BLD AUTO: 269 10E3/UL (ref 150–450)
POTASSIUM SERPL-SCNC: 4.6 MMOL/L (ref 3.4–5.3)
PROTHROMBIN TIME: 14.7 SECONDS (ref 11.8–14.8)
RBC # BLD AUTO: 3.89 10E6/UL (ref 3.8–5.2)
SODIUM SERPL-SCNC: 138 MMOL/L (ref 135–145)
UFH PPP CHRO-ACNC: 0.47 IU/ML (ref ?–1.1)
WBC # BLD AUTO: 6.8 10E3/UL (ref 4–11)

## 2025-05-28 PROCEDURE — 250N000009 HC RX 250: Performed by: INTERNAL MEDICINE

## 2025-05-28 PROCEDURE — 210N000001 HC R&B IMCU HEART CARE

## 2025-05-28 PROCEDURE — 83735 ASSAY OF MAGNESIUM: CPT | Performed by: INTERNAL MEDICINE

## 2025-05-28 PROCEDURE — 84702 CHORIONIC GONADOTROPIN TEST: CPT | Performed by: PHYSICIAN ASSISTANT

## 2025-05-28 PROCEDURE — 250N000013 HC RX MED GY IP 250 OP 250 PS 637: Performed by: INTERNAL MEDICINE

## 2025-05-28 PROCEDURE — 85610 PROTHROMBIN TIME: CPT | Performed by: PHYSICIAN ASSISTANT

## 2025-05-28 PROCEDURE — 82947 ASSAY GLUCOSE BLOOD QUANT: CPT | Performed by: PHYSICIAN ASSISTANT

## 2025-05-28 PROCEDURE — 99233 SBSQ HOSP IP/OBS HIGH 50: CPT | Performed by: INTERNAL MEDICINE

## 2025-05-28 PROCEDURE — 85520 HEPARIN ASSAY: CPT | Performed by: INTERNAL MEDICINE

## 2025-05-28 PROCEDURE — 250N000013 HC RX MED GY IP 250 OP 250 PS 637: Performed by: HOSPITALIST

## 2025-05-28 PROCEDURE — 85014 HEMATOCRIT: CPT | Performed by: PHYSICIAN ASSISTANT

## 2025-05-28 PROCEDURE — 250N000013 HC RX MED GY IP 250 OP 250 PS 637

## 2025-05-28 PROCEDURE — 99232 SBSQ HOSP IP/OBS MODERATE 35: CPT | Mod: FS | Performed by: NURSE PRACTITIONER

## 2025-05-28 PROCEDURE — 258N000003 HC RX IP 258 OP 636: Performed by: PHYSICIAN ASSISTANT

## 2025-05-28 PROCEDURE — 250N000011 HC RX IP 250 OP 636

## 2025-05-28 PROCEDURE — 36415 COLL VENOUS BLD VENIPUNCTURE: CPT | Performed by: PHYSICIAN ASSISTANT

## 2025-05-28 PROCEDURE — 36415 COLL VENOUS BLD VENIPUNCTURE: CPT | Performed by: INTERNAL MEDICINE

## 2025-05-28 PROCEDURE — 250N000009 HC RX 250: Performed by: PHYSICIAN ASSISTANT

## 2025-05-28 RX ORDER — LIDOCAINE 40 MG/G
CREAM TOPICAL
Status: DISCONTINUED | OUTPATIENT
Start: 2025-05-28 | End: 2025-05-28

## 2025-05-28 RX ORDER — SODIUM CHLORIDE, SODIUM LACTATE, POTASSIUM CHLORIDE, CALCIUM CHLORIDE 600; 310; 30; 20 MG/100ML; MG/100ML; MG/100ML; MG/100ML
INJECTION, SOLUTION INTRAVENOUS CONTINUOUS
Status: DISCONTINUED | OUTPATIENT
Start: 2025-05-28 | End: 2025-06-02 | Stop reason: HOSPADM

## 2025-05-28 RX ORDER — TORSEMIDE 20 MG/1
20 TABLET ORAL DAILY
Status: DISCONTINUED | OUTPATIENT
Start: 2025-05-28 | End: 2025-05-28

## 2025-05-28 RX ORDER — TORSEMIDE 20 MG/1
20 TABLET ORAL DAILY
Status: DISCONTINUED | OUTPATIENT
Start: 2025-05-28 | End: 2025-06-02 | Stop reason: HOSPADM

## 2025-05-28 RX ADMIN — ACETAMINOPHEN 975 MG: 325 TABLET, FILM COATED ORAL at 02:47

## 2025-05-28 RX ADMIN — OXYCODONE HYDROCHLORIDE 5 MG: 5 TABLET ORAL at 02:47

## 2025-05-28 RX ADMIN — HEPARIN SODIUM 1800 UNITS/HR: 10000 INJECTION, SOLUTION INTRAVENOUS at 02:51

## 2025-05-28 RX ADMIN — DOXYCYCLINE HYCLATE 100 MG: 100 CAPSULE ORAL at 20:15

## 2025-05-28 RX ADMIN — DOXYCYCLINE HYCLATE 100 MG: 100 CAPSULE ORAL at 08:13

## 2025-05-28 RX ADMIN — OXYCODONE HYDROCHLORIDE 5 MG: 5 TABLET ORAL at 22:57

## 2025-05-28 RX ADMIN — DILTIAZEM HYDROCHLORIDE 10 MG/HR: 5 INJECTION INTRAVENOUS at 12:15

## 2025-05-28 RX ADMIN — OXYCODONE HYDROCHLORIDE 5 MG: 5 TABLET ORAL at 10:37

## 2025-05-28 RX ADMIN — PANTOPRAZOLE SODIUM 40 MG: 40 TABLET, DELAYED RELEASE ORAL at 16:08

## 2025-05-28 RX ADMIN — CYANOCOBALAMIN TAB 500 MCG 500 MCG: 500 TAB at 08:12

## 2025-05-28 RX ADMIN — SENNOSIDES AND DOCUSATE SODIUM 2 TABLET: 50; 8.6 TABLET ORAL at 10:40

## 2025-05-28 RX ADMIN — AMIODARONE HYDROCHLORIDE 400 MG: 200 TABLET ORAL at 08:11

## 2025-05-28 RX ADMIN — LAMOTRIGINE 100 MG: 100 TABLET ORAL at 08:12

## 2025-05-28 RX ADMIN — HEPARIN SODIUM 1800 UNITS/HR: 10000 INJECTION, SOLUTION INTRAVENOUS at 17:47

## 2025-05-28 RX ADMIN — AMIODARONE HYDROCHLORIDE 400 MG: 200 TABLET ORAL at 20:15

## 2025-05-28 RX ADMIN — METOPROLOL TARTRATE 5 MG: 5 INJECTION INTRAVENOUS at 06:28

## 2025-05-28 RX ADMIN — PANTOPRAZOLE SODIUM 40 MG: 40 TABLET, DELAYED RELEASE ORAL at 06:30

## 2025-05-28 RX ADMIN — TORSEMIDE 20 MG: 20 TABLET ORAL at 14:22

## 2025-05-28 ASSESSMENT — ACTIVITIES OF DAILY LIVING (ADL)
ADLS_ACUITY_SCORE: 50
ADLS_ACUITY_SCORE: 52
ADLS_ACUITY_SCORE: 51
ADLS_ACUITY_SCORE: 50
ADLS_ACUITY_SCORE: 52
ADLS_ACUITY_SCORE: 50
ADLS_ACUITY_SCORE: 50
ADLS_ACUITY_SCORE: 52
ADLS_ACUITY_SCORE: 50
ADLS_ACUITY_SCORE: 51
ADLS_ACUITY_SCORE: 52
ADLS_ACUITY_SCORE: 51
ADLS_ACUITY_SCORE: 50
ADLS_ACUITY_SCORE: 51

## 2025-05-28 NOTE — PROGRESS NOTES
Care Management Follow Up    Length of Stay (days): 6    Expected Discharge Date: 05/29/2025     Concerns to be Addressed: discharge planning     Patient plan of care discussed at interdisciplinary rounds: Yes    Anticipated Discharge Disposition: Group Home              Anticipated Discharge Services: None  Anticipated Discharge DME: None    Patient/family educated on Medicare website which has current facility and service quality ratings:    Education Provided on the Discharge Plan:    Patient/Family in Agreement with the Plan: yes    Referrals Placed by CM/SW:    Private pay costs discussed: Not applicable    Discussed  Partnership in Safe Discharge Planning  document with patient/family: No     Handoff Completed: No, handoff not indicated or clinically appropriate    Additional Information:  Per chart review, plan is for patient to have EGD once stable from cardiac perspective.     Next Steps: Continue to follow and assist with safe discharge plan.    Kaylah Guan RN Care Coordinator  [facility]  [phone]

## 2025-05-28 NOTE — PROGRESS NOTES
EP Progress Note          Assessment and Plan:   Omayra Malone is a 60 year old female with PMH including liver cirrhosis hepatitis C, polysubstance abuse (recent cocaine), MEREDITH, hypothyroidism, hypertension, chronic anemia residing in a group home who was admitted on 5/22/2025 with rapid 2-1 atrial flutter requiring cardioversion in the ED with recurrence, subsequently started on IV to oral amiodarone and remains rapid despite this as well as low-dose metoprolol, 1 dose of IV digoxin given 5/24 (rate limiting medications limited by hypotension).  EF reduced on echocardiogram at 35% significantly volume overloaded requiring diuresis.  She is not a good long-term amiodarone candidate with cirrhosis.      Newly diagnosed atrial flutter, on amiodarone and transition to oral 400 mg bid on 5/26.  On heparin drip.  Patient scheduled for atrial flutter ablation with [facility] today (consent signed). IV lopressor 5 mg given at 0630, and still on diltiazem gtt 5 mg/hr. Rates this am ~ bpm.  HFrEF, EF 35%.  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  Patient diuresed ~40 lbs. weight up 8 pounds, but on a bed scale this morning.  Will ask for a standing weight.  Acute chest pain, low suspicion for ACS as well as PE. No pain this morning. On heparin since 5/23.  CT of the chest completed yesterday showed no evidence of aortic dissection.  No acute findings.  Anemia thought likely due to to dilutional.  GI following.  EGD canceled due to flutter.  Hemoglobin stable 8.7.     PLAN:  -Will restart po metoprolol this am with hold parameter for SBP <90 mmHg.  -Procedure might be pushed to tomorrow r/t EP lab availability and pt currently has better rate control.  -If rates are better controlled perhaps EGD can be completed  -Will restart torsemide at reduced dose to 20 mg every day. Weight climbing (8 lbs), but different scale.    Will make patient n.p.o. for tomorrow in case her RVR returns and ablation therapy is warranted.        Myriam  "ULISSES Moreno NP, APRN CNP  Beeper [phone]        Interval History:   Pt has no complaints of chest pain this am or sob. Not aware of flutter at this time per pt. VSSA. Tele aflutter with variable rate.       Medications:     Current Facility-Administered Medications   Medication Dose Route Frequency Provider Last Rate Last Admin    amiodarone (PACERONE) tablet 400 mg  400 mg Oral BID Marcello Benavides MD   400 mg at 25 0811    cyanocobalamin (VITAMIN B-12) tablet 500 mcg  500 mcg Oral Daily Loki Anderson MD   500 mcg at 25 0812    doxycycline hyclate (VIBRAMYCIN) capsule 100 mg  100 mg Oral BID Loco Murray MD   100 mg at 25 0813    lamoTRIgine (LaMICtal) tablet 100 mg  100 mg Oral Daily Loco Murray MD   100 mg at 25 0812    [Held by provider] metoprolol tartrate (LOPRESSOR) tablet 25 mg  25 mg Oral BID Myriam Moreon, APRN CNP   25 mg at 25 0824    nicotine (NICODERM CQ) 7 MG/24HR 24 hr patch 1 patch  1 patch Transdermal Daily Loki Anderson MD        pantoprazole (PROTONIX) EC tablet 40 mg  40 mg Oral BID AC Clayton Freire MD   40 mg at 25 0630    sodium chloride (PF) 0.9% PF flush 3 mL  3 mL Intracatheter Q8H PHU Loco Murray MD   3 mL at 25 0629    [Held by provider] torsemide (DEMADEX) tablet 20 mg  20 mg Oral BID Marcello Benavides MD   20 mg at 25 0824             Review of Systems: If done, described below  The Review of Systems is negative other than noted in the HPI             Physical Exam:   Blood pressure 120/73, pulse (!) 142, temperature 98.2  F (36.8  C), temperature source Oral, resp. rate 20, height 1.626 m (5' 4\"), weight (!) 136.1 kg (300 lb 1.6 oz), SpO2 92%.      Vital Sign Ranges  Temperature Temp  Av.3  F (36.8  C)  Min: 98.1  F (36.7  C)  Max: 98.9  F (37.2  C)   Blood pressure Systolic (24hrs), Av , Min:77 , Max:131        Diastolic (24hrs), Av, Min:55, Max:87      Pulse Pulse  Av  Min: 108  " Max: 151   Respirations Resp  Av.3  Min: 16  Max: 20   Pulse oximetry SpO2  Av.8 %  Min: 88 %  Max: 99 %         Intake/Output Summary (Last 24 hours) at 2025 0750  Last data filed at 2025 0353  Gross per 24 hour   Intake 1340 ml   Output 2200 ml   Net -860 ml       Constitutional:   in no apparent distress     Lungs:   Dim in bases, otherwise clear,     Cardiovascular:   regularly irregular rhythm, normal S1 and S2, and no murmur noted     Extremities and Back:   No edema              Data:     Lab Results   Component Value Date    WBC 6.8 2025    HGB 8.7 (L) 2025    HCT 28.8 (L) 2025     2025     2025    POTASSIUM 4.6 2025    CHLORIDE 95 (L) 2025    CO2 31 (H) 2025    BUN 27.9 (H) 2025    CR 0.98 (H) 2025     (H) 2025    NTBNP 1,648 (H) 2025    AST 15 2025    ALT 12 2025    ALKPHOS 124 2025    BILITOTAL 0.2 2025    INR 1.18 (H) 2025

## 2025-05-28 NOTE — PLAN OF CARE
Goal Outcome Evaluation:      Plan of Care Reviewed With: patient      Patient is up in the room with stand by assist walker, tolerated food, Heart rate is elevated on diltiazem drip. Continue to monitor. Contact precautions maintained.

## 2025-05-28 NOTE — PLAN OF CARE
VSS- although HR remains elevated in the 150s- dilt gtt continues but on lowest dose due to soft blood pressures, pt is asymptomatic with this, hep gtt continues, prns available for chest discomfort, plan for afib ablation tomorrow, continue to monitor closely.

## 2025-05-28 NOTE — PROGRESS NOTES
[facility]    Medicine Progress Note - Hospitalist Service    Date of Admission:  5/22/2025    Assessment & Plan   Omayra Malone is a 60 year old female with history of hypertension, hepatitis C, liver cirrhosis, chronic anemia, obesity status post gastric bypass, bipolar affective disorder, MEREDITH, hypothyroidism, osteoarthritis admitted on 5/22/2025 for shortness of breath, palpitations.     Acute heart failure with reduced EF of 35%, decompensated  Atrial flutter with RVR-status post DCCV on 5/22, flipped to normal sinus rhythm, now in A flutter  Acute hypoxic respiratory failure multifactorial from heart failure exacerbation, atrial flutter, Anemia, obstructive sleep apnea, OHS, deconditioning.  Moderate tricuspid regurgitation.  Mild pulmonary hypertension.  Mild to moderate MR.  Hypertension.  *Patient resident of [facility], presented with shortness of breath and palpitations.  Patient initially denied drug use,  urine positive for cocaine.  Reports recent use.  *On EMS arrival the patient was found to be in SVT at a pulse of 176. En route to the ED they attempted 6mg Adenosine, followed shortly after by another 12 mg with no success. -SpO2 was 94% en route to the ED. in ED heart rate in 170s, blood pressure in 120s systolic.  Sodium 141, potassium 3.9, creatinine 0.62.  *WBC 9.6, TSH 2.06.  N-terminal proBNP 1648.  Troponin high-sensitivity 12 >1  *Chest x-ray no definitive evidence for pulmonary edema, no concerns for infection.  *EKG sinus tachycardia, left axis deviation, ST-T wave changes.  *Echocardiogram with LVEF of 35%, moderate global hypokinesis of the left ventricle.  Right ventricle  mildly dilated.  Moderate mitral regurgitation, moderate tricuspid regurgitation.  Mild pulmonary hypertension.  *5/23 - was on intravenous heparin drip, initiated IV Lasix drip for diuresis.  *5/24 --in A-fib/flutter with rates in 140s to 150s.  Initiated intravenous amiodarone drip.    *5/27-  had chest pain, trop was negative. Seems reproducible. CT of the chest ordered by cardiology was negative for aortic dissection, no acute findings    - rates briefly dropped to 100 am of 5/28, but but went back upto 140s  - Amio drip transition to PO amiodarone on 5/26/2025  - Continue intravenous heparin drip.  - Lasix drip-->> Torsemide 20mg BID on 5/26/2025, BUN/Cr slightly going up on 5/27, has gotten am dose, held PM dose, creatinine stable/improving, wt is up on 5/28, resume Torsemide 20mg daily   - metoprolol, dilt dosing per cardiology  - Hold prior to admission amlodipine, hydrochlorothiazide to allow room for diuresis, beta-blocker.    - Continue telemetry monitoring.  - Intake output monitoring, daily weights  - General cardiology following, appreciate help  - EP evaluating, planning for possible ablation on 5/29       Hypokalemia, hypomagnesemia.  Likely from diuresis.  Potassium 3.3, magnesium 1.4.  Keep potassium around 4, magnesium around 2, replacement protocols ordered     Acute on chronic anemia -likely dilutional, rule out blood loss.  Microcytic anemia, severe iron deficiency.  Vitamin B12 deficiency.  Hemoglobin 8.0 from previous 11.8 1-year ago, patient denies bleeding or melena. Does have history of gastric bypass does not appear to be on B12.    *Iron saturation index 4, vitamin B12 150.  CK29.  Folate 16.7.  UA no hematuria.  *IV Venofer for 2 doses -5/23, 5/24.   -Initiated on oral iron, vitamin B12 supplements -to continue.  - change IV PPI to PO PPI  -Consent for blood transfusion obtained, transfuse for hemoglobin less than 8 or symptomatic.    -[facility] following, appreciate help. EGD cancelled on 5/27 due to ongoing flutter w/rvr, GI now following peripherally pending improved rate issues.      Cirrhosis secondary to Hep C and NAFLD  Noted- per chart review, [facility] 2021 well-compensated cirrhosis due to Hep C (treated) and NAFLD.   IV pantoprazole as above.     Left hand  pain  Reports sudden pain while holding something a few weeks back.  Patient has been receiving diuresis with intravenous Lasix, significant improvement in swelling, erythema.  Continues to complain of pain.    Afebrile, no leukocytosis CRP 4.74, .  X-ray left hand / thumb (5/25)-Negative for fracture or dislocation. Moderate soft tissue swelling in the thumb. Moderate of osteoarthritis at the base of the thumb and the in CMC and STT joints, and throughout the IP joints with joint space narrowing and spurring.   Monitor closely.      LUE superficial vein thrombosis  *had prior IV access there and also recent extravasation of IV Protonix.  *House MEGGAN evaluated on 5/26 evening, refer to note for detail. Noted some erythema and edema  *LUE US (5/27)1.  Positive for cephalic vein thrombus at the distal humerus and antecubital fossa spanning greater than 5 cm.  2.  No deep venous thrombosis in the left upper extremity.    - erythema much improved on 5/28, no tenderness, has palpable cord over the antecubital fossa consistent with imaging finding of SVT  - continue supportive measures-elevation, warm compresses.        Cocaine use.  Patient initially denied drug use, group home staff reported concern for cocaine use.  Urine drug screen positive for cocaine.  Patient reports recent use -with her boyfriend.  Emphasized need to consider abstinence, will need chemical dependency evaluation once closer to discharge.      Acute on chronic anxiety.  Bipolar affective disorder  Continue PTA lamictal 100mg daily  Continue PTA xanax PRN at reduce dose (does not take when she is prescribed oxycodone which she currently is for leg pain)  As needed Atarax 4 times a day     Chronic leg and back pain   Follows with [facility] pain clinic, chronically on oxycodone.  Continue PTA oxycodone 5mg q8prn, minimize narcotic use as able to.     History of hypothyroidism.  PTA not on meds.  TSH within normal limits     Medically complicated  "obesity s/p gastric bypass  Body mass index is 55.85 kg/m . Recommend outpatient follow up/ongoing weight loss.      MEREDITH  Does not use CPAP.  Consider sleep studies as outpatient.     Chronic doxycycline use  No clear indication on chart review, reports chronically used.  Continue for now      Physical deconditioning from medical illness.  PT, OT evaluation prior to discharge.  Fall precautions.  Encourage ambulation out of bed as able to.     Nicotine use.  Reports intermittent vaping.  Emphasized need to consider abstinence, nicotine patch ordered          Diet: Fluid restriction 2000 ML FLUID  Low Saturated Fat Na <2400 mg  NPO for Medical/Clinical Reasons Except for: Other; Specify: May take medications with a drink of water prior to Electrophysiology study    DVT Prophylaxis: Heparin   Stafford Catheter: Not present  Lines: None     Cardiac Monitoring: ACTIVE order. Indication: Tachyarrhythmias, acute (48 hours)  Code Status: Full Code      Clinically Significant Risk Factors          # Hypochloremia: Lowest Cl = 93 mmol/L in last 2 days, will monitor as appropriate       # Coagulation Defect: INR = 1.18 (Ref range: 0.85 - 1.15) and/or PTT = N/A, will monitor for bleeding       # Acute heart failure with reduced ejection fraction: last echo with EF <40% and receiving IV diuretics           # Morbid Obesity: Estimated body mass index is 51.51 kg/m  as calculated from the following:    Height as of this encounter: 1.626 m (5' 4\").    Weight as of this encounter: 136.1 kg (300 lb 1.6 oz).        # Financial/Environmental Concerns: none         Social Drivers of Health    Tobacco Use: High Risk (1/9/2025)    [facility]    Patient History     Smoking Tobacco Use: Some Days     Smokeless Tobacco Use: Former          Disposition Plan     Medically Ready for Discharge: Anticipated in 2-4 Days             Clayton Freire MD  Hospitalist Service  [facility] " [facility]  Securely message with [employer] (more info)  Text page via [email or web address] Paging/Directory   ______________________________________________________________________    Interval History   Denies shortness of breath. She is afebrile.   HR briefly decreased to the 90s-100, back up to 140s during visit. Denies n/v or abdominal pain    Physical Exam   Vital Signs: Temp: 98.2  F (36.8  C) Temp src: Oral BP: 120/73 Pulse: (!) 142   Resp: 20 SpO2: 92 % O2 Device: None (Room air)    Weight: 300 lbs 1.6 oz    General Appearance: Alert, awake and no apparent distress  Respiratory: clear to auscultation  Cardiovascular: tachycardic  GI: soft and non-tender  Skin: LUE--+edema, palpable cord, no significnat erythema over the anti cubital fossa, non-tender,       Medical Decision Making       MANAGEMENT DISCUSSED with the following over the past 24 hours: patient, RN   NOTE(S)/MEDICAL RECORDS REVIEWED over the past 24 hours: nursing note, cardiology note,   Tests ORDERED & REVIEWED in the past 24 hours:  - BMP  - CBC          Data     I have personally reviewed the following data over the past 24 hrs:    6.8  \   8.7 (L)   / 269     138 95 (L) 27.9 (H) /  118 (H)   4.6 31 (H) 0.98 (H) \     Trop: 9 BNP: N/A     INR:  1.18 (H) PTT:  N/A   D-dimer:  N/A Fibrinogen:  N/A       Imaging results reviewed over the past 24 hrs:   Recent Results (from the past 24 hours)   US Upper Extremity Venous Duplex Left   Result Value    Radiologist flags See impression (Urgent)    Narrative    EXAM: US UPPER EXTREMITY VENOUS DUPLEX LEFT  LOCATION: [facility]  DATE: 5/27/2025    INDICATION: LUE AC extravasation, redness swelling firm  COMPARISON: None.  TECHNIQUE: Venous Duplex ultrasound of the left upper extremity with (when possible) and without compression, augmentation, and duplex. Color flow and spectral Doppler with waveform analysis performed.    FINDINGS: Ultrasound includes evaluation of the internal jugular  vein, innominate vein, subclavian vein, axillary vein, and brachial vein. The superficial cephalic and basilic veins were also evaluated where seen.     LEFT: No deep venous thrombosis. Positive for nearly occlusive thrombus within the cephalic vein at the distal humerus and antecubital fossa at the previous IV site spanning greater than 5 cm.      Impression    IMPRESSION:   1.  Positive for cephalic vein thrombus at the distal humerus and antecubital fossa spanning greater than 5 cm.  2.  No deep venous thrombosis in the left upper extremity.    [Access Center: See impression]    This report will be copied to the [facility] to ensure a provider acknowledges the finding. Access Center is available Monday through Friday 8am-3:30 pm.   CT Chest w Contrast    Narrative    EXAM: CT CHEST W CONTRAST  LOCATION: [facility]  DATE: 5/27/2025    INDICATION: chest pain, rule out aortic dissection  COMPARISON: None.  TECHNIQUE: CT chest with IV contrast. Multiplanar reformats were obtained. Dose reduction techniques were used.    CONTRAST: 135mL Isovue 370    FINDINGS:   LUNGS AND PLEURA: Dependent atelectasis. Lungs are otherwise clear. No pleural effusion or pneumothorax.    MEDIASTINUM/AXILLAE: No aortic dissection or aneurysm. No central pulmonary embolus. No lymphadenopathy.    CORONARY ARTERY CALCIFICATION: Moderate.    UPPER ABDOMEN: Normal.    MUSCULOSKELETAL: Normal.      Impression    IMPRESSION:   1.  No aortic dissection. No acute findings in the chest.

## 2025-05-28 NOTE — PLAN OF CARE
Goal Outcome Evaluation:      Plan of Care Reviewed With: patient    Overall Patient Progress: no changeOverall Patient Progress: no change    Neuro: A&Ox4 neuros intact CMS intact  Tele/Cardiac: Aflutter RVR  Vitals:VSS onRA ex -140  Activity: Ax1 walker, pivot to chair  Pain: 6/10pain managed w/ morphine and oxy and tyelnol  Drips/IV: Heparin@1800units/hr and dilt@5mg/hr  GI/: incontinent of urine  Skin: scattered bruising, L forearm extravasation, rash to breast and folds  Diet: NPO  Test/Procedures: ICE ablation  Plan: ICE ablation and possibledEGD, GI following

## 2025-05-28 NOTE — PROGRESS NOTES
GI   Will follow peripherally, re-attempt EGD scheduling after aflutter/rate issues resolved.    Please call with questions.     Galen Garvin DO   [facility]  Office [phone]

## 2025-05-29 ENCOUNTER — ANESTHESIA EVENT (OUTPATIENT)
Dept: GASTROENTEROLOGY | Facility: CLINIC | Age: 60
End: 2025-05-29
Payer: COMMERCIAL

## 2025-05-29 ENCOUNTER — ANESTHESIA (OUTPATIENT)
Dept: GASTROENTEROLOGY | Facility: CLINIC | Age: 60
End: 2025-05-29
Payer: COMMERCIAL

## 2025-05-29 VITALS
WEIGHT: 293 LBS | HEIGHT: 64 IN | HEART RATE: 93 BPM | BODY MASS INDEX: 50.02 KG/M2 | TEMPERATURE: 97.9 F | RESPIRATION RATE: 20 BRPM | DIASTOLIC BLOOD PRESSURE: 69 MMHG | SYSTOLIC BLOOD PRESSURE: 118 MMHG | OXYGEN SATURATION: 96 %

## 2025-05-29 LAB
ANION GAP SERPL CALCULATED.3IONS-SCNC: 13 MMOL/L (ref 7–15)
BUN SERPL-MCNC: 25.6 MG/DL (ref 8–23)
CALCIUM SERPL-MCNC: 9.6 MG/DL (ref 8.8–10.4)
CHLORIDE SERPL-SCNC: 95 MMOL/L (ref 98–107)
CREAT SERPL-MCNC: 0.97 MG/DL (ref 0.51–0.95)
EGFRCR SERPLBLD CKD-EPI 2021: 66 ML/MIN/1.73M2
ERYTHROCYTE [DISTWIDTH] IN BLOOD BY AUTOMATED COUNT: 22.1 % (ref 10–15)
GLUCOSE SERPL-MCNC: 111 MG/DL (ref 70–99)
HCO3 SERPL-SCNC: 30 MMOL/L (ref 22–29)
HCT VFR BLD AUTO: 28.7 % (ref 35–47)
HGB BLD-MCNC: 8.3 G/DL (ref 11.7–15.7)
MAGNESIUM SERPL-MCNC: 2.1 MG/DL (ref 1.7–2.3)
MCH RBC QN AUTO: 21.9 PG (ref 26.5–33)
MCHC RBC AUTO-ENTMCNC: 28.9 G/DL (ref 31.5–36.5)
MCV RBC AUTO: 76 FL (ref 78–100)
PLATELET # BLD AUTO: 261 10E3/UL (ref 150–450)
POTASSIUM SERPL-SCNC: 4.7 MMOL/L (ref 3.4–5.3)
RBC # BLD AUTO: 3.79 10E6/UL (ref 3.8–5.2)
SODIUM SERPL-SCNC: 138 MMOL/L (ref 135–145)
UFH PPP CHRO-ACNC: 0.32 IU/ML (ref ?–1.1)
UPPER GI ENDOSCOPY: NORMAL
WBC # BLD AUTO: 6.8 10E3/UL (ref 4–11)

## 2025-05-29 PROCEDURE — 43235 EGD DIAGNOSTIC BRUSH WASH: CPT | Performed by: INTERNAL MEDICINE

## 2025-05-29 PROCEDURE — 83735 ASSAY OF MAGNESIUM: CPT | Performed by: HOSPITALIST

## 2025-05-29 PROCEDURE — 370N000017 HC ANESTHESIA TECHNICAL FEE, PER MIN: Performed by: INTERNAL MEDICINE

## 2025-05-29 PROCEDURE — 250N000011 HC RX IP 250 OP 636

## 2025-05-29 PROCEDURE — 999N000010 HC STATISTIC ANES STAT CODE-CRNA PER MINUTE: Performed by: INTERNAL MEDICINE

## 2025-05-29 PROCEDURE — 250N000013 HC RX MED GY IP 250 OP 250 PS 637: Performed by: HOSPITALIST

## 2025-05-29 PROCEDURE — 250N000013 HC RX MED GY IP 250 OP 250 PS 637: Performed by: INTERNAL MEDICINE

## 2025-05-29 PROCEDURE — 250N000009 HC RX 250: Performed by: PHYSICIAN ASSISTANT

## 2025-05-29 PROCEDURE — 99232 SBSQ HOSP IP/OBS MODERATE 35: CPT | Mod: FS | Performed by: INTERNAL MEDICINE

## 2025-05-29 PROCEDURE — 85027 COMPLETE CBC AUTOMATED: CPT

## 2025-05-29 PROCEDURE — 250N000009 HC RX 250: Performed by: NURSE ANESTHETIST, CERTIFIED REGISTERED

## 2025-05-29 PROCEDURE — 99233 SBSQ HOSP IP/OBS HIGH 50: CPT | Performed by: INTERNAL MEDICINE

## 2025-05-29 PROCEDURE — 258N000003 HC RX IP 258 OP 636: Performed by: NURSE ANESTHETIST, CERTIFIED REGISTERED

## 2025-05-29 PROCEDURE — 0W3P8ZZ CONTROL BLEEDING IN GASTROINTESTINAL TRACT, VIA NATURAL OR ARTIFICIAL OPENING ENDOSCOPIC: ICD-10-PCS | Performed by: INTERNAL MEDICINE

## 2025-05-29 PROCEDURE — 210N000001 HC R&B IMCU HEART CARE

## 2025-05-29 PROCEDURE — 250N000011 HC RX IP 250 OP 636: Performed by: NURSE ANESTHETIST, CERTIFIED REGISTERED

## 2025-05-29 PROCEDURE — 258N000003 HC RX IP 258 OP 636: Performed by: PHYSICIAN ASSISTANT

## 2025-05-29 PROCEDURE — 85520 HEPARIN ASSAY: CPT | Performed by: HOSPITALIST

## 2025-05-29 PROCEDURE — 36415 COLL VENOUS BLD VENIPUNCTURE: CPT | Performed by: HOSPITALIST

## 2025-05-29 PROCEDURE — 250N000013 HC RX MED GY IP 250 OP 250 PS 637

## 2025-05-29 PROCEDURE — 84520 ASSAY OF UREA NITROGEN: CPT | Performed by: INTERNAL MEDICINE

## 2025-05-29 RX ORDER — FERROUS SULFATE 325(65) MG
325 TABLET ORAL EVERY OTHER DAY
Status: DISCONTINUED | OUTPATIENT
Start: 2025-05-30 | End: 2025-06-02 | Stop reason: HOSPADM

## 2025-05-29 RX ORDER — LANOLIN ALCOHOL/MO/W.PET/CERES
1000 CREAM (GRAM) TOPICAL DAILY
Status: DISCONTINUED | OUTPATIENT
Start: 2025-05-30 | End: 2025-06-02 | Stop reason: HOSPADM

## 2025-05-29 RX ORDER — ONDANSETRON 2 MG/ML
INJECTION INTRAMUSCULAR; INTRAVENOUS PRN
Status: DISCONTINUED | OUTPATIENT
Start: 2025-05-29 | End: 2025-05-29

## 2025-05-29 RX ORDER — METOPROLOL TARTRATE 25 MG/1
25 TABLET, FILM COATED ORAL 2 TIMES DAILY
Status: DISCONTINUED | OUTPATIENT
Start: 2025-05-29 | End: 2025-05-30

## 2025-05-29 RX ORDER — LIDOCAINE HYDROCHLORIDE 20 MG/ML
INJECTION, SOLUTION INFILTRATION; PERINEURAL PRN
Status: DISCONTINUED | OUTPATIENT
Start: 2025-05-29 | End: 2025-05-29

## 2025-05-29 RX ORDER — SODIUM CHLORIDE, SODIUM LACTATE, POTASSIUM CHLORIDE, CALCIUM CHLORIDE 600; 310; 30; 20 MG/100ML; MG/100ML; MG/100ML; MG/100ML
INJECTION, SOLUTION INTRAVENOUS CONTINUOUS PRN
Status: DISCONTINUED | OUTPATIENT
Start: 2025-05-29 | End: 2025-05-29

## 2025-05-29 RX ORDER — PROPOFOL 10 MG/ML
INJECTION, EMULSION INTRAVENOUS CONTINUOUS PRN
Status: DISCONTINUED | OUTPATIENT
Start: 2025-05-29 | End: 2025-05-29

## 2025-05-29 RX ADMIN — DOXYCYCLINE HYCLATE 100 MG: 100 CAPSULE ORAL at 19:59

## 2025-05-29 RX ADMIN — ONDANSETRON 4 MG: 2 INJECTION INTRAMUSCULAR; INTRAVENOUS at 11:12

## 2025-05-29 RX ADMIN — CYANOCOBALAMIN TAB 500 MCG 500 MCG: 500 TAB at 08:27

## 2025-05-29 RX ADMIN — METOPROLOL TARTRATE 25 MG: 25 TABLET, FILM COATED ORAL at 20:00

## 2025-05-29 RX ADMIN — HEPARIN SODIUM 1800 UNITS/HR: 10000 INJECTION, SOLUTION INTRAVENOUS at 08:31

## 2025-05-29 RX ADMIN — AMIODARONE HYDROCHLORIDE 400 MG: 200 TABLET ORAL at 20:01

## 2025-05-29 RX ADMIN — DOXYCYCLINE HYCLATE 100 MG: 100 CAPSULE ORAL at 08:27

## 2025-05-29 RX ADMIN — PROPOFOL 250 MCG/KG/MIN: 10 INJECTION, EMULSION INTRAVENOUS at 11:07

## 2025-05-29 RX ADMIN — OXYCODONE HYDROCHLORIDE 5 MG: 5 TABLET ORAL at 19:59

## 2025-05-29 RX ADMIN — TORSEMIDE 20 MG: 20 TABLET ORAL at 08:27

## 2025-05-29 RX ADMIN — DILTIAZEM HYDROCHLORIDE 10 MG/HR: 5 INJECTION INTRAVENOUS at 00:05

## 2025-05-29 RX ADMIN — TOPICAL ANESTHETIC 0.5 ML: 200 SPRAY DENTAL; PERIODONTAL at 11:05

## 2025-05-29 RX ADMIN — PANTOPRAZOLE SODIUM 40 MG: 40 TABLET, DELAYED RELEASE ORAL at 17:00

## 2025-05-29 RX ADMIN — PANTOPRAZOLE SODIUM 40 MG: 40 TABLET, DELAYED RELEASE ORAL at 08:27

## 2025-05-29 RX ADMIN — LIDOCAINE HYDROCHLORIDE 80 MG: 20 INJECTION, SOLUTION INFILTRATION; PERINEURAL at 11:10

## 2025-05-29 RX ADMIN — DEXMEDETOMIDINE HYDROCHLORIDE 8 MCG: 100 INJECTION, SOLUTION INTRAVENOUS at 11:13

## 2025-05-29 RX ADMIN — LAMOTRIGINE 100 MG: 100 TABLET ORAL at 08:27

## 2025-05-29 RX ADMIN — SODIUM CHLORIDE, SODIUM LACTATE, POTASSIUM CHLORIDE, AND CALCIUM CHLORIDE: .6; .31; .03; .02 INJECTION, SOLUTION INTRAVENOUS at 11:04

## 2025-05-29 RX ADMIN — DEXMEDETOMIDINE HYDROCHLORIDE 8 MCG: 100 INJECTION, SOLUTION INTRAVENOUS at 11:04

## 2025-05-29 RX ADMIN — AMIODARONE HYDROCHLORIDE 400 MG: 200 TABLET ORAL at 08:27

## 2025-05-29 RX ADMIN — HEPARIN SODIUM 1800 UNITS/HR: 10000 INJECTION, SOLUTION INTRAVENOUS at 22:15

## 2025-05-29 RX ADMIN — METOPROLOL TARTRATE 25 MG: 25 TABLET, FILM COATED ORAL at 13:43

## 2025-05-29 ASSESSMENT — ACTIVITIES OF DAILY LIVING (ADL)
ADLS_ACUITY_SCORE: 52
ADLS_ACUITY_SCORE: 48
ADLS_ACUITY_SCORE: 52
ADLS_ACUITY_SCORE: 52
ADLS_ACUITY_SCORE: 48
ADLS_ACUITY_SCORE: 48
ADLS_ACUITY_SCORE: 52
ADLS_ACUITY_SCORE: 48
ADLS_ACUITY_SCORE: 48
ADLS_ACUITY_SCORE: 52
ADLS_ACUITY_SCORE: 48
ADLS_ACUITY_SCORE: 52
ADLS_ACUITY_SCORE: 48
ADLS_ACUITY_SCORE: 52

## 2025-05-29 ASSESSMENT — ENCOUNTER SYMPTOMS: DYSRHYTHMIAS: 1

## 2025-05-29 NOTE — PLAN OF CARE
Goal Outcome Evaluation:      Plan of Care Reviewed With: patient      Patient had EGD today, up with walker and stand by assist, tolerated food after procedure, VSS, RA continue to monitor. Possible Ablation tomorrow. Consent is sign. Weaning off diltiazem drip restarted on metoprolol oral. Patient do not wants to do anything just stay in bed and eat.

## 2025-05-29 NOTE — ANESTHESIA POSTPROCEDURE EVALUATION
Patient: Omayra Malone    Procedure: Procedure(s):  Esophagoscopy, gastroscopy, duodenoscopy (EGD), combined       Anesthesia Type:  MAC    Note:     Postop Pain Control: Uneventful            Sign Out: Well controlled pain   PONV: No   Neuro/Psych: Uneventful            Sign Out: Acceptable/Baseline neuro status   Airway/Respiratory: Uneventful            Sign Out: Acceptable/Baseline resp. status   CV/Hemodynamics: Uneventful            Sign Out: Acceptable CV status; No obvious hypovolemia; No obvious fluid overload   Other NRE: NONE   DID A NON-ROUTINE EVENT OCCUR? No           Last vitals:  Vitals Value Taken Time   /70 05/29/25 11:50   Temp     Pulse 77 05/29/25 11:59   Resp 16 05/29/25 11:59   SpO2 94 % 05/29/25 11:59   Vitals shown include unfiled device data.    Electronically Signed By: Oscar Lindo MD  May 29, 2025  12:00 PM
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
## 2025-05-29 NOTE — PLAN OF CARE
[id number]:    No events this four hour shift. Tolerating diet after EGD this afternoon. VSS on room air. Tele: a-fib CVR. Denies palpitations, chest pain/pressure, dyspnea at rest.  Up with A1, gb, walker. Declined ambulation/OOB for meals. Heparin infusing. Anticipate a-fib/flutter ablation as early as tomorrow.

## 2025-05-29 NOTE — PLAN OF CARE
Goal Outcome Evaluation:  Problem: Dysrhythmia  Goal: Normalized Cardiac Rhythm  Outcome: Not Progressing  Intervention: Monitor and Manage Cardiac Rhythm Effect  Recent Flowsheet Documentation  Taken 5/29/2025 0400 by Marguerite Pabon RN  VTE Prevention/Management: SCDs off (sequential compression devices)per pt request  Taken 5/29/2025 0000 by Marguerite Pabon RN  VTE Prevention/Management: SCDs off (sequential compression devices)   Rate controlled a flutter-on therapeutic heparin gtt.     Problem: Pain Acute  Goal: Optimal Pain Control and Function  Outcome: Progressing  Intervention: Prevent or Manage Pain  Recent Flowsheet Documentation  Taken 5/29/2025 0400 by Marguerite Pabon RN  Sensory Stimulation Regulation:   auditory stimulation minimized   care clustered   quiet environment promoted  Medication Review/Management: medications reviewed  Taken 5/29/2025 0000 by Marguerite Pabon RN  Sensory Stimulation Regulation:   auditory stimulation minimized   care clustered   quiet environment promoted  Medication Review/Management: medications reviewed     Problem: Fall Injury Risk  Goal: Absence of Fall and Fall-Related Injury  Outcome: Met  Intervention: Identify and Manage Contributors  Recent Flowsheet Documentation  Taken 5/29/2025 0400 by Marguerite Pabon RN  Medication Review/Management: medications reviewed  Taken 5/29/2025 0000 by Marguerite Pabon RN  Medication Review/Management: medications reviewed  Intervention: Promote Injury-Free Environment  Recent Flowsheet Documentation  Taken 5/29/2025 0400 by Marguerite Pabon RN  Safety Promotion/Fall Prevention:   activity supervised   assistive device/personal items within reach   clutter free environment maintained   lighting adjusted   mobility aid in reach   nonskid shoes/slippers when out of bed   patient and family education  Taken 5/29/2025 0000 by Marguerite Pabon RN  Safety Promotion/Fall Prevention:   activity  supervised   assistive device/personal items within reach   clutter free environment maintained   lighting adjusted   mobility aid in reach   nonskid shoes/slippers when out of bed   patient and family education

## 2025-05-29 NOTE — ANESTHESIA CARE TRANSFER NOTE
Patient: Omayra Malone    Procedure: Procedure(s):  Esophagoscopy, gastroscopy, duodenoscopy (EGD), combined       Diagnosis: Anemia [D64.9]  Diagnosis Additional Information: No value filed.    Anesthesia Type:   MAC     Note:    Oropharynx: oropharynx clear of all foreign objects and spontaneously breathing  Level of Consciousness: awake  Oxygen Supplementation: face mask  Level of Supplemental Oxygen (L/min / FiO2): 6  Independent Airway: airway patency satisfactory and stable  Dentition: dentition unchanged  Vital Signs Stable: post-procedure vital signs reviewed and stable  Report to RN Given: handoff report given  Patient transferred to: PACU    Handoff Report: Identifed the Patient, Identified the Reponsible Provider, Reviewed the pertinent medical history, Discussed the surgical course, Reviewed Intra-OP anesthesia mangement and issues during anesthesia, Set expectations for post-procedure period and Allowed opportunity for questions and acknowledgement of understanding      Vitals:  Vitals Value Taken Time   BP     Temp     Pulse     Resp     SpO2         Electronically Signed By: DYLON Evangelista CRNA  May 29, 2025  11:28 AM

## 2025-05-29 NOTE — PROGRESS NOTES
[facility]    Medicine Progress Note - Hospitalist Service    Date of Admission:  5/22/2025    Assessment & Plan   Omayra Malone is a 60 year old female with history of hypertension, hepatitis C, liver cirrhosis, chronic anemia, obesity status post gastric bypass, bipolar affective disorder, MEREDITH, hypothyroidism, osteoarthritis admitted on 5/22/2025 for shortness of breath, palpitations.     Acute heart failure with reduced EF of 35%, decompensated  Atrial flutter with RVR-status post DCCV on 5/22, flipped to normal sinus rhythm, now in A flutter  Acute hypoxic respiratory failure multifactorial from heart failure exacerbation, atrial flutter, Anemia, obstructive sleep apnea, OHS, deconditioning.  Moderate tricuspid regurgitation.  Mild pulmonary hypertension.  Mild to moderate MR.  Hypertension.  *Patient resident of [facility], presented with shortness of breath and palpitations.  Patient initially denied drug use,  urine positive for cocaine.  Reports recent use.  *On EMS arrival the patient was found to be in SVT at a pulse of 176. En route to the ED they attempted 6mg Adenosine, followed shortly after by another 12 mg with no success. -SpO2 was 94% en route to the ED. in ED heart rate in 170s, blood pressure in 120s systolic.  Sodium 141, potassium 3.9, creatinine 0.62.  *WBC 9.6, TSH 2.06.  N-terminal proBNP 1648.  Troponin high-sensitivity 12 >1  *Chest x-ray no definitive evidence for pulmonary edema, no concerns for infection.  *EKG sinus tachycardia, left axis deviation, ST-T wave changes.  *Echocardiogram with LVEF of 35%, moderate global hypokinesis of the left ventricle.  Right ventricle  mildly dilated.  Moderate mitral regurgitation, moderate tricuspid regurgitation.  Mild pulmonary hypertension.  *5/23 - was on intravenous heparin drip, initiated IV Lasix drip for diuresis.  *5/24 --in A-fib/flutter with rates in 140s to 150s.  Initiated intravenous amiodarone drip.    *5/27-  had chest pain, trop was negative. Seems reproducible. CT of the chest ordered by cardiology was negative for aortic dissection, no acute findings    - 5/29- rate controlled with HR <100 and is on Diltiazem drip which was initiated on 5/27 by EP  - Amio drip transition to PO amiodarone 400mg BID on 5/26/2025-> will defer to cards/EP, when to lower dose  - Continue intravenous heparin drip.  - Lasix drip-->> Torsemide 20mg BID on 5/26/2025, BUN/Cr slightly going up on 5/27, reduced to Torsemide 20mg daily   - PO metoprolol has been on hold by EP since initiation of dilt drip on 5/27  - Hold prior to admission amlodipine, hydrochlorothiazide to allow room for titration of rate controlling agents  - Continue telemetry monitoring.  - Intake output monitoring, daily weights  - EP evaluating, planning for possible ablation on 5/29, however unclear when this will occur.   ---> as below, discussed with Dr. Mitchell from GI and also Dr. Jama from cardiology, given current rate control, planning EGD this morning       Hypokalemia, hypomagnesemia.  Likely from diuresis.  Potassium 3.3, magnesium 1.4.  Keep potassium around 4, magnesium around 2, replacement protocols ordered     Acute on chronic anemia -likely dilutional, rule out blood loss.  Microcytic anemia, severe iron deficiency.  Vitamin B12 deficiency.  Hemoglobin 8.0 from previous 11.8 1-year ago, patient denies bleeding or melena. Does have history of gastric bypass does not appear to be on B12.    *Iron saturation index 4, vitamin B12 150.  CK29.  Folate 16.7.  UA no hematuria.  *IV Venofer for 2 doses -5/23, 5/24.   - started on oral iron, vitamin B12 supplements, continue  - change IV PPI to PO PPI  - Consent for blood transfusion obtained, transfuse for hemoglobin less than 8 or symptomatic.    - [facility] following, EGD has been on hold  due to ongoing flutter w/rvr. Given improved rate on 5/29, GI proceeding with EGD today     Cirrhosis secondary to  Hep C and NAFLD  Noted- per chart review, [facility] 2021 well-compensated cirrhosis due to Hep C (treated) and NAFLD.   IV pantoprazole as above.     LUE superficial vein thrombosis  *had prior IV access there and also recent extravasation of IV Protonix.  *House MEGGAN evaluated on 5/26 evening, refer to note for detail. Noted some erythema and edema  *LUE US (5/27)1.  Positive for cephalic vein thrombus at the distal humerus and antecubital fossa spanning greater than 5 cm.  2.  No deep venous thrombosis in the left upper extremity.  - erythema much improved on 5/28, no tenderness, has palpable cord over the antecubital fossa consistent with imaging finding of SVT  - continue supportive measures-elevation, warm compresses.     Left hand pain  Reports sudden pain while holding something a few weeks back.  Patient has been receiving diuresis with intravenous Lasix, significant improvement in swelling, erythema.  Continues to complain of pain.    Afebrile, no leukocytosis CRP 4.74, .  X-ray left hand / thumb (5/25)-Negative for fracture or dislocation. Moderate soft tissue swelling in the thumb. Moderate of osteoarthritis at the base of the thumb and the in CMC and STT joints, and throughout the IP joints with joint space narrowing and spurring.   Monitor closely.       Cocaine use.  Patient initially denied drug use, group home staff reported concern for cocaine use.  Urine drug screen positive for cocaine.  Patient reports recent use -with her boyfriend.  Emphasized need to consider abstinence, will need chemical dependency evaluation once closer to discharge.      Acute on chronic anxiety.  Bipolar affective disorder  Continue PTA lamictal 100mg daily  Continue PTA xanax PRN at reduce dose (does not take when she is prescribed oxycodone which she currently is for leg pain)  As needed Atarax 4 times a day     Chronic leg and back pain   Follows with [facility] pain clinic, chronically on oxycodone.  Continue PTA  "oxycodone 5mg q8prn, minimize narcotic use as able to.     History of hypothyroidism.  PTA not on meds.  TSH within normal limits     Medically complicated obesity s/p gastric bypass  Body mass index is 55.85 kg/m . Recommend outpatient follow up/ongoing weight loss.      MEREDITH  Does not use CPAP.  Consider sleep studies as outpatient.     Chronic doxycycline use  No clear indication on chart review, reports chronically used.  Continue for now      Physical deconditioning from medical illness.  PT, OT evaluation once above cardiac issue stablizes  Fall precautions.  Encourage ambulation out of bed as able to.     Nicotine use.  Reports intermittent vaping.  Emphasized need to consider abstinence, nicotine patch ordered          Diet: Fluid restriction 2000 ML FLUID  NPO for Medical/Clinical Reasons Except for: Meds    DVT Prophylaxis: Heparin   Stafford Catheter: Not present  Lines: None     Cardiac Monitoring: ACTIVE order. Indication: Tachyarrhythmias, acute (48 hours)  Code Status: Full Code      Clinically Significant Risk Factors          # Hypochloremia: Lowest Cl = 95 mmol/L in last 2 days, will monitor as appropriate       # Coagulation Defect: INR = 1.18 (Ref range: 0.85 - 1.15) and/or PTT = N/A, will monitor for bleeding       # Chronic heart failure with reduced ejection fraction: last echo with EF <40%           # Morbid Obesity: Estimated body mass index is 51.32 kg/m  as calculated from the following:    Height as of this encounter: 1.626 m (5' 4\").    Weight as of this encounter: 135.6 kg (299 lb).        # Financial/Environmental Concerns: none         Social Drivers of Health    Tobacco Use: High Risk (1/9/2025)    Received from [facility]    Patient History     Smoking Tobacco Use: Some Days     Smokeless Tobacco Use: Former          Disposition Plan     Medically Ready for Discharge: Anticipated in 2-4 Days             Clayton Freire MD  Hospitalist Service  M " [facility]  Securely message with [employer] (more info)  Text page via [employer] Paging/Directory   ______________________________________________________________________    Interval History   Patient denies shortness of breath, nausea or vomiting.   Rate currently <100 on PO amiodarone and dilt drip.   GI planning on EGD today.  Awaiting further cards/EP plans regarding a.flutter.       Physical Exam   Vital Signs: Temp: 98.4  F (36.9  C) Temp src: Oral BP: 119/70 Pulse: 69   Resp: 18 SpO2: 96 % O2 Device: Nasal cannula Oxygen Delivery: 2 LPM  Weight: 299 lbs 0 oz    General Appearance: Alert, awake and no apparent distress  Respiratory: clear to auscultation  Cardiovascular: tachycardic  GI: soft and non-tender  Skin: LUE--+edema, palpable cord, no significnat erythema over the anti cubital fossa, non-tender,       Medical Decision Making       MANAGEMENT DISCUSSED with the following over the past 24 hours: patient, RN, cardiology and GI   NOTE(S)/MEDICAL RECORDS REVIEWED over the past 24 hours: nursing note, cardiology note,   Tests ORDERED & REVIEWED in the past 24 hours:  - BMP  - CBC          Data     I have personally reviewed the following data over the past 24 hrs:    6.8  \   8.3 (L)   / 261     138 95 (L) 25.6 (H) /  111 (H)   4.7 30 (H) 0.97 (H) \       Imaging results reviewed over the past 24 hrs:   No results found for this or any previous visit (from the past 24 hours).

## 2025-05-29 NOTE — PROGRESS NOTES
[facility]  Electrophysiology Cardiology Progress Note  Date of Service: 05/29/2025  Primary Cardiologist: will be Dr. Cardoza, Dr. Jama (EP)    Assessment & Plan   Omayra Malone is a 60 year old female with past medical history significant for liver cirrhosis hepatitis C, polysubstance abuse (recent cocaine), MEREDITH, hypothyroidism, hypertension, chronic anemia residing in a group home who was admitted on 5/22/2025 with 2:1 atrial flutter RVR requiring cardioversion in the ED with recurrence, subsequently started on IV (now oral) amiodarone, with ongoing intermittent RVR despite ongoing diltiazem infusion. Low dose metoprolol has been on hold given intermittent hypotension.     Echocardiogram this admission revealed new cardiomyopathy with LVEF 35%. Patient significantly volume overloaded on admission, has diuresed ~40#.     Plan has been for ICE guided atrial flutter ablation, completion has been limited by scheduling. Currently NPO with plan for EGD this AM.     Assessment:  1.  Atrial flutter RVR, onset unknown. Patient with minimal symptoms when rates controlled. On heparin gtt. Not a great candidate for long term amiodarone given cirrhosis.   2.  New acute HFrEF and cardiomyopathy, presumed tachycardia mediated. LVEF 35%. Has diuresed ~40# since admit  3.  New but stable anemia, plan is for EGD today  4.  Polysubstance abuse, most recently cocaine     Plan:   Medications   -resume metoprolol tartrate 25 mg twice daily and wean diltiazem infusion   -continue remainder of cardiac medications unchanged   Plan for ICE versus OK guided atrial flutter ablation tomorrow, 5/30/2025. Patient is not currently a candidate for OK due to ?esophageal varices. If EGD today reveals no varices can consider OK prior to ablation.    -NPO at midnight  EP will continue to follow    Huyen Perez PA-C  [facility]  Pager: [phone]    Interval History   No complaints, resting in bed.  About to head down for EGD    Physical Exam   Temp: 98.4  F (36.9  C) Temp src: Oral BP: 119/70 Pulse: 69   Resp: 18 SpO2: 96 % O2 Device: Nasal cannula Oxygen Delivery: 2 LPM  Vitals:    05/26/25 1140 05/28/25 0630 05/29/25 0641   Weight: 132.8 kg (292 lb 11.2 oz) (!) 136.1 kg (300 lb 1.6 oz) 135.6 kg (299 lb)       GEN: well nourished, in no acute distress.  HEENT:  Pupils equal, round. Sclerae nonicteric.   NECK: Supple, no masses appreciated. Unable to accurately assess JVP secondary to body habitus  C/V:  irregular rhythm, controlled rates    RESP: Respirations are unlabored. Clear to auscultation bilaterally without wheezing, rales, or rhonchi.  GI: Abdomen soft, nontender.  EXTREM: no pitting LE edema.  NEURO: Alert and oriented, cooperative.  SKIN: Warm and dry.     Medications   Current Facility-Administered Medications   Medication Dose Route Frequency Provider Last Rate Last Admin    diltiazem (CARDIZEM) 125 mg in sodium chloride 0.9 % 125 mL infusion  5-15 mg/hr Intravenous Continuous Merlyn Lebron PA-C 10 mL/hr at 05/29/25 0005 10 mg/hr at 05/29/25 0005    heparin 25,000 units in 0.45% NaCl 250 mL ANTICOAGULANT infusion  0-5,000 Units/hr Intravenous Continuous Loco Murray MD 18 mL/hr at 05/29/25 0831 1,800 Units/hr at 05/29/25 0831    lactated ringers infusion   Intravenous Continuous Merlyn Lebron PA-C        Patient is already receiving anticoagulation with heparin, enoxaparin (LOVENOX), warfarin (COUMADIN)  or other anticoagulant medication   Does not apply Continuous PRN Loco Murray MD        Patient to take 2/3 (66%) of the usual morning dose of NPH (N); Nurse to give 2/3 (66%) of the usual morning dose of intermediate acting insulin NPH (N).   Does not apply Continuous PRN Merlyn Lebron PA-C        Patient to take 80% of the usual dose evening before procedure. Nurse to give 80% of the usual dose of long acting insulin the evening before the procedure.  insulin glargine (LANTUS, TOUJEO), insulin detemir (LEVEMIR), insulin degludec (TRESIBA).   Does not apply Continuous LAURENN Merlyn Lebron PA-C         Current Facility-Administered Medications   Medication Dose Route Frequency Provider Last Rate Last Admin    amiodarone (PACERONE) tablet 400 mg  400 mg Oral BID Marcello Benavides MD   400 mg at 05/29/25 0827    cyanocobalamin (VITAMIN B-12) tablet 500 mcg  500 mcg Oral Daily Loki Anderson MD   500 mcg at 05/29/25 0827    doxycycline hyclate (VIBRAMYCIN) capsule 100 mg  100 mg Oral BID Loco Murray MD   100 mg at 05/29/25 0827    lamoTRIgine (LaMICtal) tablet 100 mg  100 mg Oral Daily Loco Murray MD   100 mg at 05/29/25 0827    [Held by provider] metoprolol tartrate (LOPRESSOR) tablet 25 mg  25 mg Oral BID Myriam Moreno APRN CNP   25 mg at 05/27/25 0824    nicotine (NICODERM CQ) 7 MG/24HR 24 hr patch 1 patch  1 patch Transdermal Daily Loki Anderson MD        pantoprazole (PROTONIX) EC tablet 40 mg  40 mg Oral BID AC Clayotn Freire MD   40 mg at 05/29/25 0827    sodium chloride (PF) 0.9% PF flush 3 mL  3 mL Intracatheter Q8H PHU Loco Murray MD   3 mL at 05/29/25 0642    torsemide (DEMADEX) tablet 20 mg  20 mg Oral Daily Clayton Freire MD   20 mg at 05/29/25 0827       Data   Last 24 hours labs reviewed     Tele: AFL 70s    Echo 5/23/2025  1. The left ventricle is moderately dilated. The visual ejection fraction is  estimated at 35%. There is moderate global hypokinesia of the left ventricle.  2. The right ventricle is mildly dilated. Mildly decreased right ventricular  systolic function  3. There is mild to moderate (1-2+) mitral regurgitation.  4. There is moderate (2+) tricuspid regurgitation.  5. Mild (35-45mmHg) pulmonary hypertension is present. The right ventricular  systolic pressure is approximated at 41mmHg plus the right atrial pressure.     Echo 8/2024 from [facility] reported EF 55%.

## 2025-05-29 NOTE — PLAN OF CARE
"Goal Outcome Evaluation: Pt is A&Ox4, denies pain, VSS and on tele A-Fib/flutter CVR, on Diltiazem 10 mg/hr, and Heparin gtt at 1800 units/hr, on RA and LS bases diminished, up with 1 assist and RW, had large BM, Purewick in place. L U arm IV infiltration - improving with warm packs on. Plan to keep NPO after midnight for A-Fib/flutter ablation in AM. Pending EGD post ablation.      Plan of Care Reviewed With: patient    Overall Patient Progress: no changeOverall Patient Progress: no change     Patient Name: Kimberley  MRN: [id number]  Date of Admission: 5/22/2025  Reason for Admission: Increased SOB and palpitation , found to bee in A-Fib Flutter RVR.  [place] [facility]     Vitals:   BP Readings from Last 1 Encounters:   05/28/25 126/79     Pulse Readings from Last 1 Encounters:   05/28/25 73     Wt Readings from Last 1 Encounters:   05/28/25 (!) 136.1 kg (300 lb 1.6 oz)     Ht Readings from Last 1 Encounters:   05/22/25 1.626 m (5' 4\")     Estimated body mass index is 51.51 kg/m  as calculated from the following:    Height as of this encounter: 1.626 m (5' 4\").    Weight as of this encounter: 136.1 kg (300 lb 1.6 oz).  Temp Readings from Last 1 Encounters:   05/28/25 98.3  F (36.8  C) (Oral)       Pain: Pain goal - no pain Pain Rating none Effective pain medication/regimen yes    CV Surgery Patient: No    Assessment    Resp: RA  Telemetry: A-Fib/Flutter  Neuro: A&Ox4  GI/: Purewick, had large BM  Skin/Wounds: scattered bruises  Lines/Drains: Purewick, PIV x2  Activity: up with 1 assist, GB and RW  Sleep:   Abnormal Labs:     Aggression Stop Light: Green          Patient Care Plan:  keep NPO after midnight for A-Fib/flutter ablation in AM. Pending EGD post ablation.          "

## 2025-05-29 NOTE — PROGRESS NOTES
"[facility] Progress Note     IMPRESSION:  Atrial flutter, heart failure, rate now controlled  Multifactorial anemia as below  Compensated cirrhosis    On dilt, heparin gtt - no signs of GI blood loss.      Anemia is attributable to GBP given severe B12, and concomitant iron deficiency.    RECOMMENDATIONS:  Attempting to coordinate possible EGD today unless cardiology has plans for further EP workup/ablation.  The patient is n.p.o., but we would not want to interfere with cardiology's plans to address her arrhythmia.  She is tentatively scheduled for endoscopy this morning at 1030 and I am awaiting call back from EP MEGGAN - my cell [phone].    Of note, IV iron and monthly IM B12 should be arranged at discharge.    Addendum:  Will proceed with EGD today.     Total time spent in chart review, direct medical discussion, examination, and documentation was 20 minutes    Galen Garvin DO   [facility]  Office [phone]    ________________________________________________________________________      SUBJECTIVE:  Feeling well without abd pain, no melena/hematochezia.      OBJECTIVE:  /70 (BP Location: Left arm)   Pulse 69   Temp 98.4  F (36.9  C) (Oral)   Resp 18   Ht 1.626 m (5' 4\")   Wt 135.6 kg (299 lb)   SpO2 96%   BMI 51.32 kg/m    Temp (24hrs), Av.5  F (36.9  C), Min:98.3  F (36.8  C), Max:98.9  F (37.2  C)    Patient Vitals for the past 72 hrs:   Weight   25 0641 135.6 kg (299 lb)   25 0630 (!) 136.1 kg (300 lb 1.6 oz)   25 1140 132.8 kg (292 lb 11.2 oz)       Intake/Output Summary (Last 24 hours) at 2025 0839  Last data filed at 2025 0641  Gross per 24 hour   Intake 1304 ml   Output 2350 ml   Net -1046 ml        PHYSICAL EXAM  GEN: Alert, oriented x3, communicative and in NAD.      Additional Data:  I have reviewed the patient's new clinical lab results:     Recent Labs   Lab Test 25  0607 25  0600 25  0519 25  0528 " 05/26/25  0628   WBC 6.8 6.8  --   --  10.0   HGB 8.3* 8.7*  --  8.7* 8.6*   MCV 76* 74*  --  76* 72*    269  --   --  292   INR  --   --  1.18*  --   --      Recent Labs   Lab Test 05/29/25  0607 05/28/25  0519 05/27/25  0528   POTASSIUM 4.7 4.6 4.9  4.9   CHLORIDE 95* 95* 93*   CO2 30* 31* 29   BUN 25.6* 27.9* 29.1*   ANIONGAP 13 12 14     Recent Labs   Lab Test 05/23/25  1137 05/22/25  1743 05/15/24  1941 02/01/24  1301   ALBUMIN  --  3.7 4.0  --    BILITOTAL  --  0.2 0.2  --    ALT  --  12 28  --    AST  --  15 18  --    PROTEIN Negative  --   --  Negative
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
## 2025-05-29 NOTE — PROGRESS NOTES
Alert and oriented, vss, 2 lpm nc while asleep. Rate controlled a fib on dilt and heparin. NPO as of midnight for procedure.

## 2025-05-30 LAB
ANION GAP SERPL CALCULATED.3IONS-SCNC: 11 MMOL/L (ref 7–15)
BUN SERPL-MCNC: 22.5 MG/DL (ref 8–23)
CALCIUM SERPL-MCNC: 9.7 MG/DL (ref 8.8–10.4)
CHLORIDE SERPL-SCNC: 96 MMOL/L (ref 98–107)
CREAT SERPL-MCNC: 0.97 MG/DL (ref 0.51–0.95)
EGFRCR SERPLBLD CKD-EPI 2021: 66 ML/MIN/1.73M2
GLUCOSE SERPL-MCNC: 109 MG/DL (ref 70–99)
HCO3 SERPL-SCNC: 30 MMOL/L (ref 22–29)
HGB BLD-MCNC: 8.5 G/DL (ref 11.7–15.7)
MAGNESIUM SERPL-MCNC: 2.1 MG/DL (ref 1.7–2.3)
MCV RBC AUTO: 76 FL (ref 78–100)
POTASSIUM SERPL-SCNC: 4.8 MMOL/L (ref 3.4–5.3)
SODIUM SERPL-SCNC: 137 MMOL/L (ref 135–145)
UFH PPP CHRO-ACNC: 0.38 IU/ML (ref ?–1.1)

## 2025-05-30 PROCEDURE — 250N000013 HC RX MED GY IP 250 OP 250 PS 637: Performed by: INTERNAL MEDICINE

## 2025-05-30 PROCEDURE — 80048 BASIC METABOLIC PNL TOTAL CA: CPT | Performed by: INTERNAL MEDICINE

## 2025-05-30 PROCEDURE — 93005 ELECTROCARDIOGRAM TRACING: CPT

## 2025-05-30 PROCEDURE — 36415 COLL VENOUS BLD VENIPUNCTURE: CPT | Performed by: HOSPITALIST

## 2025-05-30 PROCEDURE — 99232 SBSQ HOSP IP/OBS MODERATE 35: CPT | Mod: FS | Performed by: INTERNAL MEDICINE

## 2025-05-30 PROCEDURE — 85018 HEMOGLOBIN: CPT | Performed by: INTERNAL MEDICINE

## 2025-05-30 PROCEDURE — 83735 ASSAY OF MAGNESIUM: CPT | Performed by: HOSPITALIST

## 2025-05-30 PROCEDURE — 250N000013 HC RX MED GY IP 250 OP 250 PS 637

## 2025-05-30 PROCEDURE — 85520 HEPARIN ASSAY: CPT | Performed by: HOSPITALIST

## 2025-05-30 PROCEDURE — 99233 SBSQ HOSP IP/OBS HIGH 50: CPT | Performed by: INTERNAL MEDICINE

## 2025-05-30 PROCEDURE — 210N000001 HC R&B IMCU HEART CARE

## 2025-05-30 RX ORDER — AMIODARONE HYDROCHLORIDE 200 MG/1
200 TABLET ORAL 2 TIMES DAILY
Status: DISCONTINUED | OUTPATIENT
Start: 2025-06-06 | End: 2025-05-30

## 2025-05-30 RX ORDER — AMIODARONE HYDROCHLORIDE 200 MG/1
200 TABLET ORAL 2 TIMES DAILY
Status: DISCONTINUED | OUTPATIENT
Start: 2025-06-06 | End: 2025-06-02 | Stop reason: HOSPADM

## 2025-05-30 RX ORDER — AMIODARONE HYDROCHLORIDE 200 MG/1
400 TABLET ORAL 2 TIMES DAILY
Status: DISCONTINUED | OUTPATIENT
Start: 2025-05-30 | End: 2025-06-02 | Stop reason: HOSPADM

## 2025-05-30 RX ORDER — METOPROLOL SUCCINATE 25 MG/1
25 TABLET, EXTENDED RELEASE ORAL 2 TIMES DAILY
Status: DISCONTINUED | OUTPATIENT
Start: 2025-05-30 | End: 2025-06-02 | Stop reason: HOSPADM

## 2025-05-30 RX ADMIN — AMIODARONE HYDROCHLORIDE 400 MG: 200 TABLET ORAL at 20:06

## 2025-05-30 RX ADMIN — DOXYCYCLINE HYCLATE 100 MG: 100 CAPSULE ORAL at 20:06

## 2025-05-30 RX ADMIN — OXYCODONE HYDROCHLORIDE 5 MG: 5 TABLET ORAL at 10:28

## 2025-05-30 RX ADMIN — TORSEMIDE 20 MG: 20 TABLET ORAL at 08:59

## 2025-05-30 RX ADMIN — DOXYCYCLINE HYCLATE 100 MG: 100 CAPSULE ORAL at 08:59

## 2025-05-30 RX ADMIN — LAMOTRIGINE 100 MG: 100 TABLET ORAL at 08:59

## 2025-05-30 RX ADMIN — METOPROLOL SUCCINATE 25 MG: 25 TABLET, EXTENDED RELEASE ORAL at 20:06

## 2025-05-30 RX ADMIN — AMIODARONE HYDROCHLORIDE 400 MG: 200 TABLET ORAL at 09:01

## 2025-05-30 RX ADMIN — PANTOPRAZOLE SODIUM 40 MG: 40 TABLET, DELAYED RELEASE ORAL at 16:56

## 2025-05-30 RX ADMIN — PANTOPRAZOLE SODIUM 40 MG: 40 TABLET, DELAYED RELEASE ORAL at 09:00

## 2025-05-30 RX ADMIN — FERROUS SULFATE TAB 325 MG (65 MG ELEMENTAL FE) 325 MG: 325 (65 FE) TAB at 10:29

## 2025-05-30 RX ADMIN — OXYCODONE HYDROCHLORIDE 5 MG: 5 TABLET ORAL at 21:04

## 2025-05-30 RX ADMIN — METOPROLOL SUCCINATE 25 MG: 25 TABLET, EXTENDED RELEASE ORAL at 08:59

## 2025-05-30 RX ADMIN — CYANOCOBALAMIN TAB 1000 MCG 1000 MCG: 1000 TAB at 08:59

## 2025-05-30 ASSESSMENT — ACTIVITIES OF DAILY LIVING (ADL)
ADLS_ACUITY_SCORE: 48
ADLS_ACUITY_SCORE: 48
ADLS_ACUITY_SCORE: 47
ADLS_ACUITY_SCORE: 48
ADLS_ACUITY_SCORE: 47
ADLS_ACUITY_SCORE: 48
ADLS_ACUITY_SCORE: 47

## 2025-05-30 NOTE — PROGRESS NOTES
Orientations:  A&O x4  Vitals/Pain: stable on 1-2 L O2 NC at bedtime. Pain managed with oxy x1  Resp: clear, dim in bases.  Tele: a-flutter CVR  Diet: NPO at midnight.  Lines/Drains: L PIV, SL. R PIV infusing hep gtt at 1800. Purewick in place.   Skin/Wounds: Blanchable redness,scattered bruising.   GI/: active BS, no BM, passing flatus. Adequately voiding via purewick.   Labs: Abnormal/Trends, Hgb(8.5), Hep Xa- 5/31 am. Electrolyte Replacement- K/Mg protocols recheck- 5/31 am.   Ambulation/Assist:  Ax1 with GBW  Sleep Quality: good  Plan: plan to do OK, ablation.

## 2025-05-30 NOTE — PROGRESS NOTES
"[facility] Progress Note     Interval History:  Hgb stable 8.5. No sign of GI bleeding since the endoscopy. Denies abdominal pain/n/v. Tolerating diet.     Physical Exam:    /78   Pulse 110   Temp 97.7  F (36.5  C) (Oral)   Resp 18   Ht 1.626 m (5' 4\")   Wt 136.1 kg (300 lb)   SpO2 97%   BMI 51.49 kg/m      Constitutional: No acute distress  Cardiovascular: RRR, normal S1/S2  Respiratory: Effort normal, CTA bilaterally  Abdomen: Soft, nondistended, nontender    Laboratory Data  Recent Labs   Lab Test 05/30/25  0555 05/29/25  0607 05/28/25  0600 05/28/25  0519 05/27/25  0528 05/26/25  0628   WBC  --  6.8 6.8  --   --  10.0   HGB 8.5* 8.3* 8.7*  --    < > 8.6*   MCV 76* 76* 74*  --    < > 72*   PLT  --  261 269  --   --  292   INR  --   --   --  1.18*  --   --     < > = values in this interval not displayed.     Recent Labs   Lab Test 05/30/25  0555 05/29/25  0607 05/28/25  0519    138 138   POTASSIUM 4.8 4.7 4.6   CHLORIDE 96* 95* 95*   CO2 30* 30* 31*   BUN 22.5 25.6* 27.9*   CR 0.97* 0.97* 0.98*   ANIONGAP 11 13 12   KODI 9.7 9.6 9.4     Recent Labs   Lab Test 05/23/25  1137 05/22/25  1743 05/15/24  1941 02/01/24  1301   ALBUMIN  --  3.7 4.0  --    BILITOTAL  --  0.2 0.2  --    DBIL  --  0.09  --   --    ALT  --  12 28  --    AST  --  15 18  --    ALKPHOS  --  124 98  --    PROTEIN Negative  --   --  Negative       Endoscopic Workup  EGD 5/29/2025  Findings:       The esophagus was normal.        Post- gastric bypass anatomy - with a single 2 mm angioectasia with        bleeding was found in the gastric pouch. For hemostasis, one hemostatic        clip was successfully placed. There was no bleeding at the end of the        procedure despite aggressive irrigation and several minute observation        post clip placement.        The GJ anastomosis was minimally stenotic and friable without active        bleeding. The proximal jejunum was normal.                                                              "                       Impression:               - No evidence of varices or portal HTN gastropathy.                             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                             - Follow/replace Hgb < 7                             - Low threshold to repeat EGD off heparin if                             melena, hematochezia develops.                             - IV iron and B12 supplementation as outpt, per GI                             service note today                             - Will follow peripherally                             - Medicine team updated via text page   Assessment & Plan:  60 year old with compensated cirrhosis secondary to hep C (treated) gastric bypass, cholecystectomy, thyroidectomy, obesity, bipolar disorder, admitted w/ a flutter, heart failure and anemia/B12 deficiency. EGD 5/29 showed an actively bleeding AVM in gastric pouch (clipped) as well as a stenotic/friable GJ anastomosis without active bleeding.     Plan  - Continue PPI indefinitely   - Avoid NSAIDS   - Follow/replace Hgb to keep > 7    -IV iron and monthly IM B12 supplementation should be arranged at discharge   - Liver clinic outpatient follow up. We will call to arrange.   - Low threshold to repeat EGD off heparin if melena, hematochezia develops.   - No further GI recommendations at this time. We will sign off. Please call with any concerns or questions.    Discussed with Dr. Bharathi Walton, CNP  [facility]  Cell: [phone]  Office: [phone]

## 2025-05-30 NOTE — PROGRESS NOTES
[facility]  Electrophysiology Cardiology Progress Note  Date of Service: 05/30/2025  Primary Cardiologist: will be Dr. Cardoza, Dr. Jama (EP)    Assessment & Plan   Omayra Malone is a 60 year old female with past medical history significant for liver cirrhosis hepatitis C, polysubstance abuse (recent cocaine), MEREDITH, hypothyroidism, hypertension, chronic anemia residing in a group home who was admitted on 5/22/2025 with 2:1 atrial flutter RVR requiring cardioversion in the ED with recurrence, subsequently started on IV (now oral) amiodarone, with ongoing intermittent RVR despite ongoing diltiazem infusion. Low dose metoprolol has been on hold given intermittent hypotension.     Echocardiogram this admission revealed new cardiomyopathy with LVEF 35%. Patient significantly volume overloaded on admission, has diuresed ~40#.     Plan has been for atrial flutter ablation.  However, EGD yesterday revealed active bleeding.  Dr. Jama has recommended a rate control approach for now, so we are able to discontinue anticoagulation which he feels is safest for the patient.  Diltiazem infusion has been weaned and rates remain well-controlled.    Assessment:  1.  Atrial flutter RVR, onset unknown. Patient with minimal symptoms when rates controlled. Opting for rate control for now, with plan for potential ablation as an outpatient pending ability to tolerate anticoagulation.  2.  New acute HFrEF and cardiomyopathy, presumed tachycardia mediated. LVEF 35%. Has diuresed ~40# since admit.  Weight remains stable on current dose of torsemide.  3.  New but stable anemia, with active bleeding noted on EGD 5/29  4.  Polysubstance abuse, most recently cocaine     Plan:   Okay to eat from an EP perspective.  Medications   -transition metoprolol tartrate to Toprol XL 25 mg twice daily    -continue amiodarone at 400 mg BID x 1 more week, then decrease to 200 mg BID.    -Discontinue heparin  Outpatient Ziopatch monitor,  will mail to patient  Repeat echocardiogram ~1 month after optimal rate control has been achieved  EP follow-up to discuss long term plan including amiodarone duration and Watchman candidacy.  Amiodarone is not a great long-term option for this patient with cirrhosis.  However, unclear if/when she will be able to tolerate anticoagulation.  Ideally, patient would undergo atrial flutter ablation with concomitant Watchman device implant.  Outpatient follow-up has been arranged for ongoing discussion.  Outpatient general cardiology follow-up in 4 weeks with labs to ensure ongoing stability of volume status  EP will sign off. Please call with questions.     Huyen Perez PA-C  [facility]  Pager: [phone]    Interval History   No complaints, resting in bed. Asking to discharge    Physical Exam   Temp: 97.9  F (36.6  C) Temp src: Oral BP: 111/65 Pulse: 90   Resp: 20 SpO2: 95 % O2 Device: Nasal cannula Oxygen Delivery: 2 LPM  Vitals:    05/28/25 0630 05/29/25 0641 05/30/25 0624   Weight: (!) 136.1 kg (300 lb 1.6 oz) 135.6 kg (299 lb) 136.1 kg (300 lb)       GEN: well nourished, in no acute distress.  HEENT:  Pupils equal, round. Sclerae nonicteric.   NECK: Supple, no masses appreciated. Unable to accurately assess JVP secondary to body habitus  C/V:  irregular rhythm, controlled rates    RESP: Respirations are unlabored. Clear to auscultation bilaterally without wheezing, rales, or rhonchi.  GI: Abdomen soft, nontender.  EXTREM: no pitting LE edema.  NEURO: Alert and oriented, cooperative.  SKIN: Warm and dry.     Medications   Current Facility-Administered Medications   Medication Dose Route Frequency Provider Last Rate Last Admin    diltiazem (CARDIZEM) 125 mg in sodium chloride 0.9 % 125 mL infusion  5-15 mg/hr Intravenous Continuous Merlyn Taylor PA-C   Stopped at 05/29/25 1349    heparin 25,000 units in 0.45% NaCl 250 mL ANTICOAGULANT infusion  0-5,000 Units/hr Intravenous Continuous Terri  MD Loco 18 mL/hr at 05/29/25 2215 1,800 Units/hr at 05/29/25 2215    lactated ringers infusion   Intravenous Continuous Merlyn Taylor PA-C        Patient is already receiving anticoagulation with heparin, enoxaparin (LOVENOX), warfarin (COUMADIN)  or other anticoagulant medication   Does not apply Continuous PRN Loco Murray MD        Patient to take 2/3 (66%) of the usual morning dose of NPH (N); Nurse to give 2/3 (66%) of the usual morning dose of intermediate acting insulin NPH (N).   Does not apply Continuous PRN Merlyn Taylor PA-C        Patient to take 80% of the usual dose evening before procedure. Nurse to give 80% of the usual dose of long acting insulin the evening before the procedure. insulin glargine (LANTUS, TOUJEO), insulin detemir (LEVEMIR), insulin degludec (TRESIBA).   Does not apply Continuous PRN Merlyn Taylor PA-C         Current Facility-Administered Medications   Medication Dose Route Frequency Provider Last Rate Last Admin    amiodarone (PACERONE) tablet 400 mg  400 mg Oral BID Marcello Benavides MD   400 mg at 05/29/25 2001    cyanocobalamin (VITAMIN B-12) tablet 1,000 mcg  1,000 mcg Oral Daily Clayton Freire MD        doxycycline hyclate (VIBRAMYCIN) capsule 100 mg  100 mg Oral BID Loco Murray MD   100 mg at 05/29/25 1959    ferrous sulfate (FEROSUL) tablet 325 mg  325 mg Oral Every Other Day Clayton Freire MD        lamoTRIgine (LaMICtal) tablet 100 mg  100 mg Oral Daily Loco Murray MD   100 mg at 05/29/25 0827    metoprolol tartrate (LOPRESSOR) tablet 25 mg  25 mg Oral BID Huyen Perez PA-C   25 mg at 05/29/25 2000    nicotine (NICODERM CQ) 7 MG/24HR 24 hr patch 1 patch  1 patch Transdermal Daily Loki Anderson MD        pantoprazole (PROTONIX) EC tablet 40 mg  40 mg Oral BID AC Clayton Freire MD   40 mg at 05/29/25 1700    sodium chloride (PF) 0.9% PF flush 3 mL  3 mL Intracatheter Q8H [facility] Loco Murray MD   3 mL at 05/29/25 0603     torsemide (DEMADEX) tablet 20 mg  20 mg Oral Daily Clayton Freire MD   20 mg at 05/29/25 0827       Data   Last 24 hours labs reviewed     Tele: AFL 60-90s    Echo 5/23/2025  1. The left ventricle is moderately dilated. The visual ejection fraction is  estimated at 35%. There is moderate global hypokinesia of the left ventricle.  2. The right ventricle is mildly dilated. Mildly decreased right ventricular  systolic function  3. There is mild to moderate (1-2+) mitral regurgitation.  4. There is moderate (2+) tricuspid regurgitation.  5. Mild (35-45mmHg) pulmonary hypertension is present. The right ventricular  systolic pressure is approximated at 41mmHg plus the right atrial pressure.     Echo 8/2024 from [facility] reported EF 55%.

## 2025-05-30 NOTE — PROGRESS NOTES
[facility]    Medicine Progress Note - Hospitalist Service    Date of Admission:  5/22/2025    Assessment & Plan   Omayra Malone is a 60 year old female with history of hypertension, hepatitis C, liver cirrhosis, chronic anemia, obesity status post gastric bypass, bipolar affective disorder, MEREDITH, hypothyroidism, osteoarthritis admitted on 5/22/2025 for shortness of breath, palpitations.     Acute heart failure with reduced EF of 35%, decompensated  Atrial flutter with RVR-status post DCCV on 5/22, flipped to normal sinus rhythm, now in A flutter  Acute hypoxic respiratory failure multifactorial from heart failure exacerbation, atrial flutter, Anemia, obstructive sleep apnea, OHS, deconditioning.  Moderate tricuspid regurgitation.  Mild pulmonary hypertension.  Mild to moderate MR.  Hypertension.  *Patient resident of [facility], presented with shortness of breath and palpitations.  Patient initially denied drug use,  urine positive for cocaine.  Reports recent use.  *On EMS arrival the patient was found to be in SVT at a pulse of 176. En route to the ED they attempted 6mg Adenosine, followed shortly after by another 12 mg with no success. -SpO2 was 94% en route to the ED. in ED heart rate in 170s, blood pressure in 120s systolic.  Sodium 141, potassium 3.9, creatinine 0.62.  *WBC 9.6, TSH 2.06.  N-terminal proBNP 1648.  Troponin high-sensitivity 12 >1  *Chest x-ray no definitive evidence for pulmonary edema, no concerns for infection.  *EKG sinus tachycardia, left axis deviation, ST-T wave changes.  *Echocardiogram with LVEF of 35%, moderate global hypokinesis of the left ventricle.  Right ventricle  mildly dilated.  Moderate mitral regurgitation, moderate tricuspid regurgitation.  Mild pulmonary hypertension.  *5/23 - was on intravenous heparin drip, initiated IV Lasix drip for diuresis.  *5/24 --in A-fib/flutter with rates in 140s to 150s.  Initiated intravenous amiodarone drip.    *5/27-  had chest pain, trop was negative. Seems reproducible. CT of the chest ordered by cardiology was negative for aortic dissection, no acute findings     - 5/29- rate controlled with HR <100 and is on Diltiazem drip which was initiated on 5/27 by EP and transition to p.o. metoprolol tartrate.  Changed to Toprol-XL 25 mg twice daily on 05/30.  - Amio drip transition to PO amiodarone 400mg BID on 5/26/2025 . Plan to  -continue amiodarone at 400 mg BID x 1 more week (6/6) then decrease to 200 mg BID     -Had been on intravenous heparin drip that is discontinued 05/30  - Lasix drip-->> Torsemide 20mg BID on 5/26/2025, BUN/Cr slightly going up on 5/27, reduced to Torsemide 20mg daily   - Hold prior to admission amlodipine, hydrochlorothiazide to allow room for titration of rate controlling agents    5/30: Continued tachycardia in 130s.  Requested EP to re-review      Hypokalemia, hypomagnesemia.  Likely from diuresis.  Keep potassium around 4, magnesium around 2, replacement protocols ordered     Acute on chronic anemia -likely dilutional, rule out blood loss.  Microcytic anemia, severe iron deficiency.  Vitamin B12 deficiency.  Hemoglobin 8.0 from previous 11.8 1-year ago, patient denies bleeding or melena. Does have history of gastric bypass does not appear to be on B12.    *Iron saturation index 4, vitamin B12 150.  CK29.  Folate 16.7.  UA no hematuria.  *IV Venofer for 2 doses -5/23, 5/24.   - started on oral iron, vitamin B12 supplements, continue  - change IV PPI to PO PPI  - Consent for blood transfusion obtained, transfuse for hemoglobin less than 8 or symptomatic.    - [facility] following, status post EGD 05/29: Stenotic, friable GJ anastomosis without active bleeding.  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    pantoprazole as above.     LUE superficial vein thrombosis  *had prior IV access there and also recent extravasation of IV Protonix.  *House MEGGAN evaluated on 5/26 evening, refer to note for detail. Noted some erythema and edema  *LUE US (5/27)1.  Positive for cephalic vein thrombus at the distal humerus and antecubital fossa spanning greater than 5 cm.  2.  No deep venous thrombosis in the left upper extremity.  - erythema much improved on 5/28, no tenderness, has palpable cord over the antecubital fossa consistent with imaging finding of SVT  - continue supportive measures-elevation, warm compresses.      Left hand pain  Reports sudden pain while holding something a few weeks back.  Patient has been receiving diuresis with intravenous Lasix, significant improvement in swelling, erythema.  Continues to complain of pain.    Afebrile, no leukocytosis CRP 4.74, .  X-ray left hand / thumb (5/25)-Negative for fracture or dislocation. Moderate soft tissue swelling in the thumb. Moderate of osteoarthritis at the base of the thumb and the in CMC and STT joints, and throughout the IP joints with joint space narrowing and spurring.   Monitor closely.      Cocaine use.  Patient initially denied drug use, group home staff reported concern for cocaine use.  Urine drug screen positive for cocaine.  Patient reports recent use -with her boyfriend.  Emphasized need to consider abstinence, will need chemical dependency evaluation once closer to discharge.      Acute on chronic anxiety.  Bipolar affective disorder  Continue PTA lamictal 100mg daily  Continue PTA xanax PRN at reduce dose (does not take when she is prescribed oxycodone which she currently is for leg pain)  As needed Atarax 4 times a day     Chronic leg and back pain   Follows with [facility] pain clinic, chronically on oxycodone.  Continue PTA oxycodone 5mg q8prn, minimize narcotic use as able to.     History of hypothyroidism.  PTA not on  "meds.  TSH within normal limits     Medically complicated obesity s/p gastric bypass  Body mass index is 55.85 kg/m . Recommend outpatient follow up/ongoing weight loss.      MEREDITH  Does not use CPAP.  Consider sleep studies as outpatient.     Chronic doxycycline use  No clear indication on chart review, reports chronically used.  Continue for now      Physical deconditioning from medical illness.  PT, OT evaluation once above cardiac issue stablizes  Fall precautions.  Encourage ambulation out of bed as able to.     Nicotine use.  Reports intermittent vaping.  Emphasized need to consider abstinence, nicotine patch ordered          Diet: Fluid restriction 2000 ML FLUID  Regular Diet Adult    DVT Prophylaxis: Pneumatic Compression Devices  Stafford Catheter: Not present  Lines: None     Cardiac Monitoring: ACTIVE order. Indication: Tachyarrhythmias, acute (48 hours)  Code Status: Full Code      Clinically Significant Risk Factors          # Hypochloremia: Lowest Cl = 95 mmol/L in last 2 days, will monitor as appropriate       # Coagulation Defect: INR = 1.18 (Ref range: 0.85 - 1.15) and/or PTT = N/A, will monitor for bleeding     # Chronic heart failure with reduced ejection fraction: last echo with EF <40%           # Morbid Obesity: Estimated body mass index is 51.49 kg/m  as calculated from the following:    Height as of this encounter: 1.626 m (5' 4\").    Weight as of this encounter: 136.1 kg (300 lb).      # Financial/Environmental Concerns: none         Social Drivers of Health    Tobacco Use: High Risk (5/29/2025)    Patient History     Smoking Tobacco Use: Every Day     Smokeless Tobacco Use: Never          Disposition Plan     Medically Ready for Discharge: Anticipated in 2-4 Days       Dejon Kim MD  Hospitalist Service  [facility]  Securely message with [employer] (more info)  Text page via [employer] Paging/Directory   _\"This dictation was performed with voice recognition software and may " "contain errors,  omissions and inadvertent word substitution.\"    _____________________________________________________________________    Interval History   My first interaction with the patient.  Had EGD yesterday and tolerated well.  Denies any episode of bleeding  Denies any chest pain/palpitation  Again have heart rate in 130s    Physical Exam   /78   Pulse 107   Temp 97.7  F (36.5  C) (Oral)   Resp 18   Ht 1.626 m (5' 4\")   Wt 136.1 kg (300 lb)   SpO2 97%   BMI 51.49 kg/m    Gen- pleasant   Neck- supple  CVS- I+II+ no m/r/g, tachycardia   RS- CTAB   Abdo- soft, no tenderness . No g/r/r   Ext- edema     Medical Decision Making       52 MINUTES SPENT BY ME on the date of service doing chart review, history, exam, documentation & further activities per the note.      Data   ------------------------- PAST 24 HR DATA REVIEWED -----------------------------------------------    I have personally reviewed the following data over the past 24 hrs:    N/A  \   8.5 (L)   / N/A     137 96 (L) 22.5 /  109 (H)   4.8 30 (H) 0.97 (H) \       Imaging results reviewed over the past 24 hrs:   No results found for this or any previous visit (from the past 24 hours).  "

## 2025-05-31 ENCOUNTER — ORDERS ONLY (AUTO-RELEASED) (OUTPATIENT)
Dept: CARDIOVASCULAR ICU | Facility: CLINIC | Age: 60
End: 2025-05-31

## 2025-05-31 DIAGNOSIS — I48.92 ATRIAL FLUTTER WITH RAPID VENTRICULAR RESPONSE (H): ICD-10-CM

## 2025-05-31 LAB
HGB BLD-MCNC: 9.4 G/DL (ref 11.7–15.7)
HOLD SPECIMEN: NORMAL
MAGNESIUM SERPL-MCNC: 2.3 MG/DL (ref 1.7–2.3)
MCV RBC AUTO: 76 FL (ref 78–100)
POTASSIUM SERPL-SCNC: 4.8 MMOL/L (ref 3.4–5.3)

## 2025-05-31 PROCEDURE — 250N000013 HC RX MED GY IP 250 OP 250 PS 637

## 2025-05-31 PROCEDURE — 36415 COLL VENOUS BLD VENIPUNCTURE: CPT | Performed by: HOSPITALIST

## 2025-05-31 PROCEDURE — 84132 ASSAY OF SERUM POTASSIUM: CPT | Performed by: HOSPITALIST

## 2025-05-31 PROCEDURE — 250N000013 HC RX MED GY IP 250 OP 250 PS 637: Performed by: INTERNAL MEDICINE

## 2025-05-31 PROCEDURE — 85018 HEMOGLOBIN: CPT | Performed by: INTERNAL MEDICINE

## 2025-05-31 PROCEDURE — 210N000001 HC R&B IMCU HEART CARE

## 2025-05-31 PROCEDURE — 99233 SBSQ HOSP IP/OBS HIGH 50: CPT | Performed by: INTERNAL MEDICINE

## 2025-05-31 PROCEDURE — 250N000011 HC RX IP 250 OP 636: Performed by: INTERNAL MEDICINE

## 2025-05-31 PROCEDURE — 36415 COLL VENOUS BLD VENIPUNCTURE: CPT | Performed by: INTERNAL MEDICINE

## 2025-05-31 PROCEDURE — 83735 ASSAY OF MAGNESIUM: CPT | Performed by: HOSPITALIST

## 2025-05-31 RX ORDER — DIGOXIN 0.25 MG/ML
500 INJECTION INTRAMUSCULAR; INTRAVENOUS ONCE
Status: COMPLETED | OUTPATIENT
Start: 2025-05-31 | End: 2025-05-31

## 2025-05-31 RX ADMIN — Medication 5 MG: at 00:30

## 2025-05-31 RX ADMIN — METOPROLOL SUCCINATE 25 MG: 25 TABLET, EXTENDED RELEASE ORAL at 09:13

## 2025-05-31 RX ADMIN — DOXYCYCLINE HYCLATE 100 MG: 100 CAPSULE ORAL at 19:53

## 2025-05-31 RX ADMIN — CYANOCOBALAMIN TAB 1000 MCG 1000 MCG: 1000 TAB at 09:12

## 2025-05-31 RX ADMIN — TORSEMIDE 20 MG: 20 TABLET ORAL at 09:13

## 2025-05-31 RX ADMIN — PANTOPRAZOLE SODIUM 40 MG: 40 TABLET, DELAYED RELEASE ORAL at 17:26

## 2025-05-31 RX ADMIN — AMIODARONE HYDROCHLORIDE 400 MG: 200 TABLET ORAL at 09:13

## 2025-05-31 RX ADMIN — OXYCODONE HYDROCHLORIDE 5 MG: 5 TABLET ORAL at 12:03

## 2025-05-31 RX ADMIN — ACETAMINOPHEN 975 MG: 325 TABLET, FILM COATED ORAL at 00:29

## 2025-05-31 RX ADMIN — SENNOSIDES AND DOCUSATE SODIUM 2 TABLET: 50; 8.6 TABLET ORAL at 00:30

## 2025-05-31 RX ADMIN — AMIODARONE HYDROCHLORIDE 400 MG: 200 TABLET ORAL at 19:53

## 2025-05-31 RX ADMIN — DIGOXIN 500 MCG: 0.25 INJECTION INTRAMUSCULAR; INTRAVENOUS at 14:22

## 2025-05-31 RX ADMIN — DOXYCYCLINE HYCLATE 100 MG: 100 CAPSULE ORAL at 09:13

## 2025-05-31 RX ADMIN — ACETAMINOPHEN 975 MG: 325 TABLET, FILM COATED ORAL at 21:08

## 2025-05-31 RX ADMIN — LAMOTRIGINE 100 MG: 100 TABLET ORAL at 09:13

## 2025-05-31 RX ADMIN — METOPROLOL SUCCINATE 25 MG: 25 TABLET, EXTENDED RELEASE ORAL at 19:53

## 2025-05-31 RX ADMIN — PANTOPRAZOLE SODIUM 40 MG: 40 TABLET, DELAYED RELEASE ORAL at 09:13

## 2025-05-31 ASSESSMENT — ACTIVITIES OF DAILY LIVING (ADL)
ADLS_ACUITY_SCORE: 49
ADLS_ACUITY_SCORE: 49
ADLS_ACUITY_SCORE: 46
ADLS_ACUITY_SCORE: 46
ADLS_ACUITY_SCORE: 47
ADLS_ACUITY_SCORE: 49
ADLS_ACUITY_SCORE: 46
ADLS_ACUITY_SCORE: 49
ADLS_ACUITY_SCORE: 47
ADLS_ACUITY_SCORE: 46
ADLS_ACUITY_SCORE: 49
ADLS_ACUITY_SCORE: 47
ADLS_ACUITY_SCORE: 46
ADLS_ACUITY_SCORE: 47
ADLS_ACUITY_SCORE: 46
ADLS_ACUITY_SCORE: 46
ADLS_ACUITY_SCORE: 47
ADLS_ACUITY_SCORE: 49
ADLS_ACUITY_SCORE: 49
ADLS_ACUITY_SCORE: 46

## 2025-05-31 NOTE — PLAN OF CARE
[id number]. Pt here since 5/22 with afib/flutter RVR. A&O x4. VSS. Tele afib/flutter, rate 130s bpm before evening medication, afterwards in the low 100s bpm. Tolerating 2g, low sat fat diet2. Up with ax1 gb/w. Oxycodone x1 given for chronic leg pain. Pt scoring green on the Aggression Stop Light Tool. Plan to discharge to group home with good heart rate control and then outpt DCCV.

## 2025-05-31 NOTE — PROGRESS NOTES
[facility]    Medicine Progress Note - Hospitalist Service    Date of Admission:  5/22/2025    Assessment & Plan   Omayra Malone is a 60 year old female with history of hypertension, hepatitis C, liver cirrhosis, chronic anemia, obesity status post gastric bypass, bipolar affective disorder, MEREDITH, hypothyroidism, osteoarthritis admitted on 5/22/2025 for shortness of breath, palpitations.     Acute heart failure with reduced EF of 35%, decompensated  Atrial flutter with RVR-status post DCCV on 5/22, flipped to normal sinus rhythm, now in A flutter  Acute hypoxic respiratory failure multifactorial from heart failure exacerbation, atrial flutter, Anemia, obstructive sleep apnea, OHS, deconditioning.  Moderate tricuspid regurgitation.  Mild pulmonary hypertension.  Mild to moderate MR.  Hypertension.  *Patient resident of [facility], presented with shortness of breath and palpitations.  Patient initially denied drug use,  urine positive for cocaine.  Reports recent use.  *On EMS arrival the patient was found to be in SVT at a pulse of 176. En route to the ED they attempted 6mg Adenosine, followed shortly after by another 12 mg with no success. -SpO2 was 94% en route to the ED. in ED heart rate in 170s, blood pressure in 120s systolic.  Sodium 141, potassium 3.9, creatinine 0.62.  *WBC 9.6, TSH 2.06.  N-terminal proBNP 1648.  Troponin high-sensitivity 12 >1  *Chest x-ray no definitive evidence for pulmonary edema, no concerns for infection.  *EKG sinus tachycardia, left axis deviation, ST-T wave changes.  *Echocardiogram with LVEF of 35%, moderate global hypokinesis of the left ventricle.  Right ventricle  mildly dilated.  Moderate mitral regurgitation, moderate tricuspid regurgitation.  Mild pulmonary hypertension.  *5/23 - was on intravenous heparin drip, initiated IV Lasix drip for diuresis.  *5/24 --in A-fib/flutter with rates in 140s to 150s.  Initiated intravenous amiodarone drip.    *5/27-  had chest pain, trop was negative. Seems reproducible. CT of the chest ordered by cardiology was negative for aortic dissection, no acute findings     - 5/29- rate controlled with HR <100 and is on Diltiazem drip which was initiated on 5/27 by EP and transition to p.o. metoprolol tartrate.  Changed to Toprol-XL 25 mg twice daily on 05/30.  - Amio drip transition to PO amiodarone 400mg BID on 5/26/2025 . Plan to  -continue amiodarone at 400 mg BID x 1 more week (6/6) then decrease to 200 mg BID     -Had been on intravenous heparin drip that is discontinued 05/30  - Lasix drip-->> Torsemide 20mg BID on 5/26/2025, BUN/Cr slightly going up on 5/27, reduced to Torsemide 20mg daily   - Hold prior to admission amlodipine, hydrochlorothiazide to allow room for titration of rate controlling agents    5/30: Continued tachycardia in 130s.  Requested EP to re-review   5/31: Discussed with cardiology, as BP is soft ? candidate for adding digoxin    Hypokalemia, hypomagnesemia.  Likely from diuresis.  Keep potassium around 4, magnesium around 2, replacement protocols ordered     Acute on chronic anemia -likely dilutional, rule out blood loss.  Microcytic anemia, severe iron deficiency.  Vitamin B12 deficiency.  Hemoglobin 8.0 from previous 11.8 1-year ago, patient denies bleeding or melena. Does have history of gastric bypass does not appear to be on B12.    *Iron saturation index 4, vitamin B12 150.  CK29.  Folate 16.7.  UA no hematuria.  *IV Venofer for 2 doses -5/23, 5/24.   - started on oral iron, vitamin B12 supplements, continue  - change IV PPI to PO PPI  - Consent for blood transfusion obtained, transfuse for hemoglobin less than 8 or symptomatic.    - [facility] following, status post EGD 05/29: Stenotic, friable GJ anastomosis without active bleeding.  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    pantoprazole as above.     LUE superficial vein thrombosis  *had prior IV access there and also recent extravasation of IV Protonix.  *House MEGGAN evaluated on 5/26 evening, refer to note for detail. Noted some erythema and edema  *LUE US (5/27)1.  Positive for cephalic vein thrombus at the distal humerus and antecubital fossa spanning greater than 5 cm.  2.  No deep venous thrombosis in the left upper extremity.  - erythema much improved on 5/28, no tenderness, has palpable cord over the antecubital fossa consistent with imaging finding of SVT  - continue supportive measures-elevation, warm compresses.      Left hand pain  Reports sudden pain while holding something a few weeks back.  Patient has been receiving diuresis with intravenous Lasix, significant improvement in swelling, erythema.  Continues to complain of pain.    Afebrile, no leukocytosis CRP 4.74, .  X-ray left hand / thumb (5/25)-Negative for fracture or dislocation. Moderate soft tissue swelling in the thumb. Moderate of osteoarthritis at the base of the thumb and the in CMC and STT joints, and throughout the IP joints with joint space narrowing and spurring.   Monitor closely.      Cocaine use.  Patient initially denied drug use, group home staff reported concern for cocaine use.  Urine drug screen positive for cocaine.  Patient reports recent use -with her boyfriend.  Emphasized need to consider abstinence, will need chemical dependency evaluation once closer to discharge.      Acute on chronic anxiety.  Bipolar affective disorder  Continue PTA lamictal 100mg daily  Continue PTA xanax PRN at reduce dose (does not take when she is prescribed oxycodone which she currently is for leg pain)  As needed Atarax 4 times a day     Chronic leg and back pain   Follows with [facility] pain clinic, chronically on oxycodone.  Continue PTA oxycodone 5mg  "q8prn, minimize narcotic use as able to.     History of hypothyroidism.  PTA not on meds.  TSH within normal limits     Medically complicated obesity s/p gastric bypass  Body mass index is 55.85 kg/m . Recommend outpatient follow up/ongoing weight loss.      MEREDITH  Does not use CPAP.  Consider sleep studies as outpatient.     Chronic doxycycline use  No clear indication on chart review, reports chronically used.  Continue for now      Physical deconditioning from medical illness.  PT, OT evaluation once above cardiac issue stablizes  Fall precautions.  Encourage ambulation out of bed as able to.     Nicotine use.  Reports intermittent vaping.  Emphasized need to consider abstinence, nicotine patch ordered          Diet: Fluid restriction 2000 ML FLUID  Regular Diet Adult    DVT Prophylaxis: Pneumatic Compression Devices  Stafford Catheter: Not present  Lines: None     Cardiac Monitoring: ACTIVE order. Indication: Tachyarrhythmias, acute (48 hours)  Code Status: Full Code      Clinically Significant Risk Factors          # Hypochloremia: Lowest Cl = 96 mmol/L in last 2 days, will monitor as appropriate             # Chronic heart failure with reduced ejection fraction: last echo with EF <40%           # Morbid Obesity: Estimated body mass index is 49.04 kg/m  as calculated from the following:    Height as of this encounter: 1.626 m (5' 4\").    Weight as of this encounter: 129.6 kg (285 lb 11.2 oz).        # Financial/Environmental Concerns: none         Social Drivers of Health    Tobacco Use: High Risk (5/29/2025)    Patient History     Smoking Tobacco Use: Every Day     Smokeless Tobacco Use: Never          Disposition Plan     Medically Ready for Discharge: Anticipated Tomorrow if heart rate remains stable medical therapy will be physical therapy       Dejon Kim MD  Hospitalist Service  [facility]  Securely message with [employer] (more info)  Text page via [employer] Paging/Directory   _\"This " "dictation was performed with voice recognition software and may contain errors,  omissions and inadvertent word substitution.\"    _____________________________________________________________________    Interval History   Doing okay but heart rate continues to be high intermittently in 130-140 range.  Denies any chest pain/palpitations  Physical Exam   /78 (BP Location: Left arm)   Pulse 88   Temp 97.9  F (36.6  C) (Oral)   Resp 18   Ht 1.626 m (5' 4\")   Wt 129.6 kg (285 lb 11.2 oz)   SpO2 96%   BMI 49.04 kg/m    Gen- pleasant   Neck- supple  CVS- I+II+ no m/r/g, tachycardia   RS- CTAB   Abdo- soft, no tenderness . No g/r/r   Ext- edema     Medical Decision Making       52 MINUTES SPENT BY ME on the date of service doing chart review, history, exam, documentation & further activities per the note.  Discussion patient, bedside RN, cardiology service    Data   ------------------------- PAST 24 HR DATA REVIEWED -----------------------------------------------    I have personally reviewed the following data over the past 24 hrs:    N/A  \   9.4 (L)   / N/A     N/A N/A N/A /  N/A   4.8 N/A N/A \       Imaging results reviewed over the past 24 hrs:   No results found for this or any previous visit (from the past 24 hours).  "

## 2025-05-31 NOTE — PLAN OF CARE
Neuro: Alert and oriented x 4  Vital signs: 2 L NC, other VSS  Respiratory: LS Diminished  Pain:Tylenol given for chronic pain  Tele:Afib CVR,RVR non sustaining  Mobility:Assist of 1 with walker   Drips/Drains/IVF:None  Skin:Scattered bruising  Diet:Regular, 2 L fluid restrictions  GI/:Voiding spontaneous,tea color urine,  senna given overnight  Aggression color:green  Plan/Test/Consult:  Labs:  Misc:  Bed alarm on, call light within reach

## 2025-05-31 NOTE — PLAN OF CARE
A&O x 4. VSS, ex tachycardic at times. RA. Oxy x 1 for BLE pain w/relief. Assist x 1 w/GB/W. Toprol XL started today, remains tachy in 130's intermittently. Good UOP. Hgb of 8.5. Will continue w/plan of care.

## 2025-05-31 NOTE — PLAN OF CARE
2000 mL fluid restriction. A&O x 4. VSS, ex tachycardic intermittently. RA. Tele: Afib/flutter CVR, intermittently RVR, non-sustaining. Assist x 1 w/GB/W. Oxy x 1 for chronic BLE pain. 1 x dose digoxin IV given for rate control. Hgb of 9.4. Will continue w/plan of care.
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
## 2025-06-01 ENCOUNTER — APPOINTMENT (OUTPATIENT)
Dept: PHYSICAL THERAPY | Facility: CLINIC | Age: 60
DRG: 291 | End: 2025-06-01
Attending: INTERNAL MEDICINE
Payer: COMMERCIAL

## 2025-06-01 LAB
ANION GAP SERPL CALCULATED.3IONS-SCNC: 14 MMOL/L (ref 7–15)
ATRIAL RATE - MUSE: 300 BPM
BUN SERPL-MCNC: 32.1 MG/DL (ref 8–23)
CALCIUM SERPL-MCNC: 9.7 MG/DL (ref 8.8–10.4)
CHLORIDE SERPL-SCNC: 99 MMOL/L (ref 98–107)
CREAT SERPL-MCNC: 0.99 MG/DL (ref 0.51–0.95)
DIASTOLIC BLOOD PRESSURE - MUSE: NORMAL MMHG
EGFRCR SERPLBLD CKD-EPI 2021: 65 ML/MIN/1.73M2
GLUCOSE SERPL-MCNC: 99 MG/DL (ref 70–99)
HCO3 SERPL-SCNC: 26 MMOL/L (ref 22–29)
INTERPRETATION ECG - MUSE: NORMAL
MAGNESIUM SERPL-MCNC: 2.2 MG/DL (ref 1.7–2.3)
P AXIS - MUSE: NORMAL DEGREES
POTASSIUM SERPL-SCNC: 4.6 MMOL/L (ref 3.4–5.3)
POTASSIUM SERPL-SCNC: 4.6 MMOL/L (ref 3.4–5.3)
PR INTERVAL - MUSE: NORMAL MS
QRS DURATION - MUSE: 96 MS
QT - MUSE: 406 MS
QTC - MUSE: 468 MS
R AXIS - MUSE: -43 DEGREES
SODIUM SERPL-SCNC: 139 MMOL/L (ref 135–145)
SYSTOLIC BLOOD PRESSURE - MUSE: NORMAL MMHG
T AXIS - MUSE: 59 DEGREES
VENTRICULAR RATE- MUSE: 80 BPM

## 2025-06-01 PROCEDURE — 36415 COLL VENOUS BLD VENIPUNCTURE: CPT | Performed by: INTERNAL MEDICINE

## 2025-06-01 PROCEDURE — 250N000011 HC RX IP 250 OP 636: Performed by: INTERNAL MEDICINE

## 2025-06-01 PROCEDURE — 250N000013 HC RX MED GY IP 250 OP 250 PS 637

## 2025-06-01 PROCEDURE — 210N000001 HC R&B IMCU HEART CARE

## 2025-06-01 PROCEDURE — 97530 THERAPEUTIC ACTIVITIES: CPT | Mod: GP

## 2025-06-01 PROCEDURE — 82310 ASSAY OF CALCIUM: CPT | Performed by: INTERNAL MEDICINE

## 2025-06-01 PROCEDURE — 80048 BASIC METABOLIC PNL TOTAL CA: CPT | Performed by: INTERNAL MEDICINE

## 2025-06-01 PROCEDURE — 250N000013 HC RX MED GY IP 250 OP 250 PS 637: Performed by: HOSPITALIST

## 2025-06-01 PROCEDURE — 250N000013 HC RX MED GY IP 250 OP 250 PS 637: Performed by: INTERNAL MEDICINE

## 2025-06-01 PROCEDURE — 99233 SBSQ HOSP IP/OBS HIGH 50: CPT | Performed by: INTERNAL MEDICINE

## 2025-06-01 PROCEDURE — 97161 PT EVAL LOW COMPLEX 20 MIN: CPT | Mod: GP

## 2025-06-01 PROCEDURE — 83735 ASSAY OF MAGNESIUM: CPT | Performed by: INTERNAL MEDICINE

## 2025-06-01 PROCEDURE — 97116 GAIT TRAINING THERAPY: CPT | Mod: GP

## 2025-06-01 RX ORDER — DIGOXIN 0.25 MG/ML
250 INJECTION INTRAMUSCULAR; INTRAVENOUS ONCE
Status: COMPLETED | OUTPATIENT
Start: 2025-06-01 | End: 2025-06-01

## 2025-06-01 RX ORDER — DIGOXIN 0.25 MG/ML
250 INJECTION INTRAMUSCULAR; INTRAVENOUS ONCE
Status: COMPLETED | OUTPATIENT
Start: 2025-06-01 | End: 2025-06-02

## 2025-06-01 RX ADMIN — OXYCODONE HYDROCHLORIDE 5 MG: 5 TABLET ORAL at 13:08

## 2025-06-01 RX ADMIN — OXYCODONE HYDROCHLORIDE 5 MG: 5 TABLET ORAL at 21:30

## 2025-06-01 RX ADMIN — ALPRAZOLAM 0.25 MG: 0.25 TABLET ORAL at 23:43

## 2025-06-01 RX ADMIN — SENNOSIDES AND DOCUSATE SODIUM 2 TABLET: 50; 8.6 TABLET ORAL at 22:00

## 2025-06-01 RX ADMIN — PANTOPRAZOLE SODIUM 40 MG: 40 TABLET, DELAYED RELEASE ORAL at 16:19

## 2025-06-01 RX ADMIN — SENNOSIDES AND DOCUSATE SODIUM 2 TABLET: 50; 8.6 TABLET ORAL at 13:02

## 2025-06-01 RX ADMIN — AMIODARONE HYDROCHLORIDE 400 MG: 200 TABLET ORAL at 21:36

## 2025-06-01 RX ADMIN — DOXYCYCLINE HYCLATE 100 MG: 100 CAPSULE ORAL at 21:30

## 2025-06-01 RX ADMIN — AMIODARONE HYDROCHLORIDE 400 MG: 200 TABLET ORAL at 09:24

## 2025-06-01 RX ADMIN — CYANOCOBALAMIN TAB 1000 MCG 1000 MCG: 1000 TAB at 09:24

## 2025-06-01 RX ADMIN — METOPROLOL SUCCINATE 25 MG: 25 TABLET, EXTENDED RELEASE ORAL at 21:37

## 2025-06-01 RX ADMIN — FERROUS SULFATE TAB 325 MG (65 MG ELEMENTAL FE) 325 MG: 325 (65 FE) TAB at 09:24

## 2025-06-01 RX ADMIN — Medication 5 MG: at 21:37

## 2025-06-01 RX ADMIN — DOXYCYCLINE HYCLATE 100 MG: 100 CAPSULE ORAL at 09:24

## 2025-06-01 RX ADMIN — METOPROLOL SUCCINATE 25 MG: 25 TABLET, EXTENDED RELEASE ORAL at 09:24

## 2025-06-01 RX ADMIN — TORSEMIDE 20 MG: 20 TABLET ORAL at 09:24

## 2025-06-01 RX ADMIN — DIGOXIN 250 MCG: 0.25 INJECTION INTRAMUSCULAR; INTRAVENOUS at 14:14

## 2025-06-01 RX ADMIN — LAMOTRIGINE 100 MG: 100 TABLET ORAL at 09:24

## 2025-06-01 RX ADMIN — PANTOPRAZOLE SODIUM 40 MG: 40 TABLET, DELAYED RELEASE ORAL at 07:14

## 2025-06-01 ASSESSMENT — ACTIVITIES OF DAILY LIVING (ADL)
ADLS_ACUITY_SCORE: 46
ADLS_ACUITY_SCORE: 49
ADLS_ACUITY_SCORE: 46
ADLS_ACUITY_SCORE: 49
ADLS_ACUITY_SCORE: 46
ADLS_ACUITY_SCORE: 49
ADLS_ACUITY_SCORE: 46
ADLS_ACUITY_SCORE: 49
ADLS_ACUITY_SCORE: 46

## 2025-06-01 NOTE — PROGRESS NOTES
[facility]    Medicine Progress Note - Hospitalist Service    Date of Admission:  5/22/2025    Assessment & Plan   Omayra Malone is a 60 year old female with history of hypertension, hepatitis C, liver cirrhosis, chronic anemia, obesity status post gastric bypass, bipolar affective disorder, MEREDITH, hypothyroidism, osteoarthritis admitted on 5/22/2025 for shortness of breath, palpitations.     Acute heart failure with reduced EF of 35%, decompensated  Atrial flutter with RVR-status post DCCV on 5/22, flipped to normal sinus rhythm, now in A flutter  Acute hypoxic respiratory failure multifactorial from heart failure exacerbation, atrial flutter, Anemia, obstructive sleep apnea, OHS, deconditioning.  Moderate tricuspid regurgitation.  Mild pulmonary hypertension.  Mild to moderate MR.  Hypertension.  *Patient resident of [facility], presented with shortness of breath and palpitations.  Patient initially denied drug use,  urine positive for cocaine.  Reports recent use.  *On EMS arrival the patient was found to be in SVT at a pulse of 176. En route to the ED they attempted 6mg Adenosine, followed shortly after by another 12 mg with no success. -SpO2 was 94% en route to the ED. in ED heart rate in 170s, blood pressure in 120s systolic.  Sodium 141, potassium 3.9, creatinine 0.62.  *WBC 9.6, TSH 2.06.  N-terminal proBNP 1648.  Troponin high-sensitivity 12 >1  *Chest x-ray no definitive evidence for pulmonary edema, no concerns for infection.  *EKG sinus tachycardia, left axis deviation, ST-T wave changes.  *Echocardiogram with LVEF of 35%, moderate global hypokinesis of the left ventricle.  Right ventricle  mildly dilated.  Moderate mitral regurgitation, moderate tricuspid regurgitation.  Mild pulmonary hypertension.  *5/23 - was on intravenous heparin drip, initiated IV Lasix drip for diuresis.  *5/24 --in A-fib/flutter with rates in 140s to 150s.  Initiated intravenous amiodarone drip.    *5/27-  had chest pain, trop was negative. Seems reproducible. CT of the chest ordered by cardiology was negative for aortic dissection, no acute findings     - 5/29- rate controlled with HR <100 and is on Diltiazem drip which was initiated on 5/27 by EP and transition to p.o. metoprolol tartrate.  Changed to Toprol-XL 25 mg twice daily on 05/30.  - Amio drip transition to PO amiodarone 400mg BID on 5/26/2025 . Plan to  -continue amiodarone at 400 mg BID x 1 more week (6/6) then decrease to 200 mg BID     -Had been on intravenous heparin drip that is discontinued 05/30  - Lasix drip-->> Torsemide 20mg BID on 5/26/2025, BUN/Cr slightly going up on 5/27, reduced to Torsemide 20mg daily   - Hold prior to admission amlodipine, hydrochlorothiazide to allow room for titration of rate controlling agents    5/30: Continued tachycardia in 130s.  Requested EP to re-review   5/31: Had a loading dose of digoxin  06/01: Await cardiology review for query continuation of low-dose digoxin  Need PT evaluation for tentative discharge to patient's group home    Hypokalemia, hypomagnesemia.  Likely from diuresis.  Keep potassium around 4, magnesium around 2, replacement protocols ordered     Acute on chronic anemia -likely dilutional, rule out blood loss.  Microcytic anemia, severe iron deficiency.  Vitamin B12 deficiency.  Hemoglobin 8.0 from previous 11.8 1-year ago, patient denies bleeding or melena. Does have history of gastric bypass does not appear to be on B12.    *Iron saturation index 4, vitamin B12 150.  CK29.  Folate 16.7.  UA no hematuria.  *IV Venofer for 2 doses -5/23, 5/24.   - started on oral iron, vitamin B12 supplements, continue  - change IV PPI to PO PPI  - Consent for blood transfusion obtained, transfuse for hemoglobin less than 8 or symptomatic.    - [facility] following, status post EGD 05/29: Stenotic, friable GJ anastomosis without active bleeding.  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    pantoprazole as above.     LUE superficial vein thrombosis  *had prior IV access there and also recent extravasation of IV Protonix.  *House MEGGAN evaluated on 5/26 evening, refer to note for detail. Noted some erythema and edema  *LUE US (5/27)1.  Positive for cephalic vein thrombus at the distal humerus and antecubital fossa spanning greater than 5 cm.  2.  No deep venous thrombosis in the left upper extremity.  - erythema much improved on 5/28, no tenderness, has palpable cord over the antecubital fossa consistent with imaging finding of SVT  - continue supportive measures-elevation, warm compresses.      Left hand pain  Reports sudden pain while holding something a few weeks back.  Patient has been receiving diuresis with intravenous Lasix, significant improvement in swelling, erythema.  Continues to complain of pain.    Afebrile, no leukocytosis CRP 4.74, .  X-ray left hand / thumb (5/25)-Negative for fracture or dislocation. Moderate soft tissue swelling in the thumb. Moderate of osteoarthritis at the base of the thumb and the in CMC and STT joints, and throughout the IP joints with joint space narrowing and spurring.   Monitor closely.      Cocaine use.  Patient initially denied drug use, group home staff reported concern for cocaine use.  Urine drug screen positive for cocaine.  Patient reports recent use -with her boyfriend.  Emphasized need to consider abstinence, will need chemical dependency evaluation once closer to discharge.      Acute on chronic anxiety.  Bipolar affective disorder  Continue PTA lamictal 100mg daily  Continue PTA xanax PRN at reduce dose (does not take when she is prescribed oxycodone which she currently is for leg pain)  As needed Atarax 4 times a day     Chronic leg and back pain  "  Follows with [facility] pain clinic, chronically on oxycodone.  Continue PTA oxycodone 5mg q8prn, minimize narcotic use as able to.     History of hypothyroidism.  PTA not on meds.  TSH within normal limits     Medically complicated obesity s/p gastric bypass  Body mass index is 55.85 kg/m . Recommend outpatient follow up/ongoing weight loss.      MEREDITH  Does not use CPAP.  Consider sleep studies as outpatient.     Chronic doxycycline use  No clear indication on chart review, reports chronically used.  Continue for now      Physical deconditioning from medical illness.  PT, OT evaluation once above cardiac issue stablizes  Fall precautions.  Encourage ambulation out of bed as able to.     Nicotine use.  Reports intermittent vaping.  Emphasized need to consider abstinence, nicotine patch ordered          Diet: Fluid restriction 2000 ML FLUID  Regular Diet Adult    DVT Prophylaxis: Pneumatic Compression Devices  Stafford Catheter: Not present  Lines: None     Cardiac Monitoring: ACTIVE order. Indication: Tachyarrhythmias, acute (48 hours)  Code Status: Full Code      Clinically Significant Risk Factors                      # Chronic heart failure with reduced ejection fraction: last echo with EF <40%           # Morbid Obesity: Estimated body mass index is 48.68 kg/m  as calculated from the following:    Height as of this encounter: 1.626 m (5' 4\").    Weight as of this encounter: 128.6 kg (283 lb 9.6 oz).        # Financial/Environmental Concerns: none         Social Drivers of Health    Tobacco Use: High Risk (5/29/2025)    Patient History     Smoking Tobacco Use: Every Day     Smokeless Tobacco Use: Never          Disposition Plan     Medically Ready for Discharge: Anticipated Tomorrow if heart rate remains stable medical therapy will be physical therapy       Dejon Kim MD  Hospitalist Service  [facility]  Securely message with [employer] (more info)  Text page via [facility] Paging/Directory " "  _\"This dictation was performed with voice recognition software and may contain errors,  omissions and inadvertent word substitution.\"    _____________________________________________________________________    Interval History   Doing okay , more sleepy today  Denies any chest pain/palpitations  Heart rate is better than yesterday but still not at goal  Physical Exam   /85 (BP Location: Left arm)   Pulse 100   Temp 97.7  F (36.5  C) (Oral)   Resp 16   Ht 1.626 m (5' 4\")   Wt 128.6 kg (283 lb 9.6 oz)   SpO2 95%   BMI 48.68 kg/m    Gen- pleasant   Neck- supple  CVS- I+II+ no m/r/g, tachycardia   RS- CTAB   Abdo- soft, no tenderness . No g/r/r   Ext- edema     Medical Decision Making       51 MINUTES SPENT BY ME on the date of service doing chart review, history, exam, documentation & further activities per the note.  Discussion patient, bedside RN, cardiology service    Data   ------------------------- PAST 24 HR DATA REVIEWED -----------------------------------------------    I have personally reviewed the following data over the past 24 hrs:    N/A  \   N/A   / N/A     139 99 32.1 (H) /  99   4.6; 4.6 26 0.99 (H) \       Imaging results reviewed over the past 24 hrs:   No results found for this or any previous visit (from the past 24 hours).  "

## 2025-06-01 NOTE — PLAN OF CARE
A&O x 4. VSS, ex intermittently tachycardic. RA. Tele: Aflutter CVR/RVR.  2000mL fluid restriction. Oxy x 1 for chronic leg pain. Ax1 w/walker. Denies CP/SOB. IV digoxin x 1 given for rate control. Plan: One more dose of IV digoxin this evening. EP to see on Monday. Possible discharge tomorrow. Will continue w/plan of care.
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
## 2025-06-01 NOTE — PROGRESS NOTES
"   06/01/25 1985   Appointment Info   Signing Clinician's Name / Credentials (PT) Alexis Weinberg, PT, DPT       Present no   Living Environment   People in Home alone   Current Living Arrangements group home   Home Accessibility wheelchair accessible   Transportation Anticipated agency   Living Environment Comments Pt lives in a group home with other residents.  staff provide rides at baseline. Rampt to enter the home.   Self-Care   Usual Activity Tolerance moderate   Current Activity Tolerance moderate   Regular Exercise No   Equipment Currently Used at Home walker, rolling   Fall history within last six months yes   Number of times patient has fallen within last six months 3   Activity/Exercise/Self-Care Comment Pt reporting that they use a 4WW at baseline for mobility. Per CC note, patient is stand by assist at the group home.  staff assist with higher level IADLs as needed.   General Information   Onset of Illness/Injury or Date of Surgery 05/22/25   Referring Physician Dejon Kim MD   Patient/Family Therapy Goals Statement (PT) rest   Pertinent History of Current Problem (include personal factors and/or comorbidities that impact the POC) Per chart review, \"Omayra Malone is a 60 year old female with history of hypertension, hepatitis C, liver cirrhosis, chronic anemia, obesity status post gastric bypass, bipolar affective disorder, MEREDITH, hypothyroidism, osteoarthritis admitted on 5/22/2025 for shortness of breath, palpitations. Pt found to have Acute heart failure with reduced EF of 35%. Pt also underwent EGD with clipping during hospital course.\"   Existing Precautions/Restrictions no known precautions/restrictions   Cognition   Affect/Mental Status (Cognition) WFL;flat/blunted affect   Orientation Status (Cognition) oriented x 4   Follows Commands (Cognition) WFL   Cognitive Status Comments Pt with short responses at times, but overall cooperative   Integumentary/Edema "   Integumentary/Edema no deficits were identifed   Posture    Posture Forward head position;Protracted shoulders   Range of Motion (ROM)   Range of Motion ROM is WFL   Strength (Manual Muscle Testing)   Strength (Manual Muscle Testing) Deficits observed during functional mobility   Strength Comments mild functional strength deficits   Bed Mobility   Comment, (Bed Mobility) IND supine<>sit   Transfers   Comment, (Transfers) CGA sit>stand w/ FWW   Gait/Stairs (Locomotion)   Distance in Feet (Gait) 5' eval   Comment, (Gait/Stairs) pt ambulated 5' w/ FWW for eval w/ CGA   Balance   Balance Comments Impaired dynamic balance   Sensory Examination   Sensory Perception patient reports no sensory changes   Clinical Impression   Criteria for Skilled Therapeutic Intervention Yes, treatment indicated   PT Diagnosis (PT) Impaired functional mobility   Influenced by the following impairments impaired strength, balance, activity tolerance   Functional limitations due to impairments limited IND with mobility   Clinical Presentation (PT Evaluation Complexity) evolving   Clinical Presentation Rationale clinical judgement, continued high heart rate   Clinical Decision Making (Complexity) low complexity   Planned Therapy Interventions (PT) balance training;bed mobility training;gait training;home exercise program;neuromuscular re-education;patient/family education;ROM (range of motion);strengthening;transfer training;progressive activity/exercise;risk factor education;home program guidelines   Risk & Benefits of therapy have been explained evaluation/treatment results reviewed;care plan/treatment goals reviewed;risks/benefits reviewed;current/potential barriers reviewed;participants voiced agreement with care plan;participants included;patient   PT Total Evaluation Time   PT Eval, Low Complexity Minutes (20932) 5   Physical Therapy Goals   PT Frequency 3x/week   PT Predicted Duration/Target Date for Goal Attainment 06/08/25   PT Goals  Transfers;Gait   PT: Transfers Modified independent;Sit to/from stand;Bed to/from chair;Assistive device   PT: Gait Modified independent;Assistive device;100 feet   Interventions   Interventions Quick Adds Gait Training;Therapeutic Activity   Therapeutic Activity   Therapeutic Activities: dynamic activities to improve functional performance Minutes (53492) 10   Symptoms Noted During/After Treatment Fatigue   Treatment Detail/Skilled Intervention Greeted pt upon arrival to room. Pt initially declining out of bed mobility, but eventually agreeable citing need to use the restroom. Pt tachycardic with supine HR of 120 prior to mobility. HR increased to 145 with activity, but stable and asymptomatic throughout. Sit>stand w/ FWW and CGA. Cueing for safe and efficient sit<>stand procedure including scooting to the front edge of the chair, to lean forward with nose over toes, and hand and foot placement. Toilet transfer performed w/ SBA and good use of grab bars for safety. Additional sit<>stand w/ 4WW and SBA. Attempted cueing for safe use of brakes on 4WW, but pt declining, stating they do not do it that way at home. Stand>sit to bed w/ 4WW and SBA. Pt left with all needs met and call light within reach. RN updated.   Gait Training   Gait Training Minutes (83111) 8   Symptoms Noted During/After Treatment (Gait Training) fatigue;shortness of breath   Treatment Detail/Skilled Intervention Pt ambulated 50' w/ 4WW and CGA, progressing to mostly SBA throughout. Noting pt demonstrating forward head  posture with head and eyes facing downward and stooped forward posture. Cueing to stand tall with head and eyes up. Improved upright posture following cueing. Suggested taking seated rest break on seat of 4WW, pt declining, opting to return to bed. Declining further ambulation.   Distance in Feet 50'   PT Discharge Planning   PT Plan independence with transfers and increase gait distance w/ 4WW, sit<>stands, general strengthening    PT Discharge Recommendation (DC Rec) home with assist;home with home care physical therapy   PT Rationale for DC Rec Patient appears near baseline mobility levels with mild deficits in strength, balance, and activity tolerance. Pt is ambulating household distances w/ 4WW and SBA at this time. Recommend discharge back to  with continued SBA from group home staff and use of 4WW. Recommend discharge with HHPT to continue increasing safety and independence with functional mobility to return to OF.   PT Brief overview of current status SBA w/ 4WW: Goals of therapy will be to address safe mobility and make recs for d/c to next level of care. Pt and RN will continue to follow all falls risk precautions as documented by RN staff while hospitalized   PT Total Distance Amb During Session (feet) 50   Physical Therapy Time and Intention   Timed Code Treatment Minutes 18   Total Session Time (sum of timed and untimed services) 23

## 2025-06-01 NOTE — PLAN OF CARE
Goal Outcome Evaluation:  -~Care plan-end of shift note: 2020-0730   -~Orientation/Mentation:A&O x4   -~VS: VSS on RA  -~LS/Pulm:Ls diminished   -~Tele/Cardiac:Aflutter CVR  -~GI:WDL ex obese  -~:WDL  -~Pain:managed with tylenol  -~Mobility:up w/SBA  -~Skin:scattered bruises, red blanchable sacrum/coccyx  -~Diet:regular, 2000 ml FR   -~Safety/Concern:fall risk   -~Aggression color:green  -~Plan/Shift summary/Goals:denies SOB/CP. Plans for PT consult , out patient [facility] and EP to see pt tomorrow-Monday.

## 2025-06-02 ENCOUNTER — APPOINTMENT (OUTPATIENT)
Dept: ULTRASOUND IMAGING | Facility: CLINIC | Age: 60
DRG: 291 | End: 2025-06-02
Attending: INTERNAL MEDICINE
Payer: COMMERCIAL

## 2025-06-02 VITALS
HEART RATE: 100 BPM | TEMPERATURE: 98.6 F | BODY MASS INDEX: 48.01 KG/M2 | HEIGHT: 64 IN | SYSTOLIC BLOOD PRESSURE: 137 MMHG | DIASTOLIC BLOOD PRESSURE: 89 MMHG | OXYGEN SATURATION: 96 % | WEIGHT: 281.2 LBS | RESPIRATION RATE: 18 BRPM

## 2025-06-02 LAB
HGB BLD-MCNC: 10.1 G/DL (ref 11.7–15.7)
MAGNESIUM SERPL-MCNC: 2.2 MG/DL (ref 1.7–2.3)
MCV RBC AUTO: 76 FL (ref 78–100)
POTASSIUM SERPL-SCNC: 4.7 MMOL/L (ref 3.4–5.3)

## 2025-06-02 PROCEDURE — 99239 HOSP IP/OBS DSCHRG MGMT >30: CPT | Performed by: INTERNAL MEDICINE

## 2025-06-02 PROCEDURE — 93971 EXTREMITY STUDY: CPT | Mod: RT

## 2025-06-02 PROCEDURE — 83735 ASSAY OF MAGNESIUM: CPT | Performed by: INTERNAL MEDICINE

## 2025-06-02 PROCEDURE — 250N000011 HC RX IP 250 OP 636: Performed by: INTERNAL MEDICINE

## 2025-06-02 PROCEDURE — 250N000013 HC RX MED GY IP 250 OP 250 PS 637

## 2025-06-02 PROCEDURE — 250N000013 HC RX MED GY IP 250 OP 250 PS 637: Performed by: INTERNAL MEDICINE

## 2025-06-02 PROCEDURE — 250N000013 HC RX MED GY IP 250 OP 250 PS 637: Performed by: PHYSICIAN ASSISTANT

## 2025-06-02 PROCEDURE — 250N000013 HC RX MED GY IP 250 OP 250 PS 637: Performed by: HOSPITALIST

## 2025-06-02 PROCEDURE — 99232 SBSQ HOSP IP/OBS MODERATE 35: CPT | Mod: FS | Performed by: PHYSICIAN ASSISTANT

## 2025-06-02 PROCEDURE — 36415 COLL VENOUS BLD VENIPUNCTURE: CPT | Performed by: INTERNAL MEDICINE

## 2025-06-02 PROCEDURE — 85018 HEMOGLOBIN: CPT | Performed by: INTERNAL MEDICINE

## 2025-06-02 PROCEDURE — 999N000128 HC STATISTIC PERIPHERAL IV START W/O US GUIDANCE

## 2025-06-02 PROCEDURE — 84132 ASSAY OF SERUM POTASSIUM: CPT | Performed by: INTERNAL MEDICINE

## 2025-06-02 RX ORDER — DIGOXIN 125 MCG
125 TABLET ORAL DAILY
Qty: 30 TABLET | Refills: 1 | Status: SHIPPED | OUTPATIENT
Start: 2025-06-03

## 2025-06-02 RX ORDER — FERROUS SULFATE 325(65) MG
325 TABLET ORAL EVERY OTHER DAY
Qty: 30 TABLET | Refills: 1 | Status: SHIPPED | OUTPATIENT
Start: 2025-06-03

## 2025-06-02 RX ORDER — PANTOPRAZOLE SODIUM 40 MG/1
40 TABLET, DELAYED RELEASE ORAL
Qty: 60 TABLET | Refills: 1 | Status: SHIPPED | OUTPATIENT
Start: 2025-06-02

## 2025-06-02 RX ORDER — DIGOXIN 125 MCG
125 TABLET ORAL DAILY
Status: DISCONTINUED | OUTPATIENT
Start: 2025-06-02 | End: 2025-06-02 | Stop reason: HOSPADM

## 2025-06-02 RX ORDER — METOPROLOL SUCCINATE 25 MG/1
25 TABLET, EXTENDED RELEASE ORAL 2 TIMES DAILY
Qty: 60 TABLET | Refills: 0 | Status: SHIPPED | OUTPATIENT
Start: 2025-06-02 | End: 2025-07-02

## 2025-06-02 RX ORDER — TORSEMIDE 20 MG/1
20 TABLET ORAL DAILY
Qty: 30 TABLET | Refills: 1 | Status: SHIPPED | OUTPATIENT
Start: 2025-06-03

## 2025-06-02 RX ORDER — AMIODARONE HYDROCHLORIDE 400 MG/1
400 TABLET ORAL 2 TIMES DAILY
Qty: 8 TABLET | Refills: 0 | Status: SHIPPED | OUTPATIENT
Start: 2025-06-02 | End: 2025-06-06

## 2025-06-02 RX ORDER — AMIODARONE HYDROCHLORIDE 200 MG/1
200 TABLET ORAL 2 TIMES DAILY
Qty: 60 TABLET | Refills: 1 | Status: SHIPPED | OUTPATIENT
Start: 2025-06-06

## 2025-06-02 RX ADMIN — DIGOXIN 125 MCG: 125 TABLET ORAL at 08:26

## 2025-06-02 RX ADMIN — PANTOPRAZOLE SODIUM 40 MG: 40 TABLET, DELAYED RELEASE ORAL at 15:48

## 2025-06-02 RX ADMIN — DOXYCYCLINE HYCLATE 100 MG: 100 CAPSULE ORAL at 08:26

## 2025-06-02 RX ADMIN — OXYCODONE HYDROCHLORIDE 5 MG: 5 TABLET ORAL at 16:10

## 2025-06-02 RX ADMIN — TORSEMIDE 20 MG: 20 TABLET ORAL at 08:27

## 2025-06-02 RX ADMIN — DIGOXIN 250 MCG: 0.25 INJECTION INTRAMUSCULAR; INTRAVENOUS at 00:18

## 2025-06-02 RX ADMIN — LAMOTRIGINE 100 MG: 100 TABLET ORAL at 08:27

## 2025-06-02 RX ADMIN — AMIODARONE HYDROCHLORIDE 400 MG: 200 TABLET ORAL at 08:27

## 2025-06-02 RX ADMIN — METOPROLOL SUCCINATE 25 MG: 25 TABLET, EXTENDED RELEASE ORAL at 08:26

## 2025-06-02 RX ADMIN — ACETAMINOPHEN 975 MG: 325 TABLET, FILM COATED ORAL at 08:27

## 2025-06-02 RX ADMIN — ALPRAZOLAM 0.25 MG: 0.25 TABLET ORAL at 08:27

## 2025-06-02 RX ADMIN — CYANOCOBALAMIN TAB 1000 MCG 1000 MCG: 1000 TAB at 08:27

## 2025-06-02 RX ADMIN — PANTOPRAZOLE SODIUM 40 MG: 40 TABLET, DELAYED RELEASE ORAL at 08:28

## 2025-06-02 ASSESSMENT — ACTIVITIES OF DAILY LIVING (ADL)
ADLS_ACUITY_SCORE: 44

## 2025-06-02 NOTE — PLAN OF CARE
Neuro: Alert and oriented x 4, anxious  Vital signs: VSS, 2 L NC applied while resting, pt was desating.  Respiratory: Denies WISE  Pain:Complained of LE pain and back pain, oxycodone given once this night  Tele:Afib RVR, HR in the 100s, digoxin given   Mobility:Up with SBA with walker  Drips/Drains: N/A  Aggression color:Green  Plan/Test/Consult:Discharging home today  Labs:  Misc:  Loss of IV access on the R arm, extravasation. New PIV in, Melatonin given, slept intermittent

## 2025-06-02 NOTE — DISCHARGE INSTRUCTIONS
Plan  Continue rate control  Amiodarone 400 mg BID until 6/6/25, then transition to 200 mg BID  Metoprolol succinate 25 mg BID  Continue on low-dose BB given patient history of cocaine use  Start digoxin 125 mcg daily PO  OK to discharge from EP perspective, we will sign off  Plan to follow-up outpatient next week to obtain digoxin levels [facility]  General cardiology follow-up in 4 weeks  Ideal plan is to perform atrial flutter ablation w/ concomitant Watchman device implant. Continue to review outpatient

## 2025-06-02 NOTE — PROGRESS NOTES
Patient with multiple IV attempts by staff and VAT for purpose of administration of antiarrhythmic medication.  Patient consents to use of her hand.  Patient given prn po anxiolytic, then IV successfully placed, see flowsheet.

## 2025-06-02 NOTE — PROGRESS NOTES
Patient states she has had multiple IV attempts, and really doesn't want multiple attempts for access.  Upon superficial insertion of needle through skin, patient begins bucking in the bed, moving legs, tensing arm, vein which had been successfully cannulated in left lower arm infiltrates with patient movements.  Attempt with other RN holding left arm, patient still very tense, and initial successful cannulation infiltrates.  Patient stating she does not want any other attempts.  Offer LMX cream over very prominent bilateral hand veins, patient states she does not want an IV in her hand.  Discuss with primary RN.  Patient in need of further IV medications for current dysrhythmia.  Patient relays only IV start she will accept is an ultrasound start.  Informative conversation with patient on additional risks of deep vein cannulation, and inability to utilize several areas of her arms already secondary to previous medication extravasations.

## 2025-06-02 NOTE — PROGRESS NOTES
Care Management Follow Up    Length of Stay (days): 11    Expected Discharge Date: 06/02/2025     Concerns to be Addressed: discharge planning     Patient plan of care discussed at interdisciplinary rounds: Yes    Anticipated Discharge Disposition: Group Home              Anticipated Discharge Services: None  Anticipated Discharge DME: None    Patient/family educated on Medicare website which has current facility and service quality ratings:    Education Provided on the Discharge Plan:    Patient/Family in Agreement with the Plan: yes    Referrals Placed by CM/SW:  [facility] for PT  Private pay costs discussed: transportation costs    Discussed  Partnership in Safe Discharge Planning  document with patient/family: No     Handoff Completed: No, handoff not indicated or clinically appropriate    Additional Information:  Called [facility] and spoke with Rafat about potential discharge today.  Writer inquired if medical transport was requested which Rafat stated, YES.  Rafat inquired if patient will be on anticoagulant,  that medications be sent to the contracted pharmacy listed.  Writer will follow up with questions, voicemail left on Rafat phone at [phone].  Ride set up today via [employer].  Met with patient about setting up ride with [employer] via Wheelchair.  She was informed of potential estimate cost of current market rate.  She stated she understood cost of ride.    Next Steps: Await Discharge order.          Kaylah Roman, Care Management RN, N  [facility] - FLOAT   Contact: via Amadeo or Unit CM

## 2025-06-02 NOTE — PLAN OF CARE
Goal Outcome Evaluation:  Neuro- A&OX4   Most Recent Vitals- Temp: 98.6  F (37  C) Temp src: Oral BP: 137/89 Pulse: 100   Resp: 18 SpO2: 96 % O2 Device: None (Room air)   Tele/Cardiac- A-flutter CVR   Resp- RA  Activity- SBA  Pain- Prn oxycodone given for c/o leg pain with good relief   Drips- none   Drains/Tubes- P-IVX1 removed   Skin-   GI/- WDL  Aggression Color- Green  COVID status- Negative  Plan- pt was discharged back to her Group home at 1700 via w/c ride.   Misc-     Isabel Mcleod RN

## 2025-06-02 NOTE — PROGRESS NOTES
Care Management Discharge Note    Discharge Date: 06/02/2025       Discharge Disposition: Group Home    Discharge Services: HHC via [facility]    Discharge DME: None    Discharge Transportation: agency    Private pay costs discussed: transportation costs    Does the patient's insurance plan have a 3 day qualifying hospital stay waiver?  No      Education Provided on the Discharge Plan:  Yes  Persons Notified of Discharge Plans: Patient and Rafat from [facility]  Patient/Family in Agreement with the Plan: yes    Handoff Referral Completed: No, handoff not indicated or clinically appropriate    Additional Information:  Ride set up with [facility] [employer] for today between [id number] via Wheelchair.  [facility].  Discharge orders faxed to [facility] @fax [phone].  Discharge orders faxed to [facility] @ fax [phone].    Called Rafat at [phone] to update about time of transport and confirmed that discharge orders have been received.    Called [facility] and updated that Rafat at [facility] should be the main contact for appointments and gave Rafat's phone number and they confirmed orders were received at [facility].   Updated Patient about Transportation Time and plan. Patient verbally acknowledged plan.    Pt is ready for discharge per care management.  See below appointments for patient.  Jun 9 ZIOPATCH MONITOR  Monday Jun 9, 2025 1:50 PM [facility] [address] [address] [phone]          LAB  Monday Jun 9, 2025 2:15 PM  Please do not eat 10-12 hours before your appointment if you are coming in fasting for labs on lipids, cholesterol, or glucose (sugar). Does not apply to pregnant women. Plain water is allowed. Do not drink other fluids, diet soda, or gum. However, in some cases black coffee or tea may be ok, but please ask your doctor first. If you have concerns about taking  your medications, please send a message by clicking on Secure Messaging, Message Your Care Team. [facility]  [address] [place], [place] [address]  [facility] [address]  [phone]   Jul 1 LAB  Tuesday Jul 1, 2025 10:00 AM  Please do not eat 10-12 hours before your appointment if you are coming in fasting for labs on lipids, cholesterol, or glucose (sugar). Does not apply to pregnant women. Plain water is allowed. Do not drink other fluids, diet soda, or gum. However, in some cases black coffee or tea may be ok, but please ask your doctor first. If you have concerns about taking your medications, please send a message by clicking on Secure Messaging, Message Your Care Team. [facility]  [address] [place] [address]  [phone]          Return Cardiology with Joseline Reyes  Tuesday Jul 1, 2025 11:10 AM [address] [address] [phone]   Jul 18 ECHO COMPLETE  Friday Jul 18, 2025 12:30 PM  1. Please bring or wear a comfortable two-piece outfit.  2. You may eat, drink and take your normal medicines.  3. Please do not apply perfumes or lotions on the day of your exam.   4. For any questions that cannot be answered, please contact the ordering physician [facility]  [address] [place], [place] [id number]  [facility] [address] [phone]   Aug    5 New EP with Stas Gordon  Tuesday Aug 5, 2025 12:05 PM [address] [address] [phone]          Kaylah Roman, Care Management RN, OCULISSES  [facility]   Contact: via Amadeo

## 2025-06-02 NOTE — PROGRESS NOTES
Patient set up for ride and details are listed below.      Date of ride: 6/2/25  Time of ride: 1524-9852 Method of ride: Wheelchair  [facility]  [phone]  From: [facility] room 271  To:   Address Phone Email     [address] [facility] [address]         Weight:  pounds, 281 MRSA infection precautions;  O2 needs-none      Kaylah Roman, Care Management RN, OCN  [facility] - FLOAT   Contact: via Amadeo

## 2025-06-02 NOTE — PROVIDER NOTIFICATION
MD Notification    Notified Person: MD    Notified Person Name:RONALD Jerez    Notification Date/Time:6/1/25 1258    Notification Interaction:Vocera    Purpose of Notification:Pt loss of IV access, we tried twice and unable to get it. Vascular access team tried and missed once and willing to try again. Pt had dig scheduled tonight for Afib RVR. Pt is refusing IV. Please advise, do you have time to chat with the pt regarding IV.    Orders Received:    Comments:

## 2025-06-02 NOTE — PROGRESS NOTES
[facility]    EP Progress Note    Date of Service (when I saw the patient): 06/02/2025     Assessment & Plan   Omayra Malone is a 60 year old female with history of liver cirrhosis hepatitis C, polysubstance abuse (recent cocaine), MEREDITH, hypothyroidism, hypertension, chronic anemia residing in a group home who was admitted on 5/22/2025 with 2:1 atrial flutter RVR requiring cardioversion in the ED with recurrence, subsequently started on IV (now oral) amiodarone, with ongoing intermittent RVR despite ongoing diltiazem infusion. Low dose metoprolol has been on hold given intermittent hypotension. Echo performed 5/22/25 showed new CM with LVEF 35%, presumed to be tachycardia-mediated. She has since diuresed 40 pounds since admission, which remains stable.    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 Hgb improved to 10.1 today. Rate control approach remains the preferred option at this time. Patient continues to have minimal symptoms in regards to her atrial flutter w/ RVR when her rates are better controlled. Now on amiodarone 400 mg BID and Toprol XL 25 mg BID. HR 's today. -120.    Plan  Continue rate control  Amiodarone 400 mg BID until 6/6/25, then transition to 200 mg BID  Metoprolol succinate 25 mg BID  Continue on low-dose BB given patient history of cocaine use  Start digoxin 125 mcg daily PO  OK to discharge from EP perspective, we will sign off  Plan to follow-up outpatient next week to obtain digoxin levels and [facility]  General cardiology follow-up in 4 weeks  Ideal plan is to perform atrial flutter ablation w/ concomitant Watchman device implant. Continue to review outpatient    Glenn Muro PA-C    Interval History   No complaints of chest pain, pressure or tightness.  No orthopnea, PND or edema. No change in exercise tolerance or breathing.  Overall, she is feeling well.       Physical Exam   Temp: 98.7  F (37.1  C) Temp src: Oral BP: 104/66 Pulse: 116 (with activity)   Resp: 16 SpO2: 95 % O2 Device: None (Room air) Oxygen Delivery: 2 LPM  Vitals:    05/31/25 0521 06/01/25 0500 06/02/25 0308   Weight: 129.6 kg (285 lb 11.2 oz) 128.6 kg (283 lb 9.6 oz) 127.6 kg (281 lb 3.2 oz)     Vital Signs with Ranges  Temp:  [98.1  F (36.7  C)-98.7  F (37.1  C)] 98.7  F (37.1  C)  Pulse:  [] 116  Resp:  [16] 16  BP: (104-132)/(66-87) 104/66  SpO2:  [86 %-95 %] 95 %  I/O last 3 completed shifts:  In: 960 [P.O.:960]  Out: 3300 [Urine:3300]    Telemetry: Persistent atrial flutter, rates . No pauses noted.    Constitutional: awake, alert, cooperative, no apparent distress, and appears stated age  Respiratory: No increased work of breathing, good air exchange, clear to auscultation bilaterally, no crackles or wheezing  Cardiovascular: Irregular rate and rhythm, normal S1 and S2, no S3 or S4, and no murmur noted  Extremities  No pitting LE edema  Neurologic: Awake, alert, oriented to name, place and time.  Cranial nerves II-XII are grossly intact.      Medications   Current Facility-Administered Medications   Medication Dose Route Frequency Provider Last Rate Last Admin    lactated ringers infusion   Intravenous Continuous Merlyn Taylor PA-C        Patient is already receiving anticoagulation with heparin, enoxaparin (LOVENOX), warfarin (COUMADIN)  or other anticoagulant medication   Does not apply Continuous PRN Loco Murray MD        Patient to take 2/3 (66%) of the usual morning dose of NPH (N); Nurse to give 2/3 (66%) of the usual morning dose of intermediate acting insulin NPH (N).   Does not apply Continuous PRN Merlyn Taylor PA-C        Patient to take 80% of the usual dose evening before procedure. Nurse to give 80% of the usual dose of long acting insulin the evening before the procedure. insulin glargine (LANTUS, TOUJEO), insulin detemir  (LEVEMIR), insulin degludec (TRESIBA).   Does not apply Continuous PRN Merlyn Taylor PA-C         Current Facility-Administered Medications   Medication Dose Route Frequency Provider Last Rate Last Admin    [START ON 6/6/2025] amiodarone (PACERONE) tablet 200 mg  200 mg Oral BID Huyen Perez PA-C        amiodarone (PACERONE) tablet 400 mg  400 mg Oral BID Huyen Perez PA-C   400 mg at 06/02/25 0827    cyanocobalamin (VITAMIN B-12) tablet 1,000 mcg  1,000 mcg Oral Daily Clayton Freire MD   1,000 mcg at 06/02/25 0827    digoxin (LANOXIN) tablet 125 mcg  125 mcg Oral Daily Merlyn Taylor PA-C   125 mcg at 06/02/25 0826    doxycycline hyclate (VIBRAMYCIN) capsule 100 mg  100 mg Oral BID Loco Murray MD   100 mg at 06/02/25 0826    ferrous sulfate (FEROSUL) tablet 325 mg  325 mg Oral Every Other Day Clayton Freire MD   325 mg at 06/01/25 0924    lamoTRIgine (LaMICtal) tablet 100 mg  100 mg Oral Daily Loco Murray MD   100 mg at 06/02/25 0827    metoprolol succinate ER (TOPROL XL) 24 hr tablet 25 mg  25 mg Oral BID Huyen Perez PA-C   25 mg at 06/02/25 0826    nicotine (NICODERM CQ) 7 MG/24HR 24 hr patch 1 patch  1 patch Transdermal Daily Loki Anderson MD        pantoprazole (PROTONIX) EC tablet 40 mg  40 mg Oral BID AC Clayton Freire MD   40 mg at 06/02/25 0828    sodium chloride (PF) 0.9% PF flush 3 mL  3 mL Intracatheter Q8H PHU Loco Murray MD   3 mL at 06/01/25 2132    torsemide (DEMADEX) tablet 20 mg  20 mg Oral Daily Clayton Freire MD   20 mg at 06/02/25 0827       Data   I personally reviewed no images or EKG's today.  Results for orders placed or performed during the hospital encounter of 05/22/25 (from the past 24 hours)   Potassium   Result Value Ref Range    Potassium 4.7 3.4 - 5.3 mmol/L   Magnesium   Result Value Ref Range    Magnesium 2.2 1.7 - 2.3 mg/dL   Hemoglobin   Result Value Ref Range    Hemoglobin 10.1 (L) 11.7 - 15.7 g/dL    MCV 76 (L) 78 - 100  fL

## 2025-06-02 NOTE — DISCHARGE SUMMARY
"[facility]  Hospitalist Discharge Summary      Date of Admission:  5/22/2025  Date of Discharge:  6/2/2025  Discharging Provider: Dejon Kim MD  Discharge Service: Hospitalist Service    Discharge Diagnoses     Acute heart failure with reduced EF of 35%, decompensated  Atrial flutter with RVR-status post DCCV on 5/22, flipped to normal sinus rhythm, now in A flutter  Acute hypoxic respiratory failure multifactorial from heart failure exacerbation, atrial flutter, Anemia, obstructive sleep apnea, OHS, deconditioning.  Moderate tricuspid regurgitation.  Mild pulmonary hypertension.  Mild to moderate MR.  Hypertension.  Hypokalemia, hypomagnesemia.  Likely from diuresis.    Acute on chronic anemia -likely dilutional, rule out blood loss.  Microcytic anemia, severe iron deficiency.  Vitamin B12 deficiency.  Cirrhosis secondary to Hep C and NAFLD  LUE and RUE superficial vein thrombosis    Left hand pain      Cocaine use.    Acute on chronic anxiety.  Bipolar affective disorder  Chronic leg and back pain     History of hypothyroidism.     Medically complicated obesity s/p gastric bypass  Body mass index is 55.85 kg/m .   MEREDITH  Chronic doxycycline use  Physical deconditioning from medical illness.  Nicotine use.    Clinically Significant Risk Factors     # Morbid Obesity: Estimated body mass index is 48.27 kg/m  as calculated from the following:    Height as of this encounter: 1.626 m (5' 4\").    Weight as of this encounter: 127.6 kg (281 lb 3.2 oz).       Follow-ups Needed After Discharge   Follow-up Appointments       Hospital Follow-up with Existing Primary Care Provider (PCP)          Schedule Primary Care visit within: 30 Days             Unresulted Labs Ordered in the Past 30 Days of this Admission       No orders found from 4/22/2025 to 5/23/2025.            Discharge Disposition   Discharged to Group home  Condition at discharge: Stable    Hospital Course   Omayra Malone is a 60 year " old female with history of hypertension, hepatitis C, liver cirrhosis, chronic anemia, obesity status post gastric bypass, bipolar affective disorder, MEREDITH, hypothyroidism, osteoarthritis admitted on 5/22/2025 for shortness of breath, palpitations.     Acute heart failure with reduced EF of 35%, decompensated  Atrial flutter with RVR-status post DCCV on 5/22, flipped to normal sinus rhythm, now in A flutter  Acute hypoxic respiratory failure multifactorial from heart failure exacerbation, atrial flutter, Anemia, obstructive sleep apnea, OHS, deconditioning.  Moderate tricuspid regurgitation.  Mild pulmonary hypertension.  Mild to moderate MR.  Hypertension.  *Patient resident of [facility], presented with shortness of breath and palpitations.  Patient initially denied drug use,  urine positive for cocaine.  Reports recent use.  *On EMS arrival the patient was found to be in SVT at a pulse of 176. En route to the ED they attempted 6mg Adenosine, followed shortly after by another 12 mg with no success. -SpO2 was 94% en route to the ED. in ED heart rate in 170s, blood pressure in 120s systolic.  Sodium 141, potassium 3.9, creatinine 0.62.  *WBC 9.6, TSH 2.06.  N-terminal proBNP 1648.  Troponin high-sensitivity 12 >1  *Chest x-ray no definitive evidence for pulmonary edema, no concerns for infection.  *EKG sinus tachycardia, left axis deviation, ST-T wave changes.  *Echocardiogram with LVEF of 35%, moderate global hypokinesis of the left ventricle.  Right ventricle  mildly dilated.  Moderate mitral regurgitation, moderate tricuspid regurgitation.  Mild pulmonary hypertension.  *5/23 - was on intravenous heparin drip, initiated IV Lasix drip for diuresis.  *5/24 --in A-fib/flutter with rates in 140s to 150s.  Initiated intravenous amiodarone drip.    *5/27- had chest pain, trop was negative. Seems reproducible. CT of the chest ordered by cardiology was negative for aortic dissection, no acute findings     - 5/29- rate  controlled with HR <100 and is on Diltiazem drip which was initiated on 5/27 by EP and transition to p.o. metoprolol tartrate.  Changed to Toprol-XL 25 mg twice daily on 05/30.  - Amio drip transition to PO amiodarone 400mg BID on 5/26/2025 . Plan to  -continue amiodarone at 400 mg BID x 1 more week (6/6) then decrease to 200 mg BID      -Had been on intravenous heparin drip that is discontinued 05/30  - Lasix drip-->> Torsemide 20mg BID on 5/26/2025, BUN/Cr slightly going up on 5/27, reduced to Torsemide 20mg daily   - Hold prior to admission amlodipine, hydrochlorothiazide to allow room for titration of rate controlling agents     5/30: Continued tachycardia in 130s.  Requested EP to re-review   5/31: Had a loading dose of digoxin and then started on low-dose digoxin on discharge.Plan to follow-up outpatient next week to obtain digoxin levels and place 3-day Ziopatc      Hypokalemia, hypomagnesemia.  Likely from diuresis.  Keep potassium around 4, magnesium around 2, replacement protocols ordered     Acute on chronic anemia -likely dilutional, rule out blood loss.  Microcytic anemia, severe iron deficiency.  Vitamin B12 deficiency.  Hemoglobin 8.0 from previous 11.8 1-year ago, patient denies bleeding or melena. Does have history of gastric bypass does not appear to be on B12.    *Iron saturation index 4, vitamin B12 150.  CK29.  Folate 16.7.  UA no hematuria.  *IV Venofer for 2 doses -5/23, 5/24.   - started on oral iron, vitamin B12 supplements, continue  - change IV PPI to PO PPI  - Consent for blood transfusion obtained, transfuse for hemoglobin less than 8 or symptomatic.    - [facility] following, status post EGD 05/29: Stenotic, friable GJ anastomosis without active bleeding.  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    pantoprazole as above.     NEDE superficial vein thrombosis  *had prior IV access there and also recent extravasation of IV Protonix.  *House MEGGAN evaluated on 5/26 evening, refer to note for detail. Noted some erythema and edema  *LUE US (5/27)1.  Positive for cephalic vein thrombus at the distal humerus and antecubital fossa spanning greater than 5 cm.  2.  No deep venous thrombosis in the left upper extremity.  - erythema much improved on 5/28, no tenderness, has palpable cord over the antecubital fossa consistent with imaging finding of SVT  - continue supportive measures-elevation, warm compresses.      Left hand pain  Reports sudden pain while holding something a few weeks back.  Patient has been receiving diuresis with intravenous Lasix, significant improvement in swelling, erythema.  Continues to complain of pain.    Afebrile, no leukocytosis CRP 4.74, .  X-ray left hand / thumb (5/25)-Negative for fracture or dislocation. Moderate soft tissue swelling in the thumb. Moderate of osteoarthritis at the base of the thumb and the in CMC and STT joints, and throughout the IP joints with joint space narrowing and spurring.   Monitor closely.      Cocaine use.  Patient initially denied drug use, group home staff reported concern for cocaine use.  Urine drug screen positive for cocaine.  Patient reports recent use -with her boyfriend.  Emphasized need to consider abstinence, will need chemical dependency evaluation once closer to discharge.      Acute on chronic anxiety.  Bipolar affective disorder  Continue PTA lamictal 100mg daily  Continue PTA xanax PRN at reduce dose (does not take when she is prescribed oxycodone which she currently is for leg pain)  As needed Atarax 4 times a day     Chronic leg and back pain   Follows with [facility] pain clinic, chronically on oxycodone.  Continue PTA oxycodone 5mg q8prn, minimize narcotic use as able to.     History of hypothyroidism.  PTA not on meds.  TSH  within normal limits     Medically complicated obesity s/p gastric bypass  Body mass index is 55.85 kg/m . Recommend outpatient follow up/ongoing weight loss.      MEREDITH  Does not use CPAP.  Consider sleep studies as outpatient.     Chronic doxycycline use  No clear indication on chart review, reports chronically used.  Continue for now      Physical deconditioning from medical illness.  PT, OT evaluation once above cardiac issue stablizes  Fall precautions.  Encourage ambulation out of bed as able to.     Nicotine use.  Reports intermittent vaping.  Emphasized need to consider abstinence, nicotine patch ordered    Consultations This Hospital Stay   PHARMACY IP CONSULT  RESPIRATORY CARE IP CONSULT  CARDIOLOGY IP CONSULT  PHARMACY IP CONSULT  PHARMACY IP CONSULT  GASTROENTEROLOGY IP CONSULT  CARE MANAGEMENT / SOCIAL WORK IP CONSULT  VASCULAR ACCESS ADULT IP CONSULT  VASCULAR ACCESS ADULT IP CONSULT  ELECTROPHYSIOLOGY IP CONSULT  PHYSICAL THERAPY ADULT IP CONSULT  VASCULAR ACCESS ADULT IP CONSULT    Code Status   Full Code    Time Spent on this Encounter   IDejon MD, personally saw the patient today and spent greater than 30 minutes discharging this patient.       Dejon Kim MD  [facility]  [address] [place], [place] [id number], [address]  [facility] [address]  Phone: [phone]  ______________________________________________________________________    Physical Exam   Vital Signs: Temp: 98.7  F (37.1  C) Temp src: Oral BP: 104/66 Pulse: 75   Resp: 16 SpO2: 95 % O2 Device: None (Room air) Oxygen Delivery: 2 LPM  Weight: 281 lbs 3.2 oz  Gen- pleasant   Neck- supple  CVS- I+II+ no m/r/g, tachycardia   RS- CTAB   Abdo- soft, no tenderness . No g/r/r   Ext- edema        Primary Care Physician   Tobi Mahoney    Discharge Orders      ZIO PATCH 3-7 DAYS APPLICATION     Basic metabolic panel     Digoxin level     Primary Care Referral      Follow-Up with Cardiology EP      Follow-Up with  Cardiology MEGGAN      Home Care Referral      Reason for your hospital stay    Omayra Malone is a 60 year old female with history of hypertension, hepatitis C, liver cirrhosis, chronic anemia, obesity status post gastric bypass, bipolar affective disorder, MEREDITH, hypothyroidism, osteoarthritis admitted on 5/22/2025 for shortness of breath, palpitations.     Acute heart failure with reduced EF of 35%, decompensated  Atrial flutter with RVR-status post DCCV on 5/22, flipped to normal sinus rhythm, now in A flutter  Acute hypoxic respiratory failure multifactorial from heart failure exacerbation, atrial flutter, Anemia, obstructive sleep apnea, OHS, deconditioning.  Moderate tricuspid regurgitation.  Mild pulmonary hypertension.  Mild to moderate MR.  Hypertension.     Activity    Your activity upon discharge: activity as tolerated     ZIO PATCH 3-7 DAYS (additional cost to patient)    This order will incur an additional cost to the patient. If the patient doesn't want to pay cost for application, please consider the Zio Patch Mail Out order.     Echocardiogram Complete    Administration of IV contrast will be tailored to this examination per the appropriate written protocol listed in the Echocardiography department Protocol Book, or by the supervising Cardiologist. This may result in an order change.    Use of contrast is at the discretion of the supervising Cardiologist.     Diet    Follow this diet upon discharge: Current Diet:Orders Placed This Encounter      Fluid restriction 2000 ML FLUID      Regular Diet Adult     Hospital Follow-up with Existing Primary Care Provider (PCP)          Zio Patch Mail Out       Significant Results and Procedures   Results for orders placed or performed during the hospital encounter of 05/22/25   XR Chest Port 1 View    Narrative    EXAM: XR CHEST PORT 1 VIEW  LOCATION: [facility]  DATE: 5/22/2025    INDICATION: Supraventricular tachycardia. Shortness of  breath. Evaluate for congestive heart failure.  COMPARISON: Chest x-ray 5/15/2024      Impression    IMPRESSION: Left costophrenic angle was not included on the image. Magnified cardiac silhouette size and pulmonary vascularity likely due to the AP projection. No definite evidence for pulmonary edema.   XR Hand Port Left G/E 3 Views    Narrative    EXAM: XR HAND PORT LEFT G/E 3 VIEWS  LOCATION: [facility]  DATE: 5/25/2025    INDICATION: Left hand and thumb pain.  COMPARISON: None.      Impression    IMPRESSION: Negative for fracture or dislocation. Moderate soft tissue swelling in the thumb. Moderate of osteoarthritis at the base of the thumb and the in CMC and STT joints, and throughout the IP joints with joint space narrowing and spurring.   US Upper Extremity Venous Duplex Left     Value    Radiologist flags See impression (Urgent)    Narrative    EXAM: US UPPER EXTREMITY VENOUS DUPLEX LEFT  LOCATION: [facility]  DATE: 5/27/2025    INDICATION: LUE AC extravasation, redness swelling firm  COMPARISON: None.  TECHNIQUE: Venous Duplex ultrasound of the left upper extremity with (when possible) and without compression, augmentation, and duplex. Color flow and spectral Doppler with waveform analysis performed.    FINDINGS: Ultrasound includes evaluation of the internal jugular vein, innominate vein, subclavian vein, axillary vein, and brachial vein. The superficial cephalic and basilic veins were also evaluated where seen.     LEFT: No deep venous thrombosis. Positive for nearly occlusive thrombus within the cephalic vein at the distal humerus and antecubital fossa at the previous IV site spanning greater than 5 cm.      Impression    IMPRESSION:   1.  Positive for cephalic vein thrombus at the distal humerus and antecubital fossa spanning greater than 5 cm.  2.  No deep venous thrombosis in the left upper extremity.    [Access Center: See impression]    This report  will be copied to the [facility] to ensure a provider acknowledges the finding. Access Center is available Monday through Friday 8am-3:30 pm.   CT Chest w Contrast    Narrative    EXAM: CT CHEST W CONTRAST  LOCATION: [facility]  DATE: 5/27/2025    INDICATION: chest pain, rule out aortic dissection  COMPARISON: None.  TECHNIQUE: CT chest with IV contrast. Multiplanar reformats were obtained. Dose reduction techniques were used.    CONTRAST: 135mL Isovue 370    FINDINGS:   LUNGS AND PLEURA: Dependent atelectasis. Lungs are otherwise clear. No pleural effusion or pneumothorax.    MEDIASTINUM/AXILLAE: No aortic dissection or aneurysm. No central pulmonary embolus. No lymphadenopathy.    CORONARY ARTERY CALCIFICATION: Moderate.    UPPER ABDOMEN: Normal.    MUSCULOSKELETAL: Normal.      Impression    IMPRESSION:   1.  No aortic dissection. No acute findings in the chest.   US Upper Extremity Venous Duplex Right    Narrative    EXAM: US UPPER EXTREMITY VENOUS DUPLEX RIGHT  LOCATION: [facility]  DATE: 6/2/2025    INDICATION: swelling, pain. r o DVT  COMPARISON: None.  TECHNIQUE: Venous Duplex ultrasound of the right upper extremity with (when possible) and without compression, augmentation, and duplex. Color flow and spectral Doppler with waveform analysis performed.    FINDINGS: Ultrasound includes evaluation of the internal jugular vein, innominate vein, subclavian vein, axillary vein, and brachial vein. The superficial cephalic and basilic veins were also evaluated where seen.     RIGHT: No deep venous thrombosis. Occlusive superficial thrombophlebitis within the cephalic vein in region of the mid humerus to antecubital fossa.      Impression    IMPRESSION:  1.  No deep venous thrombosis in the right upper extremity.  2.  Superficial thrombophlebitis within the cephalic vein.     -Cardioversion External    Narrative    Cheko Tse MD     5/23/2025   1:40 AM  [facility]    -Cardioversion External    Date/Time: 2025 11:05 PM    Performed by: Cheko Tse MD  Authorized by: Cheko Tse MD    Risks, benefits and alternatives discussed.      UNIVERSAL PROTOCOL   Site Marked: NA  Prior Images Obtained and Reviewed:  NA  Required items: Required blood products, implants, devices and special   equipment available    Patient identity confirmed:  Verbally with patient and arm band  Patient was reevaluated immediately before administering moderate or deep   sedation or anesthesia  Confirmation Checklist:  Relevant allergies, patient's identity using two   indicators, correct equipment/implants were available and procedure was   appropriate and matched the consent or emergent situation  Time out: Immediately prior to the procedure a time out was called    Universal Protocol: the Joint Commission Universal Protocol was followed      PRE-PROCEDURE DETAILS:     Cardioversion basis:  Emergent    Rhythm:  Atrial flutter    Electrode placement:  Anterior-posterior  Attempt one:     Cardioversion mode:  Synchronous    Waveform:  Biphasic    Shock (Joules):  200    Shock outcome:  Conversion to normal sinus rhythm  Post-procedure details:     Patient status:  Alert      PROCEDURE  Describe Procedure: Pads were applied anterior posterior.  After sedation   with etomidate, cardioversion was attempted with 200 J, synchronized.    Patient converted to normal sinus rhythm on the first attempt.  Patient Tolerance:  Patient tolerated the procedure well with no immediate   complications   Echocardiogram Complete     Value    LVEF  35%    Narrative    [id number]  [id number]  [id number]  [id number]^DORIAN^JOHANNA     [facility]  Echocardiography Laboratory  [address] [place] [address]     Name: FLAKITO MCCOY  MRN: [id number]  : 1965  Study Date: 2025 11:23 AM  Age: 60 yrs  Gender: Female  Patient Location:  [facility]  Reason For Study: Atrial Flutter  Ordering Physician: JOHANNA ALARCON  Referring Physician: JOHANNA ALARCON  Performed By: Emperatriz Burt     BSA: 2.4 m2  Height: 64 in  Weight: 321 lb  HR: 74  BP: 139/77 mmHg  ______________________________________________________________________________  Procedure  Echocardiogram with two-dimensional, color and spectral Doppler.  ______________________________________________________________________________  Interpretation Summary     1. The left ventricle is moderately dilated. The visual ejection fraction is  estimated at 35%. There is moderate global hypokinesia of the left ventricle.  2. The right ventricle is mildly dilated. Mildly decreased right ventricular  systolic function  3. There is mild to moderate (1-2+) mitral regurgitation.  4. There is moderate (2+) tricuspid regurgitation.  5. Mild (35-45mmHg) pulmonary hypertension is present. The right ventricular  systolic pressure is approximated at 41mmHg plus the right atrial pressure.     Echo 8/2024 from [facility] reported EF 55%.  ______________________________________________________________________________  Left Ventricle  The left ventricle is moderately dilated. The visual ejection fraction is  estimated at 35%. Left ventricular diastolic function is indeterminate. There  is moderate global hypokinesia of the left ventricle.     Right Ventricle  The right ventricle is mildly dilated. Mildly decreased right ventricular  systolic function.     Atria  The left atrium is severely dilated. The right atrium is moderately dilated.  There is no atrial shunt seen.     Mitral Valve  There is mild to moderate (1-2+) mitral regurgitation. Mitral mean 5mmHg, some  restriction of leaflet opening.     Tricuspid Valve  There is moderate (2+) tricuspid regurgitation. Mild (35-45mmHg) pulmonary  hypertension is present. The right ventricular systolic pressure is  approximated at 41mmHg plus the right atrial pressure.     Aortic  Valve  The aortic valve is normal in structure and function.     Pulmonic Valve  The pulmonic valve is normal in structure and function.     Vessels  Normal ascending, transverse (arch), and descending aorta. Dilation of the  inferior vena cava is present with abnormal respiratory variation in diameter.     Pericardium  There is no pericardial effusion.     Rhythm  Sinus rhythm was noted.  ______________________________________________________________________________  MMode/2D Measurements & Calculations  IVSd: 1.3 cm     LVIDd: 5.8 cm  LVIDs: 4.1 cm  LVPWd: 0.96 cm  FS: 28.8 %  LV mass(C)d: 269.2 grams  LV mass(C)dI: 112.6 grams/m2  Ao root diam: 3.1 cm  asc Aorta Diam: 3.5 cm  LVOT diam: 1.9 cm  LVOT area: 2.9 cm2  Ao root diam index Ht(cm/m): 1.9  Ao root diam index BSA (cm/m2): 1.3  Asc Ao diam index BSA (cm/m2): 1.5  Asc Ao diam index Ht(cm/m): 2.2  EF Biplane: 34.7 %  LA Volume (BP): 163.0 ml     LA Volume Index (BP): 68.2 ml/m2  RV Base: 4.2 cm  RWT: 0.33  TAPSE: 1.7 cm     Doppler Measurements & Calculations  MV E max yaya: 130.0 cm/sec  MV max P.9 mmHg  MV mean P.0 mmHg  MV V2 VTI: 41.7 cm  MVA(VTI): 1.3 cm2  MV dec time: 0.25 sec  Ao V2 max: 205.1 cm/sec  Ao max P.0 mmHg  Ao V2 mean: 138.8 cm/sec  Ao mean P.4 mmHg  Ao V2 VTI: 36.2 cm  GINA(I,D): 1.5 cm2  GINA(V,D): 1.3 cm2  LV V1 max PG: 3.6 mmHg  LV V1 max: 94.3 cm/sec  LV V1 VTI: 18.9 cm  SV(LVOT): 55.5 ml  SI(LVOT): 23.2 ml/m2  PA V2 max: 85.7 cm/sec  PA max P.9 mmHg  PA acc time: 0.10 sec  TR max yaya: 322.1 cm/sec  TR max P.5 mmHg     AV Yaya Ratio (DI): 0.46  GINA Index (cm2/m2): 0.64  E/E' avg: 15.7  Lateral E/e': 15.1  Medial E/e': 16.4     ______________________________________________________________________________  Report approved by: Jakob Wahl MD on 2025 12:26 PM             Discharge Medications   Current Discharge Medication List        START taking these medications    Details   !! amiodarone (PACERONE)  200 MG tablet Take 1 tablet (200 mg) by mouth 2 times daily.  Qty: 60 tablet, Refills: 1    Associated Diagnoses: Atrial flutter with rapid ventricular response (H)      !! amiodarone (PACERONE) 400 MG tablet Take 1 tablet (400 mg) by mouth 2 times daily for 4 days.  Qty: 8 tablet, Refills: 0    Associated Diagnoses: Atrial flutter with rapid ventricular response (H)      cyanocobalamin (CYANOCOBALAMIN) 1000 MCG tablet Take 1 tablet (1,000 mcg) by mouth daily.  Qty: 30 tablet, Refills: 1    Associated Diagnoses: Atrial flutter with rapid ventricular response (H)      digoxin (LANOXIN) 125 MCG tablet Take 1 tablet (125 mcg) by mouth daily.  Qty: 30 tablet, Refills: 1    Associated Diagnoses: Atrial flutter with rapid ventricular response (H)      ferrous sulfate (FEROSUL) 325 (65 Fe) MG tablet Take 1 tablet (325 mg) by mouth every other day.  Qty: 30 tablet, Refills: 1    Associated Diagnoses: Atrial flutter with rapid ventricular response (H)      metoprolol succinate ER (TOPROL XL) 25 MG 24 hr tablet Take 1 tablet (25 mg) by mouth 2 times daily.  Qty: 60 tablet, Refills: 0    Associated Diagnoses: Atrial flutter with rapid ventricular response (H)      pantoprazole (PROTONIX) 40 MG EC tablet Take 1 tablet (40 mg) by mouth 2 times daily (before meals).  Qty: 60 tablet, Refills: 1    Associated Diagnoses: Atrial flutter with rapid ventricular response (H)      torsemide (DEMADEX) 20 MG tablet Take 1 tablet (20 mg) by mouth daily.  Qty: 30 tablet, Refills: 1    Associated Diagnoses: Atrial flutter with rapid ventricular response (H)       !! - Potential duplicate medications found. Please discuss with provider.        CONTINUE these medications which have NOT CHANGED    Details   doxycycline hyclate (VIBRA-TABS) 100 MG tablet Take 100 mg by mouth 2 times daily.      lamoTRIgine (LAMICTAL) 100 MG tablet Take 100 mg by mouth daily.      nystatin (MYCOSTATIN) 751499 UNIT/GM external powder Apply topically daily as needed  for dry skin. To groin and under breasts      oxyCODONE (ROXICODONE) 5 MG tablet Take 5 mg by mouth every 8 hours.      ALPRAZolam (XANAX) 2 MG tablet Take 2 mg by mouth 2 times daily as needed for anxiety.           STOP taking these medications       amLODIPine (NORVASC) 10 MG tablet Comments:   Reason for Stopping:         hydrochlorothiazide (HYDRODIURIL) 25 MG tablet Comments:   Reason for Stopping:             Allergies   Allergies   Allergen Reactions    Gabapentin Swelling    Latex Rash    Sulfa Antibiotics Swelling

## 2025-06-03 NOTE — CARE PLAN
Physical Therapy Discharge Summary    Reason for therapy discharge:    Discharged to home with home therapy.    Progress towards therapy goal(s). See goals on Care Plan in [facility] electronic health record for goal details.  Goals partially met.  Barriers to achieving goals:   discharge from facility.    Therapy recommendation(s):    Continued therapy is recommended.  Rationale/Recommendations:  To further increase independence with mobility.

## 2025-06-04 ENCOUNTER — TELEPHONE (OUTPATIENT)
Dept: CARDIOLOGY | Facility: CLINIC | Age: 60
End: 2025-06-04

## 2025-06-04 NOTE — TELEPHONE ENCOUNTER
Patient was admitted to [facility] on 5/22/25 with 2:1 atrial flutter RVR requiring cardioversion in the ED with recurrence, subsequently started on IV (now oral) Amiodarone, with ongoing intermittent RVR despite ongoing Diltiazem infusion. Low dose metoprolol has been on hold given intermittent hypotension. Acute HF.    PMH: liver cirrhosis hepatitis C, polysubstance abuse (recent cocaine), MEREDITH, hypothyroidism, hypertension, chronic anemia residing in a group home.     5/23/25: Echo showed EF of 35% with moderate global hypokinesis of the left ventricle. Right ventricle mildly dilated. Moderate mitral regurgitation, moderate tricuspid regurgitation. Mild pulmonary hypertension. Presumed to be tachycardia-mediated.     5/27/25 and 5/29/25: EGD completed due to Hgb of 8.0. Stenotic, friable GJ anastomosis without active bleeding. 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    Ideal plan is to perform atrial flutter ablation w/ concomitant Watchman device implant. Continue to review outpatient.    Pt was started on Digoxin, Fe, Metoprolol, Demadex and Amiodarone taper of 400 mg po BID x 4 days, then 200 mg po BID. PTA Norvasc and hydrochlorothiazide were discontinued at time of discharge.    Pt is scheduled for placement of Zio Patch monitor on 6/9/25 at 1350, followed with labs at 1415. Scheduled for labs on 7/1/25 at 1000, followed with an OV at 1110 with MEGGAN Missy marquez at our [place] Office.    Pt discharged back to [facility] with [facility] services.    Writer attempted to call pt for a cardiology post discharge phone call, but no answer. VM left to return my call. JAQUI Mccormick RN.

## 2025-06-09 ENCOUNTER — LAB (OUTPATIENT)
Dept: LAB | Facility: CLINIC | Age: 60
End: 2025-06-09
Payer: COMMERCIAL

## 2025-06-09 ENCOUNTER — OFFICE VISIT (OUTPATIENT)
Dept: CARDIOLOGY | Facility: CLINIC | Age: 60
End: 2025-06-09
Payer: COMMERCIAL

## 2025-06-09 ENCOUNTER — RESULTS FOLLOW-UP (OUTPATIENT)
Dept: CARDIOLOGY | Facility: CLINIC | Age: 60
End: 2025-06-09

## 2025-06-09 DIAGNOSIS — I48.92 ATRIAL FLUTTER WITH RAPID VENTRICULAR RESPONSE (H): ICD-10-CM

## 2025-06-09 LAB
ANION GAP SERPL CALCULATED.3IONS-SCNC: 14 MMOL/L (ref 7–15)
BUN SERPL-MCNC: 62.3 MG/DL (ref 8–23)
CALCIUM SERPL-MCNC: 9.6 MG/DL (ref 8.8–10.4)
CHLORIDE SERPL-SCNC: 99 MMOL/L (ref 98–107)
CREAT SERPL-MCNC: 1.54 MG/DL (ref 0.51–0.95)
DIGOXIN SERPL-MCNC: 1 NG/ML (ref 0.6–1.2)
EGFRCR SERPLBLD CKD-EPI 2021: 38 ML/MIN/1.73M2
GLUCOSE SERPL-MCNC: 58 MG/DL (ref 70–99)
HCO3 SERPL-SCNC: 26 MMOL/L (ref 22–29)
POTASSIUM SERPL-SCNC: 3.1 MMOL/L (ref 3.4–5.3)
SODIUM SERPL-SCNC: 139 MMOL/L (ref 135–145)

## 2025-06-09 NOTE — PROGRESS NOTES
Omayra Malone arrived here on 6/9/2025 2:41 PM for 3-7 Days  Zio monitor placement per ordering provider Jaskaran Muro PAC for the diagnosis A.  Patient s skin was prepped per protocol. Dr. Jama is the supervising MD.  Zio monitor was placed.  Instructions were reviewed with and given to the patient.  Patient verbalized understanding of wear, troubleshooting and monitor return instructions.    Order [id number]   Serial number [id number]  Liudmila Lara, EMT

## 2025-06-18 ENCOUNTER — TELEPHONE (OUTPATIENT)
Dept: CARDIOLOGY | Facility: CLINIC | Age: 60
End: 2025-06-18
Payer: COMMERCIAL

## 2025-06-25 ENCOUNTER — TELEPHONE (OUTPATIENT)
Dept: CARDIOLOGY | Facility: CLINIC | Age: 60
End: 2025-06-25
Payer: COMMERCIAL

## 2025-06-25 DIAGNOSIS — E87.6 HYPOKALEMIA: Primary | ICD-10-CM

## 2025-06-25 DIAGNOSIS — E87.6 HYPOKALEMIA: ICD-10-CM

## 2025-06-25 DIAGNOSIS — I48.92 ATRIAL FLUTTER WITH RAPID VENTRICULAR RESPONSE (H): ICD-10-CM

## 2025-06-25 RX ORDER — POTASSIUM CHLORIDE 1500 MG/1
TABLET, EXTENDED RELEASE ORAL
Qty: 90 TABLET | Refills: 1 | Status: SHIPPED | OUTPATIENT
Start: 2025-06-25

## 2025-06-25 RX ORDER — POTASSIUM CHLORIDE 1500 MG/1
TABLET, EXTENDED RELEASE ORAL
Qty: 92 TABLET | Refills: 1 | OUTPATIENT
Start: 2025-06-25

## 2025-06-25 RX ORDER — POTASSIUM CHLORIDE 1500 MG/1
TABLET, EXTENDED RELEASE ORAL
Qty: 92 TABLET | OUTPATIENT
Start: 2025-06-25

## 2025-06-25 RX ORDER — TORSEMIDE 20 MG/1
TABLET ORAL
Qty: 45 TABLET | Refills: 2 | Status: SHIPPED | OUTPATIENT
Start: 2025-06-25

## 2025-06-25 NOTE — TELEPHONE ENCOUNTER
Spoke to patient to review results of BMP and recommendation per PARVIN Almanzar:  Please have patient decrease torsemide to 10 mg once daily and start potassium chloride. She will need to take 40 mEq x 2 days, then can decrease to 20 mEq once daily. Repeat BMP in 1 week.   Medication list updated and script sent to the pharmacy.  Follow up lab scheduled for 7/3 at 11am.   Patient provided verbal understanding regarding above.  JHOAN Lama

## 2025-06-26 ENCOUNTER — TRANSCRIBE ORDERS (OUTPATIENT)
Dept: OTHER | Age: 60
End: 2025-06-26

## 2025-06-26 DIAGNOSIS — I48.92 ATRIAL FLUTTER WITH RAPID VENTRICULAR RESPONSE (H): Primary | ICD-10-CM

## 2025-06-30 ENCOUNTER — PATIENT OUTREACH (OUTPATIENT)
Dept: CARE COORDINATION | Facility: CLINIC | Age: 60
End: 2025-06-30
Payer: COMMERCIAL

## 2025-07-01 ENCOUNTER — OFFICE VISIT (OUTPATIENT)
Dept: CARDIOLOGY | Facility: CLINIC | Age: 60
End: 2025-07-01
Payer: COMMERCIAL

## 2025-07-01 ENCOUNTER — LAB (OUTPATIENT)
Dept: LAB | Facility: CLINIC | Age: 60
End: 2025-07-01
Payer: COMMERCIAL

## 2025-07-01 VITALS
BODY MASS INDEX: 49.22 KG/M2 | DIASTOLIC BLOOD PRESSURE: 77 MMHG | SYSTOLIC BLOOD PRESSURE: 129 MMHG | OXYGEN SATURATION: 97 % | WEIGHT: 288.3 LBS | HEIGHT: 64 IN | HEART RATE: 67 BPM

## 2025-07-01 DIAGNOSIS — I48.92 ATRIAL FLUTTER WITH RAPID VENTRICULAR RESPONSE (H): ICD-10-CM

## 2025-07-01 DIAGNOSIS — I50.21 ACUTE SYSTOLIC HEART FAILURE (H): Primary | ICD-10-CM

## 2025-07-01 DIAGNOSIS — I48.92 ATRIAL FLUTTER, PAROXYSMAL (H): ICD-10-CM

## 2025-07-01 DIAGNOSIS — B37.2 YEAST INFECTION OF THE SKIN: ICD-10-CM

## 2025-07-01 DIAGNOSIS — E87.6 HYPOKALEMIA: ICD-10-CM

## 2025-07-01 LAB
ANION GAP SERPL CALCULATED.3IONS-SCNC: 13 MMOL/L (ref 7–15)
BUN SERPL-MCNC: 12.9 MG/DL (ref 8–23)
CALCIUM SERPL-MCNC: 9.2 MG/DL (ref 8.8–10.4)
CHLORIDE SERPL-SCNC: 108 MMOL/L (ref 98–107)
CREAT SERPL-MCNC: 0.66 MG/DL (ref 0.51–0.95)
EGFRCR SERPLBLD CKD-EPI 2021: >90 ML/MIN/1.73M2
GLUCOSE SERPL-MCNC: 103 MG/DL (ref 70–99)
HCO3 SERPL-SCNC: 21 MMOL/L (ref 22–29)
MAGNESIUM SERPL-MCNC: 1.6 MG/DL (ref 1.7–2.3)
POTASSIUM SERPL-SCNC: 3.4 MMOL/L (ref 3.4–5.3)
SODIUM SERPL-SCNC: 142 MMOL/L (ref 135–145)

## 2025-07-01 PROCEDURE — 3074F SYST BP LT 130 MM HG: CPT | Performed by: NURSE PRACTITIONER

## 2025-07-01 PROCEDURE — 3078F DIAST BP <80 MM HG: CPT | Performed by: NURSE PRACTITIONER

## 2025-07-01 PROCEDURE — 99214 OFFICE O/P EST MOD 30 MIN: CPT | Performed by: NURSE PRACTITIONER

## 2025-07-01 PROCEDURE — G2211 COMPLEX E/M VISIT ADD ON: HCPCS | Performed by: NURSE PRACTITIONER

## 2025-07-01 PROCEDURE — 93000 ELECTROCARDIOGRAM COMPLETE: CPT | Performed by: NURSE PRACTITIONER

## 2025-07-01 RX ORDER — POTASSIUM CHLORIDE 750 MG/1
20 TABLET, EXTENDED RELEASE ORAL DAILY
Qty: 180 TABLET | Refills: 1 | Status: SHIPPED | OUTPATIENT
Start: 2025-07-01

## 2025-07-01 RX ORDER — AMIODARONE HYDROCHLORIDE 200 MG/1
200 TABLET ORAL 2 TIMES DAILY
Qty: 60 TABLET | Refills: 2 | Status: SHIPPED | OUTPATIENT
Start: 2025-07-01

## 2025-07-01 RX ORDER — NYSTATIN 100000 [USP'U]/G
POWDER TOPICAL DAILY PRN
Qty: 1 G | Refills: 0 | Status: SHIPPED | OUTPATIENT
Start: 2025-07-01

## 2025-07-01 RX ORDER — ACETAMINOPHEN 500 MG
500 TABLET ORAL EVERY 6 HOURS
COMMUNITY
Start: 2024-07-26

## 2025-07-01 RX ORDER — AMOXICILLIN 250 MG
2 CAPSULE ORAL DAILY PRN
COMMUNITY
Start: 2024-10-30

## 2025-07-01 RX ORDER — ARIPIPRAZOLE 10 MG/1
10 TABLET ORAL DAILY
COMMUNITY
Start: 2024-04-26
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
## 2025-07-01 NOTE — PATIENT INSTRUCTIONS
"Call [facility] nurse for any questions or concerns Mon-Fri 8am-4pm  [phone]: Nurse number    [phone]: After Hours urgent Phone Number (choose option 2)      Today, we discussed the following:    Medication changes:   Start potassium chloride 20mEq once daily     Increase Torsemide back to 20mg daily (full tablet) for 4 days then reduce back to 10mg daily    Continue other current medications     Start weighing yourself every day and call the heart clinic if weight consistently > 286 or increased swelling.       Follow up:   Heart ultrasound in 2 weeks    See Dr. Jama in August to discuss long term rhythm plan          Please, remember:   1.  Weigh yourself daily. Call if your weight is up > than 2 pounds in one day, or 5 pounds in one week; if you feel more short of breath or have worsening swelling in your legs or abdomen.  Bring a log of weights to your clinic visits.     2.  Follow the American Heart association diet: Eat a low sodium diet (less than 2,000mg or 2g of salt per day). Try to avoid \"fast food\".  Read food labels to know how much salt is in one serving. There is a lot of hidden salt in cheese, bread, sauces and salad dressings.  Diet is the feliciano to loosing weight first - start with smaller portion sizes.  If you eat less salt, you will retain less fluid.     3.  Avoid alcohol and Avoid drug use, as this can worsen heart failure.        Go to AA meetings     4.  Avoid NSAIDs as able (For example, Ibuprofen / aleve / advil / naprosen / diclofenac).    5.   Activity:  Aim for routine walking daily or moderate physical activity of 30 minutes, 4 times a week to start.   Take rest breaks to help conserve your energy.   If your legs swell during the day, lay down and elevate feet on pillows for 10 minutes twice a day; consider wearing knee high compression stockings 20-30 mmHg daily         Joseline Reyes [facility]    "
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
## 2025-07-08 NOTE — TELEPHONE ENCOUNTER
Writer notes pt did present for OV as scheduled below. Will close this encounter. JAQUI Mccormick RN.

## 2025-07-16 ENCOUNTER — RESULTS FOLLOW-UP (OUTPATIENT)
Dept: CARDIOLOGY | Facility: CLINIC | Age: 60
End: 2025-07-16
Payer: COMMERCIAL

## 2025-07-16 NOTE — RESULT ENCOUNTER NOTE
[facility]      Joseline Reyes APRN CNP to [facility] Heart Core Nurse CP 7/16/25  6:24 AM Result Note  Would add magnesium oxide 400mg once daily supplement given she is on digoxin. Can repeat magnesium level in 2 weeks. Can we make sure she was able to get the potassium supplements from pharmacy as well? She said initially there was an insurance barrier.    7/16/25 [employer] message sent  as well as VM left about the above information. Awaiting to hear back on which pharmacy to send prescription to and to double check that she was able to get her potassium that was previously prescribed.       Susan ADKINSN, RN  9:53 AM   Nurse line M-F 8am-4pm   [phone]

## 2025-07-18 ENCOUNTER — HOSPITAL ENCOUNTER (OUTPATIENT)
Dept: CARDIOLOGY | Facility: CLINIC | Age: 60
Discharge: HOME | End: 2025-07-18
Attending: INTERNAL MEDICINE | Admitting: INTERNAL MEDICINE
Payer: COMMERCIAL

## 2025-07-18 DIAGNOSIS — I48.92 ATRIAL FLUTTER WITH RAPID VENTRICULAR RESPONSE (H): ICD-10-CM

## 2025-07-18 LAB — LVEF ECHO: NORMAL

## 2025-07-18 PROCEDURE — 93306 TTE W/DOPPLER COMPLETE: CPT | Mod: 26 | Performed by: INTERNAL MEDICINE

## 2025-07-18 PROCEDURE — 93306 TTE W/DOPPLER COMPLETE: CPT

## 2025-08-05 ENCOUNTER — OFFICE VISIT (OUTPATIENT)
Dept: CARDIOLOGY | Facility: CLINIC | Age: 60
End: 2025-08-05
Payer: COMMERCIAL

## 2025-08-05 VITALS
DIASTOLIC BLOOD PRESSURE: 66 MMHG | RESPIRATION RATE: 18 BRPM | HEART RATE: 85 BPM | BODY MASS INDEX: 43.4 KG/M2 | HEIGHT: 65 IN | SYSTOLIC BLOOD PRESSURE: 113 MMHG | WEIGHT: 260.5 LBS | OXYGEN SATURATION: 94 %

## 2025-08-05 DIAGNOSIS — I48.92 ATRIAL FLUTTER WITH RAPID VENTRICULAR RESPONSE (H): ICD-10-CM

## 2025-08-05 PROCEDURE — 3074F SYST BP LT 130 MM HG: CPT | Performed by: INTERNAL MEDICINE

## 2025-08-05 PROCEDURE — G2211 COMPLEX E/M VISIT ADD ON: HCPCS | Performed by: INTERNAL MEDICINE

## 2025-08-05 PROCEDURE — 3078F DIAST BP <80 MM HG: CPT | Performed by: INTERNAL MEDICINE

## 2025-08-05 PROCEDURE — 99215 OFFICE O/P EST HI 40 MIN: CPT | Performed by: INTERNAL MEDICINE
